# Patient Record
Sex: MALE | Race: WHITE | NOT HISPANIC OR LATINO | Employment: OTHER | URBAN - METROPOLITAN AREA
[De-identification: names, ages, dates, MRNs, and addresses within clinical notes are randomized per-mention and may not be internally consistent; named-entity substitution may affect disease eponyms.]

---

## 2023-06-26 ENCOUNTER — OFFICE VISIT (OUTPATIENT)
Dept: PRIMARY CARE | Facility: CLINIC | Age: 73
End: 2023-06-26
Payer: MEDICARE

## 2023-06-26 VITALS
DIASTOLIC BLOOD PRESSURE: 90 MMHG | HEIGHT: 75 IN | OXYGEN SATURATION: 94 % | BODY MASS INDEX: 39.17 KG/M2 | SYSTOLIC BLOOD PRESSURE: 177 MMHG | HEART RATE: 70 BPM | WEIGHT: 315 LBS

## 2023-06-26 DIAGNOSIS — N18.31 CHRONIC KIDNEY DISEASE, STAGE 3A (MULTI): ICD-10-CM

## 2023-06-26 DIAGNOSIS — Z00.00 ROUTINE GENERAL MEDICAL EXAMINATION AT HEALTH CARE FACILITY: Primary | ICD-10-CM

## 2023-06-26 DIAGNOSIS — I48.91 ATRIAL FIBRILLATION, UNSPECIFIED TYPE (MULTI): ICD-10-CM

## 2023-06-26 DIAGNOSIS — R35.1 NOCTURIA: ICD-10-CM

## 2023-06-26 DIAGNOSIS — I10 PRIMARY HYPERTENSION: ICD-10-CM

## 2023-06-26 DIAGNOSIS — E03.9 HYPOTHYROIDISM, UNSPECIFIED TYPE: ICD-10-CM

## 2023-06-26 DIAGNOSIS — E66.01 MORBID (SEVERE) OBESITY DUE TO EXCESS CALORIES (MULTI): ICD-10-CM

## 2023-06-26 DIAGNOSIS — E11.9 TYPE 2 DIABETES MELLITUS WITHOUT COMPLICATION, WITHOUT LONG-TERM CURRENT USE OF INSULIN (MULTI): ICD-10-CM

## 2023-06-26 PROCEDURE — 1159F MED LIST DOCD IN RCRD: CPT | Performed by: INTERNAL MEDICINE

## 2023-06-26 PROCEDURE — 4010F ACE/ARB THERAPY RXD/TAKEN: CPT | Performed by: INTERNAL MEDICINE

## 2023-06-26 PROCEDURE — 1157F ADVNC CARE PLAN IN RCRD: CPT | Performed by: INTERNAL MEDICINE

## 2023-06-26 PROCEDURE — 3080F DIAST BP >= 90 MM HG: CPT | Performed by: INTERNAL MEDICINE

## 2023-06-26 PROCEDURE — 1170F FXNL STATUS ASSESSED: CPT | Performed by: INTERNAL MEDICINE

## 2023-06-26 PROCEDURE — 1160F RVW MEDS BY RX/DR IN RCRD: CPT | Performed by: INTERNAL MEDICINE

## 2023-06-26 PROCEDURE — 1036F TOBACCO NON-USER: CPT | Performed by: INTERNAL MEDICINE

## 2023-06-26 PROCEDURE — 3077F SYST BP >= 140 MM HG: CPT | Performed by: INTERNAL MEDICINE

## 2023-06-26 PROCEDURE — G0439 PPPS, SUBSEQ VISIT: HCPCS | Performed by: INTERNAL MEDICINE

## 2023-06-26 PROCEDURE — 99214 OFFICE O/P EST MOD 30 MIN: CPT | Performed by: INTERNAL MEDICINE

## 2023-06-26 RX ORDER — AMOXICILLIN 500 MG/1
CAPSULE ORAL
COMMUNITY
Start: 2023-04-13 | End: 2023-11-30 | Stop reason: ALTCHOICE

## 2023-06-26 RX ORDER — CHOLECALCIFEROL (VITAMIN D3) 25 MCG
1000 TABLET ORAL
COMMUNITY
Start: 2015-09-14 | End: 2023-11-30 | Stop reason: ALTCHOICE

## 2023-06-26 RX ORDER — RIVAROXABAN 20 MG/1
20 TABLET, FILM COATED ORAL
COMMUNITY

## 2023-06-26 RX ORDER — HYDROCHLOROTHIAZIDE 12.5 MG/1
12.5 TABLET ORAL DAILY
Qty: 30 TABLET | Refills: 5 | Status: SHIPPED | OUTPATIENT
Start: 2023-06-26 | End: 2023-07-20

## 2023-06-26 RX ORDER — PNV NO.95/FERROUS FUM/FOLIC AC 28MG-0.8MG
1000 TABLET ORAL 3 TIMES WEEKLY
COMMUNITY
End: 2023-11-30 | Stop reason: ALTCHOICE

## 2023-06-26 RX ORDER — PHENAZOPYRIDINE HYDROCHLORIDE 200 MG/1
200 TABLET, FILM COATED ORAL
COMMUNITY
Start: 2022-12-09

## 2023-06-26 RX ORDER — MULTIVIT-MIN/IRON FUM/FOLIC AC 7.5 MG-4
TABLET ORAL
COMMUNITY
End: 2023-11-30 | Stop reason: ALTCHOICE

## 2023-06-26 RX ORDER — CLONIDINE HYDROCHLORIDE 0.1 MG/1
0.1 TABLET ORAL 3 TIMES DAILY
COMMUNITY
End: 2023-06-26 | Stop reason: ALTCHOICE

## 2023-06-26 RX ORDER — LISINOPRIL 40 MG/1
40 TABLET ORAL DAILY
COMMUNITY

## 2023-06-26 RX ORDER — CARVEDILOL 25 MG/1
25 TABLET ORAL
COMMUNITY

## 2023-06-26 ASSESSMENT — PATIENT HEALTH QUESTIONNAIRE - PHQ9
1. LITTLE INTEREST OR PLEASURE IN DOING THINGS: NOT AT ALL
2. FEELING DOWN, DEPRESSED OR HOPELESS: NOT AT ALL
SUM OF ALL RESPONSES TO PHQ9 QUESTIONS 1 AND 2: 0

## 2023-06-26 ASSESSMENT — ACTIVITIES OF DAILY LIVING (ADL)
BATHING: INDEPENDENT
GROCERY_SHOPPING: NEEDS ASSISTANCE
DRESSING: INDEPENDENT
MANAGING_FINANCES: INDEPENDENT
TAKING_MEDICATION: INDEPENDENT
DOING_HOUSEWORK: INDEPENDENT

## 2023-06-26 NOTE — PATIENT INSTRUCTIONS
Start hydrochlorothiazide in the am  Do not restart clonidine   Urology at [facility] [phone] or number on this form for [place]  Get fasting labs 7 days after starting hydrochlorothiazide     Come back in 3 months

## 2023-06-26 NOTE — PROGRESS NOTES
"Subjective   Reason for Visit: Aly Mccarty is an 73 y.o. male here for a Medicare Wellness visit.          Reviewed all medications by prescribing practitioner or clinical pharmacist (such as prescriptions, OTCs, herbal therapies and supplements) and documented in the medical record.    HPI    Patient Care Team:  Luis Eduardo Kay DO as PCP - General  Luis Eduardo Kay DO as PCP - [facility] PCP       S/p TAVR 11/2022. No CP, SOB or LE edema  Reports dysuria, urinary and frequency. Has a slow urinary stream, some hesitancy. Nocturia 5-6x nightly. Follows with urology, tamsulosin was not helpful    BP has been 150s/90s    Review of Systems   All other systems reviewed and are negative.      Objective   Vitals:  /90   Pulse 70   Ht 1.905 m (6' 3\")   Wt (!) 151 kg (332 lb)   SpO2 94%   BMI 41.50 kg/m²       Physical Exam  Constitutional:       Appearance: Normal appearance.   HENT:      Head: Normocephalic and atraumatic.   Cardiovascular:      Rate and Rhythm: Normal rate and regular rhythm.      Heart sounds: Normal heart sounds. No murmur heard.     No gallop.   Pulmonary:      Effort: Pulmonary effort is normal. No respiratory distress.      Breath sounds: No wheezing or rales.   Skin:     General: Skin is warm and dry.      Findings: No rash.   Neurological:      Mental Status: He is alert and oriented to person, place, and time. Mental status is at baseline.   Psychiatric:         Mood and Affect: Mood normal.         Behavior: Behavior normal.         Assessment/Plan      #Dizziness  -improved, seen by neuro and ENT--no etiology identified      #HTN, Chronic Afib, AVS s/p TAVR  -Follows with Dr. Alcantar   -HTN uncontrolled. Start hydrochlorothiazide 12.5mg daily  -Cont hydralazine 50mg TID, carvedilol 25mg BID  and  lisinopril 40mg daily   -Cont Xarelto      #Hypothyroidism  -Check TSH today, cont levothyroxine      #AMANDA  -Reports compliance with CPAP    #Chronic dysuria, nocturia   -Follow with Dr. Ramsay, " will get second opinion from  urologist        Colorectal cancer screening: 3-4 years ago  Prostate screenin18     RTC 6 mo

## 2023-07-12 ENCOUNTER — LAB (OUTPATIENT)
Dept: LAB | Facility: LAB | Age: 73
End: 2023-07-12
Payer: MEDICARE

## 2023-07-12 ENCOUNTER — APPOINTMENT (OUTPATIENT)
Dept: LAB | Facility: LAB | Age: 73
End: 2023-07-12
Payer: MEDICARE

## 2023-07-12 DIAGNOSIS — R35.1 NOCTURIA: ICD-10-CM

## 2023-07-12 DIAGNOSIS — E03.9 HYPOTHYROIDISM, UNSPECIFIED TYPE: ICD-10-CM

## 2023-07-12 DIAGNOSIS — E66.01 MORBID (SEVERE) OBESITY DUE TO EXCESS CALORIES (MULTI): ICD-10-CM

## 2023-07-12 DIAGNOSIS — I10 PRIMARY HYPERTENSION: ICD-10-CM

## 2023-07-12 LAB
ALANINE AMINOTRANSFERASE (SGPT) (U/L) IN SER/PLAS: 11 U/L (ref 10–52)
ALBUMIN (G/DL) IN SER/PLAS: 4.1 G/DL (ref 3.4–5)
ALKALINE PHOSPHATASE (U/L) IN SER/PLAS: 49 U/L (ref 33–136)
ANION GAP IN SER/PLAS: 12 MMOL/L (ref 10–20)
ASPARTATE AMINOTRANSFERASE (SGOT) (U/L) IN SER/PLAS: 17 U/L (ref 9–39)
BILIRUBIN TOTAL (MG/DL) IN SER/PLAS: 0.9 MG/DL (ref 0–1.2)
CALCIUM (MG/DL) IN SER/PLAS: 8.9 MG/DL (ref 8.6–10.3)
CARBON DIOXIDE, TOTAL (MMOL/L) IN SER/PLAS: 27 MMOL/L (ref 21–32)
CHLORIDE (MMOL/L) IN SER/PLAS: 97 MMOL/L (ref 98–107)
CHOLESTEROL (MG/DL) IN SER/PLAS: 181 MG/DL (ref 0–199)
CHOLESTEROL IN HDL (MG/DL) IN SER/PLAS: 47.4 MG/DL
CHOLESTEROL/HDL RATIO: 3.8
CREATININE (MG/DL) IN SER/PLAS: 1.83 MG/DL (ref 0.5–1.3)
ERYTHROCYTE DISTRIBUTION WIDTH (RATIO) BY AUTOMATED COUNT: 13.1 % (ref 11.5–14.5)
ERYTHROCYTE MEAN CORPUSCULAR HEMOGLOBIN CONCENTRATION (G/DL) BY AUTOMATED: 33.2 G/DL (ref 32–36)
ERYTHROCYTE MEAN CORPUSCULAR VOLUME (FL) BY AUTOMATED COUNT: 107 FL (ref 80–100)
ERYTHROCYTES (10*6/UL) IN BLOOD BY AUTOMATED COUNT: 3.72 X10E12/L (ref 4.5–5.9)
ESTIMATED AVERAGE GLUCOSE FOR HBA1C: 91 MG/DL
GFR MALE: 39 ML/MIN/1.73M2
GLUCOSE (MG/DL) IN SER/PLAS: 92 MG/DL (ref 74–99)
HEMATOCRIT (%) IN BLOOD BY AUTOMATED COUNT: 39.8 % (ref 41–52)
HEMOGLOBIN (G/DL) IN BLOOD: 13.2 G/DL (ref 13.5–17.5)
HEMOGLOBIN A1C/HEMOGLOBIN TOTAL IN BLOOD: 4.8 %
LDL: 110 MG/DL (ref 0–99)
LEUKOCYTES (10*3/UL) IN BLOOD BY AUTOMATED COUNT: 3.9 X10E9/L (ref 4.4–11.3)
PLATELETS (10*3/UL) IN BLOOD AUTOMATED COUNT: 91 X10E9/L (ref 150–450)
POTASSIUM (MMOL/L) IN SER/PLAS: 4.2 MMOL/L (ref 3.5–5.3)
PROSTATE SPECIFIC AG (NG/ML) IN SER/PLAS: 1.24 NG/ML (ref 0–4)
PROTEIN TOTAL: 6.7 G/DL (ref 6.4–8.2)
SODIUM (MMOL/L) IN SER/PLAS: 132 MMOL/L (ref 136–145)
THYROTROPIN (MIU/L) IN SER/PLAS BY DETECTION LIMIT <= 0.05 MIU/L: 1.01 MIU/L (ref 0.44–3.98)
TRIGLYCERIDE (MG/DL) IN SER/PLAS: 119 MG/DL (ref 0–149)
UREA NITROGEN (MG/DL) IN SER/PLAS: 28 MG/DL (ref 6–23)
VLDL: 24 MG/DL (ref 0–40)

## 2023-07-12 PROCEDURE — 83036 HEMOGLOBIN GLYCOSYLATED A1C: CPT

## 2023-07-12 PROCEDURE — 84443 ASSAY THYROID STIM HORMONE: CPT

## 2023-07-12 PROCEDURE — 36415 COLL VENOUS BLD VENIPUNCTURE: CPT

## 2023-07-12 PROCEDURE — 80053 COMPREHEN METABOLIC PANEL: CPT

## 2023-07-12 PROCEDURE — 80061 LIPID PANEL: CPT

## 2023-07-12 PROCEDURE — 85027 COMPLETE CBC AUTOMATED: CPT

## 2023-07-12 PROCEDURE — 84153 ASSAY OF PSA TOTAL: CPT

## 2023-07-17 ENCOUNTER — TELEPHONE (OUTPATIENT)
Dept: PRIMARY CARE | Facility: CLINIC | Age: 73
End: 2023-07-17
Payer: MEDICARE

## 2023-07-17 DIAGNOSIS — E87.1 HYPONATREMIA: Primary | ICD-10-CM

## 2023-07-17 DIAGNOSIS — D61.818 PANCYTOPENIA (MULTI): ICD-10-CM

## 2023-07-19 DIAGNOSIS — I10 PRIMARY HYPERTENSION: ICD-10-CM

## 2023-07-20 RX ORDER — HYDROCHLOROTHIAZIDE 12.5 MG/1
TABLET ORAL
Qty: 30 TABLET | Refills: 5 | Status: SHIPPED | OUTPATIENT
Start: 2023-07-20 | End: 2024-01-11

## 2023-09-27 PROBLEM — G47.36 SLEEP RELATED HYPOVENTILATION IN CONDITIONS CLASSIFIED ELSEWHERE: Status: ACTIVE | Noted: 2022-06-13

## 2023-09-27 PROBLEM — E53.8 VITAMIN B12 DEFICIENCY: Status: ACTIVE | Noted: 2023-09-27

## 2023-09-27 PROBLEM — H90.3 BILATERAL SENSORINEURAL HEARING LOSS: Status: ACTIVE | Noted: 2022-06-13

## 2023-09-27 PROBLEM — I85.00 ESOPHAGEAL VARICES (MULTI): Status: ACTIVE | Noted: 2017-07-03

## 2023-09-27 PROBLEM — E03.8 ADULT ONSET HYPOTHYROIDISM: Status: ACTIVE | Noted: 2023-09-27

## 2023-09-27 PROBLEM — R73.01 IMPAIRED FASTING GLUCOSE: Status: ACTIVE | Noted: 2023-09-27

## 2023-09-27 PROBLEM — E44.0 MALNUTRITION OF MODERATE DEGREE (MULTI): Status: ACTIVE | Noted: 2017-07-05

## 2023-09-27 PROBLEM — Z95.2 S/P TAVR (TRANSCATHETER AORTIC VALVE REPLACEMENT): Status: ACTIVE | Noted: 2023-09-27

## 2023-09-27 PROBLEM — L03.90 CELLULITIS: Status: ACTIVE | Noted: 2017-07-03

## 2023-09-27 PROBLEM — D61.818 PANCYTOPENIA (MULTI): Status: ACTIVE | Noted: 2023-09-27

## 2023-09-27 PROBLEM — G60.9 IDIOPATHIC PERIPHERAL NEUROPATHY: Status: ACTIVE | Noted: 2023-09-27

## 2023-09-27 PROBLEM — J45.901 ASTHMA WITH COPD WITH EXACERBATION (MULTI): Status: ACTIVE | Noted: 2017-07-03

## 2023-09-27 PROBLEM — N28.9 KIDNEY DISEASE: Status: ACTIVE | Noted: 2023-09-27

## 2023-09-27 PROBLEM — K76.89 HEPATIC CYST: Status: ACTIVE | Noted: 2023-09-27

## 2023-09-27 PROBLEM — I35.0 AORTIC VALVE STENOSIS: Status: ACTIVE | Noted: 2023-09-27

## 2023-09-27 PROBLEM — I51.3 ATRIAL THROMBUS: Status: ACTIVE | Noted: 2023-09-27

## 2023-09-27 PROBLEM — I48.20 ATRIAL FIBRILLATION, CHRONIC (MULTI): Status: ACTIVE | Noted: 2017-07-03

## 2023-09-27 PROBLEM — Z87.891 PERSONAL HISTORY OF NICOTINE DEPENDENCE: Status: ACTIVE | Noted: 2022-06-13

## 2023-09-27 PROBLEM — Z99.89 BILEVEL POSITIVE AIRWAY PRESSURE (BPAP) DEPENDENCE: Status: ACTIVE | Noted: 2023-09-27

## 2023-09-27 PROBLEM — M48.00 SPINAL STENOSIS: Status: ACTIVE | Noted: 2017-07-03

## 2023-09-27 PROBLEM — K63.8219 SMALL INTESTINAL BACTERIAL OVERGROWTH: Status: ACTIVE | Noted: 2023-09-27

## 2023-09-27 PROBLEM — E55.9 VITAMIN D DEFICIENCY: Status: ACTIVE | Noted: 2023-09-27

## 2023-09-27 PROBLEM — N52.9 ED (ERECTILE DYSFUNCTION): Status: ACTIVE | Noted: 2023-09-27

## 2023-09-27 PROBLEM — M54.50 LOW BACK PAIN: Status: ACTIVE | Noted: 2023-09-27

## 2023-09-27 PROBLEM — R53.1 WEAKNESS: Status: ACTIVE | Noted: 2017-07-03

## 2023-09-27 PROBLEM — G60.9 HEREDITARY AND IDIOPATHIC NEUROPATHY: Status: ACTIVE | Noted: 2022-06-13

## 2023-09-27 PROBLEM — I10 BENIGN ESSENTIAL HYPERTENSION: Status: ACTIVE | Noted: 2023-09-27

## 2023-09-27 PROBLEM — G47.33 OBSTRUCTIVE SLEEP APNEA: Status: ACTIVE | Noted: 2017-07-03

## 2023-09-27 PROBLEM — R26.89 DECREASED MOBILITY: Status: ACTIVE | Noted: 2023-09-27

## 2023-09-27 PROBLEM — K27.4 PEPTIC ULCER HEMORRHAGE: Status: ACTIVE | Noted: 2023-09-27

## 2023-09-27 PROBLEM — J44.1 ASTHMA WITH COPD WITH EXACERBATION (MULTI): Status: ACTIVE | Noted: 2017-07-03

## 2023-09-27 PROBLEM — R26.9 ABNORMAL GAIT: Status: ACTIVE | Noted: 2023-09-27

## 2023-09-27 PROBLEM — T16.9XXA FOREIGN BODY IN EAR: Status: ACTIVE | Noted: 2023-09-27

## 2023-09-27 PROBLEM — I35.0 NONRHEUMATIC AORTIC (VALVE) STENOSIS: Status: ACTIVE | Noted: 2022-06-13

## 2023-09-27 PROBLEM — R10.9 ABDOMINAL PAIN: Status: ACTIVE | Noted: 2017-08-28

## 2023-09-27 PROBLEM — N28.9 RENAL DYSFUNCTION: Status: ACTIVE | Noted: 2023-09-27

## 2023-09-27 PROBLEM — Z91.199 NONCOMPLIANCE WITH CPAP TREATMENT: Status: ACTIVE | Noted: 2023-09-27

## 2023-09-27 PROBLEM — H93.13 TINNITUS OF BOTH EARS: Status: ACTIVE | Noted: 2023-09-27

## 2023-09-27 PROBLEM — R42 DIZZINESS: Status: ACTIVE | Noted: 2023-09-27

## 2023-09-27 PROBLEM — M48.061 SPINAL STENOSIS, LUMBAR REGION WITHOUT NEUROGENIC CLAUDICATION: Status: ACTIVE | Noted: 2022-06-13

## 2023-09-27 PROBLEM — N17.9 ACUTE KIDNEY FAILURE (CMS-HCC): Status: ACTIVE | Noted: 2017-07-03

## 2023-09-27 PROBLEM — K21.00 GASTROESOPHAGEAL REFLUX DISEASE WITH ESOPHAGITIS: Status: ACTIVE | Noted: 2023-09-27

## 2023-09-27 PROBLEM — D64.9 ANEMIA: Status: ACTIVE | Noted: 2017-07-03

## 2023-09-27 PROBLEM — E78.2 COMBINED HYPERLIPIDEMIA: Status: ACTIVE | Noted: 2023-09-27

## 2023-09-27 RX ORDER — LEVOTHYROXINE SODIUM 75 UG/1
TABLET ORAL
COMMUNITY
End: 2023-11-30 | Stop reason: ALTCHOICE

## 2023-09-27 RX ORDER — FUROSEMIDE 40 MG/1
TABLET ORAL
COMMUNITY
End: 2023-11-30 | Stop reason: ALTCHOICE

## 2023-09-27 RX ORDER — NYSTATIN 100000 [USP'U]/G
POWDER TOPICAL
COMMUNITY
End: 2023-11-30 | Stop reason: ALTCHOICE

## 2023-09-27 RX ORDER — ASPIRIN 81 MG/1
1 TABLET ORAL DAILY
COMMUNITY
End: 2023-11-30 | Stop reason: ALTCHOICE

## 2023-09-27 RX ORDER — FERROUS SULFATE, DRIED 160(50) MG
TABLET, EXTENDED RELEASE ORAL
COMMUNITY
End: 2023-11-30 | Stop reason: ALTCHOICE

## 2023-09-27 RX ORDER — METOPROLOL SUCCINATE 25 MG/1
25 TABLET, EXTENDED RELEASE ORAL 2 TIMES DAILY
COMMUNITY
End: 2023-11-30 | Stop reason: ALTCHOICE

## 2023-09-27 RX ORDER — CLONIDINE HYDROCHLORIDE 0.1 MG/1
0.1 TABLET ORAL 2 TIMES DAILY
COMMUNITY
Start: 2022-06-23 | End: 2023-11-30 | Stop reason: ALTCHOICE

## 2023-09-27 RX ORDER — CHOLECALCIFEROL (VITAMIN D3) 125 MCG
CAPSULE ORAL
COMMUNITY
End: 2023-11-30 | Stop reason: ALTCHOICE

## 2023-09-27 RX ORDER — LIDOCAINE 560 MG/1
PATCH PERCUTANEOUS; TOPICAL; TRANSDERMAL
COMMUNITY
End: 2023-11-30 | Stop reason: ALTCHOICE

## 2023-09-27 RX ORDER — AMIODARONE HYDROCHLORIDE 200 MG/1
TABLET ORAL
COMMUNITY
End: 2023-11-30 | Stop reason: ALTCHOICE

## 2023-09-27 RX ORDER — GABAPENTIN 300 MG/1
300 CAPSULE ORAL EVERY 8 HOURS
COMMUNITY
Start: 2017-07-14 | End: 2023-11-30 | Stop reason: ALTCHOICE

## 2023-09-27 RX ORDER — LANOLIN ALCOHOL/MO/W.PET/CERES
2 CREAM (GRAM) TOPICAL DAILY
COMMUNITY
End: 2023-12-03 | Stop reason: SDUPTHER

## 2023-09-27 RX ORDER — ACYCLOVIR 800 MG/1
TABLET ORAL
COMMUNITY
End: 2023-11-30 | Stop reason: ALTCHOICE

## 2023-09-27 RX ORDER — LANOLIN ALCOHOL/MO/W.PET/CERES
1 CREAM (GRAM) TOPICAL DAILY
COMMUNITY
End: 2023-11-30 | Stop reason: ALTCHOICE

## 2023-09-27 RX ORDER — FUROSEMIDE 20 MG/1
20 TABLET ORAL DAILY
COMMUNITY
End: 2023-11-30 | Stop reason: ALTCHOICE

## 2023-09-27 RX ORDER — DABIGATRAN ETEXILATE 150 MG/1
CAPSULE ORAL
COMMUNITY
End: 2023-11-30 | Stop reason: ALTCHOICE

## 2023-09-27 RX ORDER — POTASSIUM CHLORIDE 20 MEQ/1
TABLET, EXTENDED RELEASE ORAL
COMMUNITY
Start: 2017-07-14 | End: 2023-11-30 | Stop reason: ALTCHOICE

## 2023-09-27 RX ORDER — ASPIRIN 325 MG
TABLET ORAL
COMMUNITY
End: 2023-11-30 | Stop reason: ALTCHOICE

## 2023-09-27 RX ORDER — LISINOPRIL 20 MG/1
TABLET ORAL
COMMUNITY
End: 2023-11-30 | Stop reason: ALTCHOICE

## 2023-09-27 RX ORDER — CALCIUM CARBONATE 500(1250)
1 TABLET ORAL 2 TIMES DAILY
COMMUNITY
End: 2023-11-30 | Stop reason: ALTCHOICE

## 2023-09-27 RX ORDER — DOCUSATE SODIUM 100 MG/1
CAPSULE, LIQUID FILLED ORAL
COMMUNITY
End: 2023-11-30 | Stop reason: ALTCHOICE

## 2023-09-27 RX ORDER — POLYVINYL ALCOHOL 14 MG/ML
1 SOLUTION/ DROPS OPHTHALMIC 3 TIMES DAILY
COMMUNITY
Start: 2017-07-14 | End: 2023-11-30 | Stop reason: ALTCHOICE

## 2023-09-27 RX ORDER — HYDRALAZINE HYDROCHLORIDE 25 MG/1
TABLET, FILM COATED ORAL
COMMUNITY
End: 2023-11-30 | Stop reason: ALTCHOICE

## 2023-09-27 RX ORDER — COLLAGENASE SANTYL 250 [ARB'U]/G
OINTMENT TOPICAL
COMMUNITY
End: 2023-11-30 | Stop reason: ALTCHOICE

## 2023-09-27 RX ORDER — OXYCODONE AND ACETAMINOPHEN 5; 325 MG/1; MG/1
TABLET ORAL
COMMUNITY
End: 2023-11-30 | Stop reason: ALTCHOICE

## 2023-09-27 RX ORDER — LEVOTHYROXINE SODIUM 100 UG/1
TABLET ORAL
COMMUNITY
End: 2023-11-30 | Stop reason: ALTCHOICE

## 2023-09-27 RX ORDER — NAPROXEN SODIUM 220 MG/1
TABLET, FILM COATED ORAL
COMMUNITY
End: 2023-11-30 | Stop reason: ALTCHOICE

## 2023-09-27 RX ORDER — SUCRALFATE 1 G/10ML
SUSPENSION ORAL
COMMUNITY
End: 2023-11-30 | Stop reason: ALTCHOICE

## 2023-09-27 RX ORDER — ALBUTEROL SULFATE 0.83 MG/ML
2.5 SOLUTION RESPIRATORY (INHALATION) EVERY 2 HOUR PRN
COMMUNITY
End: 2024-02-29

## 2023-09-27 RX ORDER — TIZANIDINE 2 MG/1
TABLET ORAL 3 TIMES DAILY
COMMUNITY
Start: 2017-07-14 | End: 2023-11-30 | Stop reason: ALTCHOICE

## 2023-09-27 RX ORDER — FENTANYL 25 UG/1
1 PATCH TRANSDERMAL
COMMUNITY
End: 2023-11-30 | Stop reason: ALTCHOICE

## 2023-09-27 RX ORDER — ATORVASTATIN CALCIUM 20 MG/1
TABLET, FILM COATED ORAL
COMMUNITY
End: 2023-11-30 | Stop reason: ALTCHOICE

## 2023-09-27 RX ORDER — TRAMADOL HYDROCHLORIDE 50 MG/1
TABLET ORAL
COMMUNITY
End: 2023-11-30 | Stop reason: ALTCHOICE

## 2023-09-27 RX ORDER — BUTYROSPERMUM PARKII(SHEA BUTTER), SIMMONDSIA CHINENSIS (JOJOBA) SEED OIL, ALOE BARBADENSIS LEAF EXTRACT .01; 1; 3.5 G/100G; G/100G; G/100G
LIQUID TOPICAL
COMMUNITY
End: 2023-11-30 | Stop reason: ALTCHOICE

## 2023-09-27 RX ORDER — DOXYCYCLINE HYCLATE 100 MG
TABLET ORAL
COMMUNITY
End: 2023-11-30 | Stop reason: ALTCHOICE

## 2023-11-09 ENCOUNTER — APPOINTMENT (OUTPATIENT)
Dept: HEMATOLOGY/ONCOLOGY | Facility: CLINIC | Age: 73
End: 2023-11-09
Payer: MEDICARE

## 2023-11-24 ENCOUNTER — TELEMEDICINE (OUTPATIENT)
Dept: CARDIOLOGY | Facility: HOSPITAL | Age: 73
End: 2023-11-24
Payer: MEDICARE

## 2023-11-24 DIAGNOSIS — Z95.2 S/P TAVR (TRANSCATHETER AORTIC VALVE REPLACEMENT): Primary | ICD-10-CM

## 2023-11-24 PROCEDURE — 99212 OFFICE O/P EST SF 10 MIN: CPT

## 2023-11-24 ASSESSMENT — ENCOUNTER SYMPTOMS: BACK PAIN: 1

## 2023-11-24 NOTE — PROGRESS NOTES
Subjective   Aly Mccarty is a 73 y.o. male.    HPI  One year s/p TAVR with [facility].    Review of Systems   Musculoskeletal:  Positive for back pain.   All other systems reviewed and are negative.         KCCQ Questionnaire      1  Heart failure affects different people in different ways. Some feel shortness of breath while others feel fatigue. Please indicate how much you are limited by heart failure (shortness of breath or fatigue) in your ability to do the following activities over the past 2 weeks. 1 month [facility] NP follow-up     A.) Showering/bathing  5. Not at All  B.) Walking 1 block on level ground 6. Limited for other reastons  C.) Hurrying or Jogging   6. Limited for other reastons    2.  Over the past 2 weeks, how many times did you have swelling in your feet, ankles or legs when you woke up in the morning? 5. Never    3.  Over the past 2 weeks, on average, how many times has fatigue limited your ability to do what you wanted? 7. Never    4.  Over the past 2 weeks, on average, how many times has shortness of breath limited your ability to do what you wanted? 7. Never    5.  Over the past 2 weeks, on average, how many times have you been forced to sleep sitting up in a chair or with at least 3 pillows to prop you up because of shortness of breath? Never    6. Over the past 2 weeks, how much has your heart failure limited your enjoyment of life? It has not limited my enjoyment of life    7. If you had to spend the rest of your life with your heart failure the way it is right now, how would you feel about this? 5. Completely satisfied    8. How much does your heart failure affect your lifestyle? Please indicate how your heart failure may have limited yourparticipation in the following activities over the past 2 weeks    A.)  Hobbies, recreational activities  6. Does not apply for other reasons    B.) Working or doing household chores  6. Does not apply for other reasons    C.)  Visiting family or friends out of your home  6. Does not apply for other reasons           Assessment/Plan     NYHA class I    Assessment/Impression: patient doing well clinically and denies HF signs and symptoms. VS: reported stable.     Plan:  - Cont current medication regimen  - Cont to increase activity as tolerated  - no further follow-ups with [facility], cont to follow with [facility] Dr. Alcantar  - echo done in August, will obtain from Dr. Alcantar's office  - Life-long Dental SBE prophylaxis for life  - KCCQ complete     Telehealth visit via [employer].      Lety Owens, APRN-CNP, CCRN  [facility] Team  Office Phone: [phone]  Fax: [phone]

## 2023-11-30 ENCOUNTER — OFFICE VISIT (OUTPATIENT)
Dept: HEMATOLOGY/ONCOLOGY | Facility: CLINIC | Age: 73
End: 2023-11-30
Payer: MEDICARE

## 2023-11-30 ENCOUNTER — OFFICE VISIT (OUTPATIENT)
Dept: GASTROENTEROLOGY | Facility: CLINIC | Age: 73
End: 2023-11-30
Payer: MEDICARE

## 2023-11-30 VITALS
HEART RATE: 52 BPM | RESPIRATION RATE: 18 BRPM | BODY MASS INDEX: 42.84 KG/M2 | WEIGHT: 315 LBS | SYSTOLIC BLOOD PRESSURE: 167 MMHG | TEMPERATURE: 97.7 F | OXYGEN SATURATION: 94 % | DIASTOLIC BLOOD PRESSURE: 77 MMHG

## 2023-11-30 VITALS
DIASTOLIC BLOOD PRESSURE: 82 MMHG | WEIGHT: 315 LBS | HEIGHT: 75 IN | HEART RATE: 52 BPM | SYSTOLIC BLOOD PRESSURE: 138 MMHG | OXYGEN SATURATION: 95 % | BODY MASS INDEX: 39.17 KG/M2

## 2023-11-30 DIAGNOSIS — N28.9 KIDNEY DISEASE: ICD-10-CM

## 2023-11-30 DIAGNOSIS — D61.818 PANCYTOPENIA (MULTI): Primary | ICD-10-CM

## 2023-11-30 DIAGNOSIS — K76.89 HEPATIC CYST: Primary | ICD-10-CM

## 2023-11-30 DIAGNOSIS — D61.818 PANCYTOPENIA (MULTI): ICD-10-CM

## 2023-11-30 LAB
ALBUMIN SERPL BCP-MCNC: 4 G/DL (ref 3.4–5)
ALP SERPL-CCNC: 63 U/L (ref 33–136)
ALT SERPL W P-5'-P-CCNC: 9 U/L (ref 10–52)
ANION GAP SERPL CALC-SCNC: 16 MMOL/L (ref 10–20)
AST SERPL W P-5'-P-CCNC: 16 U/L (ref 9–39)
B2 MICROGLOB SERPL-MCNC: 5.3 MG/L (ref 0.7–2.2)
BASOPHILS # BLD AUTO: 0.03 X10*3/UL (ref 0–0.1)
BASOPHILS NFR BLD AUTO: 0.6 %
BILIRUB SERPL-MCNC: 0.6 MG/DL (ref 0–1.2)
BUN SERPL-MCNC: 36 MG/DL (ref 6–23)
CALCIUM SERPL-MCNC: 8.9 MG/DL (ref 8.6–10.3)
CHLORIDE SERPL-SCNC: 101 MMOL/L (ref 98–107)
CO2 SERPL-SCNC: 22 MMOL/L (ref 21–32)
CREAT SERPL-MCNC: 2.24 MG/DL (ref 0.5–1.3)
EOSINOPHIL # BLD AUTO: 0.23 X10*3/UL (ref 0–0.4)
EOSINOPHIL NFR BLD AUTO: 4.8 %
ERYTHROCYTE [DISTWIDTH] IN BLOOD BY AUTOMATED COUNT: 12.8 % (ref 11.5–14.5)
FERRITIN SERPL-MCNC: 104 NG/ML (ref 20–300)
FOLATE SERPL-MCNC: >24 NG/ML
GFR SERPL CREATININE-BSD FRML MDRD: 30 ML/MIN/1.73M*2
GLUCOSE SERPL-MCNC: 108 MG/DL (ref 74–99)
HCT VFR BLD AUTO: 38.9 % (ref 41–52)
HGB BLD-MCNC: 11.9 G/DL (ref 13.5–17.5)
HGB RETIC QN: 38 PG (ref 28–38)
IMM GRANULOCYTES # BLD AUTO: 0.01 X10*3/UL (ref 0–0.5)
IMM GRANULOCYTES NFR BLD AUTO: 0.2 % (ref 0–0.9)
IMMATURE RETIC FRACTION: 20.9 %
IRON SATN MFR SERPL: 39 % (ref 25–45)
IRON SERPL-MCNC: 137 UG/DL (ref 35–150)
LDH SERPL L TO P-CCNC: 214 U/L (ref 84–246)
LYMPHOCYTES # BLD AUTO: 0.86 X10*3/UL (ref 0.8–3)
LYMPHOCYTES NFR BLD AUTO: 18 %
MCH RBC QN AUTO: 36.4 PG (ref 26–34)
MCHC RBC AUTO-ENTMCNC: 30.6 G/DL (ref 32–36)
MCV RBC AUTO: 119 FL (ref 80–100)
MONOCYTES # BLD AUTO: 0.32 X10*3/UL (ref 0.05–0.8)
MONOCYTES NFR BLD AUTO: 6.7 %
NEUTROPHILS # BLD AUTO: 3.32 X10*3/UL (ref 1.6–5.5)
NEUTROPHILS NFR BLD AUTO: 69.7 %
PATH REVIEW-CBC DIFFERENTIAL: NORMAL
PLATELET # BLD AUTO: 79 X10*3/UL (ref 150–450)
POTASSIUM SERPL-SCNC: 5.1 MMOL/L (ref 3.5–5.3)
PROT SERPL-MCNC: 6.9 G/DL (ref 6.4–8.2)
PROT SERPL-MCNC: 7.2 G/DL (ref 6.4–8.2)
RBC # BLD AUTO: 3.27 X10*6/UL (ref 4.5–5.9)
RETICS #: 0.08 X10*6/UL (ref 0.02–0.11)
RETICS/RBC NFR AUTO: 2.4 % (ref 0.5–2)
SODIUM SERPL-SCNC: 134 MMOL/L (ref 136–145)
TIBC SERPL-MCNC: 347 UG/DL (ref 240–445)
UIBC SERPL-MCNC: 210 UG/DL (ref 110–370)
VIT B12 SERPL-MCNC: 1671 PG/ML (ref 211–911)
WBC # BLD AUTO: 4.8 X10*3/UL (ref 4.4–11.3)

## 2023-11-30 PROCEDURE — 1159F MED LIST DOCD IN RCRD: CPT | Performed by: INTERNAL MEDICINE

## 2023-11-30 PROCEDURE — 99204 OFFICE O/P NEW MOD 45 MIN: CPT | Performed by: INTERNAL MEDICINE

## 2023-11-30 PROCEDURE — 1036F TOBACCO NON-USER: CPT

## 2023-11-30 PROCEDURE — 3075F SYST BP GE 130 - 139MM HG: CPT | Performed by: INTERNAL MEDICINE

## 2023-11-30 PROCEDURE — 83020 HEMOGLOBIN ELECTROPHORESIS: CPT | Performed by: PATHOLOGY

## 2023-11-30 PROCEDURE — 1159F MED LIST DOCD IN RCRD: CPT

## 2023-11-30 PROCEDURE — 99215 OFFICE O/P EST HI 40 MIN: CPT | Mod: PO,25

## 2023-11-30 PROCEDURE — 1160F RVW MEDS BY RX/DR IN RCRD: CPT

## 2023-11-30 PROCEDURE — 4010F ACE/ARB THERAPY RXD/TAKEN: CPT

## 2023-11-30 PROCEDURE — 4010F ACE/ARB THERAPY RXD/TAKEN: CPT | Performed by: INTERNAL MEDICINE

## 2023-11-30 PROCEDURE — 36415 COLL VENOUS BLD VENIPUNCTURE: CPT

## 2023-11-30 PROCEDURE — 83021 HEMOGLOBIN CHROMOTOGRAPHY: CPT

## 2023-11-30 PROCEDURE — 99215 OFFICE O/P EST HI 40 MIN: CPT

## 2023-11-30 PROCEDURE — 1126F AMNT PAIN NOTED NONE PRSNT: CPT | Performed by: INTERNAL MEDICINE

## 2023-11-30 PROCEDURE — 1126F AMNT PAIN NOTED NONE PRSNT: CPT

## 2023-11-30 PROCEDURE — 3078F DIAST BP <80 MM HG: CPT

## 2023-11-30 PROCEDURE — 1160F RVW MEDS BY RX/DR IN RCRD: CPT | Performed by: INTERNAL MEDICINE

## 2023-11-30 PROCEDURE — 3077F SYST BP >= 140 MM HG: CPT

## 2023-11-30 PROCEDURE — 3044F HG A1C LEVEL LT 7.0%: CPT | Performed by: INTERNAL MEDICINE

## 2023-11-30 PROCEDURE — 3044F HG A1C LEVEL LT 7.0%: CPT

## 2023-11-30 PROCEDURE — 3079F DIAST BP 80-89 MM HG: CPT | Performed by: INTERNAL MEDICINE

## 2023-11-30 PROCEDURE — 1036F TOBACCO NON-USER: CPT | Performed by: INTERNAL MEDICINE

## 2023-11-30 ASSESSMENT — PAIN SCALES - GENERAL
PAINLEVEL: 0-NO PAIN
PAINLEVEL: 0-NO PAIN

## 2023-11-30 NOTE — PROGRESS NOTES
"Patient Visit Information:   Visit Type: Follow Up Visit      History of Present Illness:      ID Statement:    PAMELA CANCINO is a 73 year old Male        Chief Complaint: Follow up for pancytopenia   Interval History:    Patient is a 73-year-old male that presents with his wife, Sole, for follow up of pancytopenia.  Patient presents in a wheelchair.  Outside lab records indicate pancytopenia ongoing since 2014.  Wife states he has been hospitalized in 2017 for over 5 months for osteomyelitis and septicemia.  He has chronic urinary tract infections. Last imaging CT chest abdomen and pelvis in 2022 reveal CAD, atherosclerosis,  calcified aortic valve, pulmonary hypertension, dilated pulmonary artery and hernia.  Head CT in 2021 revealed maxillary sinus thickening but no acute issues.  Vitals today are within normal range, systolic reading 166, previously elevated at prior visit.  Patient states he has had ongoing issues of brain fog for many years.  It was also noted in 2017 that he had internal bleeding and a colonoscopy and EGD was performed in which the patient states has resolved. Platelet and blood transfusions administered.  He is taking Pyridium daily for 3 months for urinary discomfort, he states PCP approval.   He denies any headache, lymphadenopathy, fever, night sweats, shortness of breath, chest pain or palpitations, abdominal pain, constipation, diarrhea, nausea, vomiting. Urinary issues are chronic and include dysuria, frequency and urgency.  Nocturia  6 times a night. \"I am use to it.\" He has sleep apnea and is on a CPAP.  Patient denies any issues with appetite, or weight loss.  Continues daily alcohol intake of 4 double bourbon. He sees a new urologist in October 2023 Dr. Carmichael.  10/02/2023, he left the office before being seen due to long office wait time.    Imaging Studies:  US abdomen on 08/31/2023 shows calculi vs sludge in gallbladder, hepatomegaly with similar appearance of hepatic " cyst in left lobe of liver. Previous CT abdomen 2014 shows fatty liver, cyst in left lobe, portal HTN, likely cirrhosis of the liver. Borderline splenomegaly. Repeat CT abdomen 2018 shows hepatic cysts in left lobe, suggest benign etiology. Renal US 2017 and 2020, both unremarkable. On 2019 Ct Pelvis shows umbilical hernia and no prostate abscess.     Medical history:  -Hypertension  -Aortic valve stenosis  -Chronic UTI  -A-fib on Xarelto  -Hearing loss sensorineural  -Dizziness and vertigo  -Erectile dysfunction  -GERD  -Idiopathic neuropathy  -Back pain  -Obesity  -Peptic ulcer hemorrhage  -Vitamin D3 def  -Vitamin B12  -Tinnitus      Social History:  - lives with wife Sole and two children (boy and girl)   -retired  -Daily alcohol intake   -Denies smoking or suing tobacco   -Occasional Marijuana use  -No illicit drug uses      Family History:  -mom  52 years breast cancer  -dad  84 years vascular disease   -Niece leukemia  as a teenager  -Sister with hypertension  -Daughter immune disease  -Brother myocardial infarction      Review of Systems:   System Review-All other systems including Neurologic, Cardiac, Gastrointestinal, Endocrine, Dermatologic, ENT, Respiratory, Infectious, Urologic, Musculoskeletal  have been reviewed and are negative for complaint outside of events noted below and within the assessment.            Allergies and Intolerances:       Allergies   Allergen Reactions    Levofloxacin Angioedema and Hives      Outpatient Medication Profile:  Current Outpatient Medications   Medication Instructions    albuterol 2.5 mg, nebulization, Every 2 hour PRN    B-12 Plus 5,000-100 mcg tablet, sublingual TAKE 1 TABLET SUBLINGUALLY EVERY DAY    carvedilol (COREG) 25 mg, oral, 2 times daily with meals, Take with food.    cyanocobalamin (Vitamin B-12) 1,000 mcg tablet 2 tablets, oral, Daily    folic acid (FOLVITE) 1 mg, oral, Daily    hydrALAZINE  (Apresoline) 50 mg tablet TAKE 1 TABLET BY MOUTH THREE TIMES A DAY    hydroCHLOROthiazide (HYDRODiuril) 12.5 mg tablet TAKE 1 TABLET BY MOUTH ONCE DAILY.    levothyroxine (Synthroid, Levoxyl) 88 mcg tablet TAKE 1 TABLET BY MOUTH EVERY DAY ON EMPTY STOMACH    lisinopril 40 mg, oral, Daily    phenazopyridine (PYRIDIUM) 200 mg, oral, 3 times daily with meals    thiamine (VITAMIN B-1) 100 mg, oral, Daily    Xarelto 20 mg, oral, Daily with evening meal      Past Medical History:   Diagnosis Date    Personal history of diseases of the blood and blood-forming organs and certain disorders involving the immune mechanism     History of hemorrhagic diathesis    Personal history of other diseases of the circulatory system     History of hypertension    Personal history of other diseases of the circulatory system 12/22/2022    History of aortic valve stenosis    Personal history of other diseases of the circulatory system 10/10/2021    History of chronic atrial fibrillation    Personal history of other diseases of the musculoskeletal system and connective tissue     History of spinal stenosis    Personal history of other diseases of the musculoskeletal system and connective tissue     History of arthritis    Personal history of other diseases of the nervous system and sense organs     History of sleep apnea      Performance:   ECOG Performance Status: 2- Ambulatory 50% of waking  hours          Vitals and Measurements:   Visit Vitals  /77 (BP Location: Right arm, Patient Position: Sitting, BP Cuff Size: Large adult long)   Pulse 52   Temp 36.5 °C (97.7 °F) (Temporal)   Resp 18      Vitals:    11/30/23 1034   Weight: (!) 155 kg (342 lb 11.3 oz)   Previous vitals and weight  Vitals: Temp: 36.2  HR: 49  RR: 18  BP: 155/78  SPO2%:   94   Measurements: HT(cm): 190.5  WT(kg): 149.4  BSA:  2.81  BMI:  41.1   Last 3 Weights & Heights: Date:                           Weight/Scale Type:                    Height:   24-Aug-2023 13:07                 149.4  kg                     190.5  cm         Physical Exam:      Constitutional: Well developed, awake/alert/oriented  x3, no distress, alert and cooperative   Eyes: PERRL, EOMI, clear sclera   ENMT: mucous membranes moist, no apparent injury,  no lesions seen   Head/Neck: Neck supple, no apparent injury, thyroid  without mass or tenderness, No JVD, trachea midline, no bruits   Respiratory/Thorax: Patent airways, CTAB, diminished bases bilateral, normal  breath sounds with good chest expansion, thorax symmetric   Cardiovascular: arrhythmia, no murmurs, swelling in bilateral legs plus one   Gastrointestinal: Obese, soft, non-tender, no rebound tenderness or guarding, unable to palpate liver or spleen   Musculoskeletal: ROM intact, no joint swelling, normal  strength   Extremities: Swelling right ankle, brace, no cyanosis, no contusions or wounds, no clubbing, mild swelling ankles.   Neurological: alert and oriented x3, intact senses,  motor, response and reflexes, normal strength for baseline   Lymphatic: No significant cervical or subclavicular  lymphadenopathy   Psychological: Appropriate mood and behavior. Assessment of alcohol cessation, patient does not want to quit drinking at this time   Skin: Warm and dry, pink cheeks, no lesions, no rashes         Lab Results:  Labs: Review by me  Lab Results   Component Value Date    WBC 4.8 11/30/2023    NEUTROABS 3.32 11/30/2023    IGABSOL 0.01 11/30/2023    LYMPHSABS 0.86 11/30/2023    MONOSABS 0.32 11/30/2023    EOSABS 0.23 11/30/2023    BASOSABS 0.03 11/30/2023    RBC 3.27 (L) 11/30/2023     (H) 11/30/2023    MCHC 30.6 (L) 11/30/2023    HGB 11.9 (L) 11/30/2023    HCT 38.9 (L) 11/30/2023    PLT 79 (L) 11/30/2023     Lab Results   Component Value Date    RETICCTPCT 2.4 (H) 11/30/2023      Lab Results   Component Value Date    CREATININE 2.24 (H) 11/30/2023    BUN 36 (H) 11/30/2023    EGFR 30 (L) 11/30/2023     (L) 11/30/2023    K 5.1  11/30/2023     11/30/2023    CO2 22 11/30/2023      Lab Results   Component Value Date    ALT 9 (L) 11/30/2023    AST 16 11/30/2023    ALKPHOS 63 11/30/2023    BILITOT 0.6 11/30/2023     Lab Results   Component Value Date    IRON 137 11/30/2023    TIBC 347 11/30/2023    FERRITIN 104 11/30/2023      Lab Results   Component Value Date    SNMUJWKA59 1,671 (H) 11/30/2023      Lab Results   Component Value Date    FOLATE >24.0 11/30/2023     Lab Results   Component Value Date    SEDRATE 6 08/24/2023      Lab Results   Component Value Date    CRP CANCELED 08/24/2023      Lab Results   Component Value Date     11/30/2023     Lab Results   Component Value Date    HAPTOGLOBIN 124 11/30/2023     Previous Labs 08/2023:  CBC date/time       WBC     HGB     HCT     PLT     Neut      24-Aug-2023 13:15   3.9(L)  12.3(L) 37.7(L) 90(L)   2.64     BMP date/time       NA              K               CL              CO2             BUN             CREAT             24-Aug-2023 13:15   128(L)          4.2             96(L)           N/A             36(H)            1.89(H)     Hepatic date/time   T Pro   T Bili  AST     ALT     ALKP    ALB       06-Jun-2017 13:50   5.8(L)  0.4     123(H)   29      154(H)  2.0(L)     LDH date/time       LDH     24-Aug-2023 13:15   N/A    Assessment and Plan:   Pleasant 73-year-old male presents for pancytopenia follow-up.  Discussed likely contributors below.  Also discussed daily alcohol use and chronic urinary tract infections as playing a role in his pancytopenia.  Pathologist review of CBCd reveal macrocytic anemia. Supplements of vital vitamin nutrients advised and prescribed. Will assess copper if leukopenia persists.  Discussed the nutritional deficiency, liver disease and alcohol intake as likely causation. His MCV is 119, more deficient from previous value of 107 over the last year. WBC 4.8, Hgb 11.9. RBC 3.27, platelets are 79 and previously 90. His daily alcohol consumption is of  concern for being the main cause of thrombocytopenia and additional pancytopenia. He states understanding of this assessment. We discussed alcohol cessation and he is not interested in stopping his drinking at this time. His wife inquired about how to approach cessation. I did advise not to cold turkey alcohol for risk of seizures and other withdrawal symptoms including nausea, vomiting, chills, headache, tremors, anxiety, hallucinations, sweating, racing heart, and altered mental. Suggestion [facility] or [facility], counseling services, for cessation guidance. Advised to also follow with PCP for additional assistance. Mr. Mccarty wife is also likely suffering from his alcohol consumption habits.  She endorses concern if he were to stop cold turkey as well, as she identifies a likely addiction.     Resources:  [facility] Hotline: 1-736.862.7283   Alcohol Anonymous [facility] www.[facility].org or 1-459.154.9011     Additional Observations, history, and referrals:  He is wheel chair bound other than pivot and transfer independently. His physical activity is limited by physical pain back and joints. Obesity noted. He has followed inpatient cardiology 09/2021 and 11/2022 for severe aortic stenosis and a-flutter. Follow cardiology yearly for ECHO and assessment. He is on Xarelto and ASA. Followed GI 08/31/2017 for colonoscopy for LLQ pain. Redundant colon noted. No specimens taken. Repeat five years recommended.   Referral to hepatology for evaluation of liver disease following US and previous CT results.   I have reviewed the patient's MHR of all notes, labs, imaging, procedures to assist in evaluation of health. Some of his health records were obtained outside resources [facility].    Follow-up:  RTC:  -- 3 months with labs (02/2024)    Medications:  -Continue supplements as prescribed-new B12, thiamine, folic acid sent to pharmacy    Imaging:  -no imaging ordered today     Referral/Consult:  -11/30/2023 Hepatology   -01/02/2024  urology  -01/11/2024 nephrology   -Cardiology as scheduled   -GI as scheduled  -PCP as scheduled/needed    Discussed the results of laboratory work-up in detail.  Patient states understanding and agreeable to plan of care.  Patient will call with any questions or concerns.          Patient Instructions:      Instructions Type: Healthy lifestyle and nutrition and activity, alcohol cessation.     Thank you allowing me to care for you today.    Sincerely,  Vickie Srivastava, APRN-CNP

## 2023-11-30 NOTE — PATIENT INSTRUCTIONS
Call us in a few months to discuss the plans from the hematology team and whether they feel that a liver biopsy is essential.    Try to avoid absolutely all alcohol use.    Work on diet, weight loss and exercise.

## 2023-11-30 NOTE — PROGRESS NOTES
HEPATOLOGY NEW PATIENT VISIT    November 30, 2023    Dr. Luis Eduardo Kay       Patient Name:   ALY CANCINO  Medical Record Number: [id number]  YOB: 1950    Dear Dr. Kay,    I had the pleasure of seeing Aly Cancino (along with his wife) for consultation in the [facility] ([facility] office). History and physical examination was performed. Pertinent available laboratory, imaging, pathology results were reviewed.     History of Present Illness:   The patient is a 73 year old white male who is referred for evaluation of a stable liver cyst and apparently a concern for advanced liver disease from the hematology team.    The patient has been undergoing extensive work-up for pancytopenia and recurrent infections.  As part of that, he had imaging studies that showed a liver cyst and he was referred to me for further evaluation.    In fact, he has been noted to have the cyst since at least 2014 and there has been no change in the cyst size or configuration.  However, he was referred to me for further evaluation.    It sounds as if he was referred to me because the hematology APN who has been following him for macrocytosis and pancytopenia was very concerned that he could have cirrhosis.  In fact, he drinks 3-4 alcoholic beverages per day and has for many years.  There have been times where he was too sick and was in the nursing home or rehab.  During those times, his platelet counts actually improved.  He has received blood transfusions and platelet transfusions in the past.    Other than the liver cyst, they have never been told of any liver disease in the past.    The patient denied any past history of acute hepatitis or jaundice.      He has never had any manifestations of liver disease including no jaundice, ascites, hepatic encephalopathy, or variceal bleeding. He denied ever having a liver biopsy.    He presented today for evaluation.  He denied any specific  liver-related complaints.  He does have issues with pain and difficulty ambulating because of a nonfused right foot.  He is in a wheelchair.    Past Medical/Surgical History:   Atrial fibrillation, unspecified type (CMS/HCC)  Morbid (severe) obesity due to excess calories (CMS/HCC)  Type 2 diabetes mellitus without complication, without long-term current use of insulin (CMS/HCC)  Chronic kidney disease, stage 3a (CMS/HCC)  Abdominal pain  Abnormal gait  Acute kidney failure (CMS/HCC)  Adult onset hypothyroidism  Anemia  Aortic valve stenosis  Asthma with COPD with exacerbation (CMS/HCC)  Benign essential hypertension  Bilateral sensorineural hearing loss  Bilevel positive airway pressure (BPAP) dependence  Cellulitis  Combined hyperlipidemia  Decreased mobility  Diabetes mellitus (CMS/HCC)  Dizziness  ED (erectile dysfunction)  Esophageal varices (CMS/HCC)  Foreign body in ear  Gastroesophageal reflux disease with esophagitis  Hepatic cyst  Idiopathic peripheral neuropathy  Impaired fasting glucose  Kidney disease  Low back pain  Malnutrition of moderate degree (CMS/HCC)  Obstructive sleep apnea  Open wound of knee, leg (except thigh), and ankle, complicated  Peptic ulcer hemorrhage  Renal dysfunction  Small intestinal bacterial overgrowth  Spinal stenosis  Tinnitus of both ears  Type III open fracture of ankle  Vitamin B12 deficiency  Vitamin D deficiency  Weakness  Atrial fibrillation, chronic (CMS/HCC)  Hereditary and idiopathic neuropathy  Nonrheumatic aortic (valve) stenosis  Pancytopenia (CMS/HCC)  Personal history of nicotine dependence  Sleep related hypoventilation in conditions classified elsewhere  Spinal stenosis, lumbar region without neurogenic claudication  Atrial thrombus  Noncompliance with CPAP treatment  S/P TAVR (transcatheter aortic valve replacement)    Family History:   There is no known family history of liver disease.  There is no known family history of colon cancer.  His niece had  leukemia.    Social History:   He denied tobacco use.  He drinks about 3-4 hard drinks daily.  He has had blood transfusions.  He denied tattoos.  He denied intravenous drug use.    Review of systems: As noted above.  Does have muscle pain and joint stiffness.  He does report some mental fogginess that only improves with alcohol use.  He does have ED.  He does have nocturia.  No fever, chills, weight loss, visual changes, auditory changes, shortness of breath, chest pain, abdominal pain, GI bleeding, diarrhea, constipation, depression, dysuria, hematuria, or rash.       Medical, Surgical, Family, and Social Histories  Past Medical History:   Diagnosis Date    Personal history of diseases of the blood and blood-forming organs and certain disorders involving the immune mechanism     History of hemorrhagic diathesis    Personal history of other diseases of the circulatory system     History of hypertension    Personal history of other diseases of the circulatory system 12/22/2022    History of aortic valve stenosis    Personal history of other diseases of the circulatory system 10/10/2021    History of chronic atrial fibrillation    Personal history of other diseases of the musculoskeletal system and connective tissue     History of spinal stenosis    Personal history of other diseases of the musculoskeletal system and connective tissue     History of arthritis    Personal history of other diseases of the nervous system and sense organs     History of sleep apnea       Past Surgical History:   Procedure Laterality Date    OTHER SURGICAL HISTORY  08/08/2019    PICC line insertion    OTHER SURGICAL HISTORY  08/08/2019    Leg surgery    OTHER SURGICAL HISTORY  07/07/2021    Tonsillectomy with adenoidectomy       Family History   Problem Relation Name Age of Onset    Hypertension Mother      Breast cancer Mother      Kidney disease Father          CKD    Peripheral vascular disease Father      Cancer Sibling         Social  History     Socioeconomic History    Marital status:      Spouse name: Not on file    Number of children: Not on file    Years of education: Not on file    Highest education level: Not on file   Occupational History    Not on file   Tobacco Use    Smoking status: Never    Smokeless tobacco: Never   Vaping Use    Vaping Use: Never used   Substance and Sexual Activity    Alcohol use: Yes     Alcohol/week: 21.0 standard drinks of alcohol     Types: 21 Standard drinks or equivalent per week    Drug use: Yes     Types: Marijuana     Comment: gummies    Sexual activity: Not on file   Other Topics Concern    Not on file   Social History Narrative    Not on file     Social Determinants of Health     Financial Resource Strain: Not on file   Food Insecurity: Not on file   Transportation Needs: Not on file   Physical Activity: Not on file   Stress: Not on file   Social Connections: Not on file   Intimate Partner Violence: Not on file   Housing Stability: Not on file       Allergies and Current Medications  Allergies   Allergen Reactions    Levofloxacin Angioedema and Hives     Current Outpatient Medications   Medication Sig Dispense Refill    B-12 Plus 5,000-100 mcg tablet, sublingual TAKE 1 TABLET SUBLINGUALLY EVERY DAY      carvedilol (Coreg) 25 mg tablet Take 1 tablet (25 mg) by mouth 2 times a day with meals. Take with food.      cyanocobalamin (Vitamin B-12) 1,000 mcg tablet Take 2 tablets (2,000 mcg) by mouth once daily.      folic acid (Folvite) 1 mg tablet Take 1 tablet (1 mg) by mouth once daily.      hydrALAZINE (Apresoline) 50 mg tablet TAKE 1 TABLET BY MOUTH THREE TIMES A  tablet 1    hydroCHLOROthiazide (HYDRODiuril) 12.5 mg tablet TAKE 1 TABLET BY MOUTH ONCE DAILY. 30 tablet 5    levothyroxine (Synthroid, Levoxyl) 88 mcg tablet TAKE 1 TABLET BY MOUTH EVERY DAY ON EMPTY STOMACH 90 tablet 1    lisinopril 40 mg tablet Take 1 tablet (40 mg) by mouth once daily.      phenazopyridine (Pyridium) 200 mg  "tablet Take 1 tablet (200 mg) by mouth 3 times a day with meals.      thiamine 100 mg tablet Take 1 tablet (100 mg) by mouth once daily.      Xarelto 20 mg tablet Take 1 tablet (20 mg) by mouth once daily in the evening. Take with meals.      albuterol 2.5 mg /3 mL (0.083 %) nebulizer solution Take 3 mL (2.5 mg) by nebulization every 2 hours if needed for wheezing or shortness of breath.       No current facility-administered medications for this visit.        Physical Exam  /82   Pulse 52   Ht 1.905 m (6' 3\")   Wt (!) 155 kg (341 lb 11.4 oz)   SpO2 95%   BMI 42.71 kg/m²       Physical Examination:   General Appearance: alert and in no acute distress.  He is in a wheelchair today.  HEENT: oropharynx without lesions. Anicteric sclerae.   Neck supple, nontender, without adenopathy, thyromegaly, or JVD.   Lungs clear to auscultation and percussion.   Heart bradycardic but regular without murmurs, rubs, or gallops.   Abdomen: Soft, nontender, bowel sounds positive, without obvious ascites. Liver and spleen not palpable.  He does have central obesity.  Extremities full ROM, no atrophy, normal strength.  2+ edema in the right lower extremity.  Skin no specific lesions.   Neurological exam nonfocal, alert and oriented.  No asterixis.  No spider angiomata, or palmar erythema.     Labs 8/24/2023 hepatitis C RNA undetectable, hepatitis B surface antibody positive, hepatitis C antibody negative, hepatitis B surface antigen negative, HIV negative, glucose 105, sodium 128, creatinine 1.89, protein 6.8, albumin 4, alk phos 51, bilirubin 0.6, AST 18, ALT 12, INR 1.6, WBC 3.9, hemoglobin 12.3, , platelets 90, ferritin 137, iron saturation 40%.    Labs 7/12/2023 hemoglobin A1c 4.8%, cholesterol 181, , triglycerides 119.    Ultrasound 8/31/2023:  IMPRESSION:  Small amount of nonshadowing calculi versus sludge within the  dependent aspect of the gallbladder. No additional sonographic  evidence of acute " cholecystitis.     Hepatomegaly with similar appearance of large hepatic cyst of 5.4 cm within the left lobe of the liver.      CAT scan with contrast 10/10/2022:  IMPRESSION:  1. Moderate to severe atherosclerotic changes involving the  thoracoabdominal aorta and its branches. The access vessels are  patent.  2. Severe calcifications of the aortic valve correlating with history  of aortic stenosis.  3. Moderate coronary artery calcifications are seen. Please note,the  study is not optimized for evaluation of coronary arteries and  correlate with patient's symptoms and cardiovascular risk factors.  4.  Mildly dilated main pulmonary artery and correlate with concern  for pulmonary hypertension.  5. Enlarged left atrium with a small filling defect within anterior  aspect of left atrial appendage, which may be related to circulatory  stasis versus a thrombus. Correlate with echocardiogram  6. Cholelithiasis with pericholecystic fat stranding. Correlate with  patient's symptoms and concern for acute cholecystitis. Right upper  quadrant ultrasound can be obtained if clinically warranted.  7. Suspected concentric wall thickening of urinary bladder and  correlate with concern for cystitis.  8. Interval slight increase in size of fat containing umbilical  hernia with now small amount of dependent fluid within hernia sac  also identified. Correlate with patient's symptoms.  9. Additional chronic findings as described above.    HEPATOBILIARY SYSTEM:  The liver is normal in size.  There is a well-circumscribed oval fluid attenuation lesion with  internal thin calcific septa noted within left hepatic lobe measuring  5.8 cm x 4.1 cm, which has decreased in size as compared to prior  scan from 2018 when it measured approximately 7.4 cm x 5.5 cm. The  findings favor underlying benign etiology.      CAT scan with contrast 7/11/2018 revealed a benign 6.6 x 5.3 cm left hepatic cyst.    Ultrasound 4/8/2014 revealed a 5.5 cm left  hepatic lobe cyst with internal septation.        Assessment/Plan:     1. Liver cyst  2. Concern for cirrhosis    The patient is referred to me for evaluation of a liver cyst.    As noted above, the patient has had this stable liver cyst in the left lobe of the liver since at least 2014.  There has been no progression of this since 2014.  For that reason, this is obviously a benign lesion but does not need to be followed any further.    There is no reason to assume that the cyst has anything to do with his pancytopenia.    He did have hepatitis C testing which was negative.    I get the sense that he was sent to me to determine if he has cirrhosis to explain his thrombocytopenia and macrocytosis.  In all honesty, I would be much more suspicious for alcohol induced thrombocytopenia and macrocytosis.  Obviously, he could have some type of a primary bone marrow issue.  Hematology has been following him and asked him to see me to determine if he has cirrhosis.  It is theoretically possible that he has alcohol induced liver injury or fatty liver disease or both.  However, of note, his spleen size has never been increased which would strongly argue against splenic sequestration or hypersplenism as the cause of his thrombocytopenia.    I did recommend that he try to avoid absolutely all alcohol use.  Ideally, he could get a platelet count checked by hematology a month after being sober from alcohol to see if his platelet count improves.  Of note, his platelet counts were much better in 2017 and 2018 when he likely was in a skilled rehab facility and was not drinking.    Obviously, hematology can determine whether a bone marrow biopsy is warranted.    For now, I do not see any reason to do a liver biopsy in the absence of any obvious evidence of advanced liver disease.  I will avoid doing a FibroScan because I think that it will be falsely positive given his alcohol use.  I did tell them to call us in a few months after  following up more with hematology.  If hematology is absolutely convinced he has cirrhosis and wants a liver biopsy, we would consider that at that time.    Thank you for allowing me to participate in the care of this patient. Please feel free to contact me with any questions regarding their care.     Sincerely,     Alcides Ohara MD, FAASLD, FACG.   Medical Director, Hepatology  Senior Attending Physician  [facility]  [facility]  Professor of Medicine  Division of Gastroenterology and Liver Disease  [employer]  [address] [place], [place] [address]  Phone: [phone]  Fax: [phone].    Cc: LA NENA Baum        This document was generated with a computerized dictation system.  Because of this, there could be errors in grammar and/or content.

## 2023-12-01 LAB
HAPTOGLOB SERPL-MCNC: 124 MG/DL (ref 37–246)
KAPPA LC SERPL-MCNC: 7.1 MG/DL (ref 0.33–1.94)
KAPPA LC/LAMBDA SER: 2.2 {RATIO} (ref 0.26–1.65)
LAMBDA LC SERPL-MCNC: 3.22 MG/DL (ref 0.57–2.63)

## 2023-12-03 RX ORDER — LANOLIN ALCOHOL/MO/W.PET/CERES
2000 CREAM (GRAM) TOPICAL 3 TIMES WEEKLY
Qty: 24 TABLET | Refills: 11 | Status: SHIPPED | OUTPATIENT
Start: 2023-12-04 | End: 2024-06-09

## 2023-12-04 ENCOUNTER — TELEPHONE (OUTPATIENT)
Dept: HEMATOLOGY/ONCOLOGY | Facility: HOSPITAL | Age: 73
End: 2023-12-04
Payer: MEDICARE

## 2023-12-04 LAB — VIT B1 PYROPHOSHATE BLD-SCNC: 123 NMOL/L (ref 70–180)

## 2023-12-04 NOTE — TELEPHONE ENCOUNTER
Lab called and the erythropoietin test was canceled due to not enough specimen. Test will be re-ordered at a later date if needed per CASSIE Srivastava CNP.

## 2023-12-05 LAB
ALBUMIN: 4.2 G/DL (ref 3.4–5)
ALPHA 1 GLOBULIN: 0.3 G/DL (ref 0.2–0.6)
ALPHA 2 GLOBULIN: 0.7 G/DL (ref 0.4–1.1)
BETA GLOBULIN: 0.7 G/DL (ref 0.5–1.2)
GAMMA GLOBULIN: 1.3 G/DL (ref 0.5–1.4)
HEMOGLOBIN A2: 2.3 % (ref 2–3.5)
HEMOGLOBIN A: 97.3 % (ref 95.8–98)
HEMOGLOBIN F: 0.4 % (ref 0–2)
HEMOGLOBIN IDENTIFICATION INTERPRETATION: NORMAL
IMMUNOFIXATION COMMENT: NORMAL
PATH REVIEW - SERUM IMMUNOFIXATION: NORMAL
PATH REVIEW-HGB IDENTIFICATION: NORMAL
PATH REVIEW-SERUM PROTEIN ELECTROPHORESIS: NORMAL
PROTEIN ELECTROPHORESIS COMMENT: NORMAL

## 2023-12-28 DIAGNOSIS — E03.8 OTHER SPECIFIED HYPOTHYROIDISM: ICD-10-CM

## 2023-12-28 RX ORDER — LEVOTHYROXINE SODIUM 88 UG/1
TABLET ORAL
Qty: 90 TABLET | Refills: 1 | Status: SHIPPED | OUTPATIENT
Start: 2023-12-28

## 2023-12-31 DIAGNOSIS — I10 ESSENTIAL (PRIMARY) HYPERTENSION: ICD-10-CM

## 2023-12-31 DIAGNOSIS — D61.818 PANCYTOPENIA (MULTI): Primary | ICD-10-CM

## 2024-01-02 ENCOUNTER — OFFICE VISIT (OUTPATIENT)
Dept: UROLOGY | Facility: HOSPITAL | Age: 74
End: 2024-01-02
Payer: MEDICARE

## 2024-01-02 DIAGNOSIS — N13.8 BPH WITH OBSTRUCTION/LOWER URINARY TRACT SYMPTOMS: Primary | ICD-10-CM

## 2024-01-02 DIAGNOSIS — N40.1 BPH WITH OBSTRUCTION/LOWER URINARY TRACT SYMPTOMS: Primary | ICD-10-CM

## 2024-01-02 PROCEDURE — 99204 OFFICE O/P NEW MOD 45 MIN: CPT | Performed by: STUDENT IN AN ORGANIZED HEALTH CARE EDUCATION/TRAINING PROGRAM

## 2024-01-02 PROCEDURE — 1126F AMNT PAIN NOTED NONE PRSNT: CPT | Performed by: STUDENT IN AN ORGANIZED HEALTH CARE EDUCATION/TRAINING PROGRAM

## 2024-01-02 PROCEDURE — 4010F ACE/ARB THERAPY RXD/TAKEN: CPT | Performed by: STUDENT IN AN ORGANIZED HEALTH CARE EDUCATION/TRAINING PROGRAM

## 2024-01-02 PROCEDURE — 1159F MED LIST DOCD IN RCRD: CPT | Performed by: STUDENT IN AN ORGANIZED HEALTH CARE EDUCATION/TRAINING PROGRAM

## 2024-01-02 PROCEDURE — 1036F TOBACCO NON-USER: CPT | Performed by: STUDENT IN AN ORGANIZED HEALTH CARE EDUCATION/TRAINING PROGRAM

## 2024-01-02 PROCEDURE — 99214 OFFICE O/P EST MOD 30 MIN: CPT | Performed by: STUDENT IN AN ORGANIZED HEALTH CARE EDUCATION/TRAINING PROGRAM

## 2024-01-02 RX ORDER — HYDRALAZINE HYDROCHLORIDE 50 MG/1
TABLET, FILM COATED ORAL
Qty: 270 TABLET | Refills: 0 | Status: SHIPPED | OUTPATIENT
Start: 2024-01-02 | End: 2024-04-01

## 2024-01-02 RX ORDER — TAMSULOSIN HYDROCHLORIDE 0.4 MG/1
0.4 CAPSULE ORAL DAILY
Qty: 30 CAPSULE | Refills: 11 | Status: SHIPPED | OUTPATIENT
Start: 2024-01-02 | End: 2025-01-01

## 2024-01-03 RX ORDER — FOLIC ACID 1 MG/1
1 TABLET ORAL DAILY
Qty: 90 TABLET | Refills: 1 | Status: SHIPPED | OUTPATIENT
Start: 2024-01-03

## 2024-01-03 NOTE — PROGRESS NOTES
Subjective   Aly Mccarty is a 73 y.o. who presents today for evaluation of lower urinary tract symptoms. The patient reports intermittency, straining, and urgency. He denies  hematuria . The patient states symptoms are of moderate severity. Onset of symptoms was several months ago and was gradual in onset. His AUA Symptom Score is 17/35 manifested as irritative symptoms including urgency. He has no personal history of prostate cancer. He reports a history of no complicating symptoms. He denies no complicating symptoms.    Objective   There were no vitals taken for this visit.  Physical Exam  Constitutional:       Appearance: Normal appearance.   HENT:      Head: Normocephalic and atraumatic.      Nose: Nose normal.   Pulmonary:      Effort: No respiratory distress.   Abdominal:      General: There is no distension.   Neurological:      Mental Status: He is alert.       Review of Systems   All other systems reviewed and are negative.     Lab Review    Lab Results   Component Value Date    PSA 1.24 07/12/2023       Assessment/Plan     73 year-old very pleasant gentleman presenting with the above condition. We had a very long and extensive discussion with the patient regarding the pathophysiology, differential diagnosis, risk factor, management, natural history, incidence and diagnostic work-up of the condition.     Today, we had a very long and extensive discussion with the patient regarding the pathophysiology, differential diagnosis, risk factor, associated condition, diagnostic work-up and management of BPH and lower urinary tract symptoms and acute urinary retention. I discussed with the patient the need to check his PSA to assess his prostate cancer risk. We discussed at length the mechanism of action, risk, benefit, adverse events and side effect of alpha-blocker in the form of tamsulosin 0.4 mg p.o nightly. We discussed in particular the risk of hypotension, lightheadedness, dizziness, and the risk of  fall and bone fracture. Also discussed retrograde ejaculation of the side effects of the medication. We had another discussion with the patient regarding lifestyle modifications including low fluid intake after 5 PM, timed voiding every 2 hours, and decrease caffeine intake. I discussed with the patient that in case he had an elevated PSA, we will proceed do an MRI of the prostate.    Plan  Follow up in 1 months  Lifestyle changes   Cysto  Renal US.  Flomax 0.4mg PO at bedtime, stop in case of side effects             Diagnoses and all orders for this visit:  BPH with obstruction/lower urinary tract symptoms  -     US renal complete; Future  -     Cystoscopy; Future  -     tamsulosin (Flomax) 0.4 mg 24 hr capsule; Take 1 capsule (0.4 mg) by mouth once daily.

## 2024-01-11 ENCOUNTER — APPOINTMENT (OUTPATIENT)
Dept: NEPHROLOGY | Facility: CLINIC | Age: 74
End: 2024-01-11
Payer: MEDICARE

## 2024-01-11 DIAGNOSIS — I10 PRIMARY HYPERTENSION: ICD-10-CM

## 2024-01-11 RX ORDER — HYDROCHLOROTHIAZIDE 12.5 MG/1
12.5 TABLET ORAL DAILY
Qty: 30 TABLET | Refills: 0 | Status: SHIPPED | OUTPATIENT
Start: 2024-01-11 | End: 2024-02-07

## 2024-01-15 ENCOUNTER — APPOINTMENT (OUTPATIENT)
Dept: RADIOLOGY | Facility: HOSPITAL | Age: 74
End: 2024-01-15
Payer: MEDICARE

## 2024-01-17 ENCOUNTER — ANCILLARY PROCEDURE (OUTPATIENT)
Dept: RADIOLOGY | Facility: HOSPITAL | Age: 74
End: 2024-01-17
Payer: MEDICARE

## 2024-01-17 DIAGNOSIS — N13.8 BPH WITH OBSTRUCTION/LOWER URINARY TRACT SYMPTOMS: ICD-10-CM

## 2024-01-17 DIAGNOSIS — N40.1 BPH WITH OBSTRUCTION/LOWER URINARY TRACT SYMPTOMS: ICD-10-CM

## 2024-01-17 PROCEDURE — 76770 US EXAM ABDO BACK WALL COMP: CPT | Performed by: RADIOLOGY

## 2024-01-17 PROCEDURE — 76770 US EXAM ABDO BACK WALL COMP: CPT

## 2024-01-24 ENCOUNTER — APPOINTMENT (OUTPATIENT)
Dept: UROLOGY | Facility: HOSPITAL | Age: 74
End: 2024-01-24
Payer: MEDICARE

## 2024-02-03 DIAGNOSIS — I10 PRIMARY HYPERTENSION: ICD-10-CM

## 2024-02-07 ENCOUNTER — PROCEDURE VISIT (OUTPATIENT)
Dept: UROLOGY | Facility: HOSPITAL | Age: 74
End: 2024-02-07
Payer: MEDICARE

## 2024-02-07 DIAGNOSIS — N40.1 BPH WITH OBSTRUCTION/LOWER URINARY TRACT SYMPTOMS: Primary | ICD-10-CM

## 2024-02-07 DIAGNOSIS — N13.8 BPH WITH OBSTRUCTION/LOWER URINARY TRACT SYMPTOMS: Primary | ICD-10-CM

## 2024-02-07 LAB
POC APPEARANCE, URINE: CLEAR
POC BILIRUBIN, URINE: NEGATIVE
POC BLOOD, URINE: ABNORMAL
POC COLOR, URINE: ABNORMAL
POC GLUCOSE, URINE: ABNORMAL MG/DL
POC KETONES, URINE: NEGATIVE MG/DL
POC LEUKOCYTES, URINE: ABNORMAL
POC NITRITE,URINE: POSITIVE
POC PH, URINE: 5.5 PH
POC PROTEIN, URINE: ABNORMAL MG/DL
POC SPECIFIC GRAVITY, URINE: 1.02
POC UROBILINOGEN, URINE: 1 EU/DL

## 2024-02-07 PROCEDURE — 99214 OFFICE O/P EST MOD 30 MIN: CPT | Performed by: STUDENT IN AN ORGANIZED HEALTH CARE EDUCATION/TRAINING PROGRAM

## 2024-02-07 PROCEDURE — 87086 URINE CULTURE/COLONY COUNT: CPT | Performed by: STUDENT IN AN ORGANIZED HEALTH CARE EDUCATION/TRAINING PROGRAM

## 2024-02-07 PROCEDURE — 81003 URINALYSIS AUTO W/O SCOPE: CPT | Performed by: STUDENT IN AN ORGANIZED HEALTH CARE EDUCATION/TRAINING PROGRAM

## 2024-02-07 PROCEDURE — 52000 CYSTOURETHROSCOPY: CPT | Performed by: STUDENT IN AN ORGANIZED HEALTH CARE EDUCATION/TRAINING PROGRAM

## 2024-02-07 RX ORDER — HYDROCHLOROTHIAZIDE 12.5 MG/1
12.5 TABLET ORAL DAILY
Qty: 90 TABLET | Refills: 0 | Status: SHIPPED | OUTPATIENT
Start: 2024-02-07 | End: 2024-05-03

## 2024-02-07 RX ORDER — CEFTRIAXONE 1 G/1
1 INJECTION, POWDER, FOR SOLUTION INTRAMUSCULAR; INTRAVENOUS ONCE
Status: SHIPPED | OUTPATIENT
Start: 2024-02-07

## 2024-02-07 RX ORDER — SULFAMETHOXAZOLE AND TRIMETHOPRIM 800; 160 MG/1; MG/1
1 TABLET ORAL 2 TIMES DAILY
Qty: 10 TABLET | Refills: 0 | Status: SHIPPED | OUTPATIENT
Start: 2024-02-07 | End: 2024-02-12

## 2024-02-07 NOTE — PROGRESS NOTES
Patient ID: Aly Mccarty is a 73 y.o. male.    Cystoscopy    Date/Time: 2/7/2024 3:19 PM    Performed by: Alfredito Lozada MD MPH  Authorized by: Alfredito Lozada MD MPH    Procedure - Bladder Cystoscopy:     Procedure details: cystoscopy    PROCEDURE NOTE:    PREOPERATIVE DIAGNOSIS:  Retention urine     POSTOPERATIVE DIAGNOSIS:  Same    OPERATION:  Flexible Cystourethroscopy      SURGEON:  Alfredito Lozada MD MPH    ANESTHESIA:  2%  lidocaine jelly    COMPLICATIONS:  None    EBL: Minimal        DISPOSITION:  The patient was discharged home after the procedure, per routine.    INDICATIONS: :  Mr. Mccarty is a 73 y.o. patient with a history of LUTs and retention of urine who presents today for Cystoscopy.     The indications, risks and benefits of this procedure were discussed with the patient, consent was obtained prior to the procedure, and to the best of my judgement the patient seemed to understand and agree to the procedure.    PROCEDURE:  The patient  was brought into the procedure suite and informed consent was reviewed and confirmed. Vital signs were obtained prior to the procedure: There were no vitals taken for this visit..  The patient was escorted onto the stretcher, placed supine, prepped with betadine and draped in the usual standard surgical fashion.  Intraurethral 2% viscous lidocaine jelly was used for local analgesia.  A 16 Somali flexible cystourethroscope was inserted into the urethra.   The penile urethra was normal.  The prostate urethra was enlarged.  Severe urinary retention PVR 700cc  Upon entering the bladder the entire bladder was surveyed in a 360 degree fashion.  The left and right ureteral orifices were in normal orthotopic position effluxing clear yellow urine, bilaterally.   There was no evidence of any bladder lesions, foreign objects, stones or evidence of any mucosal changes. The cystoscope was then retroflexed.  The bladder neck was then further examined without any  evidence of lesions. The scope was then removed and in an antegrade fashion, the urethra and bladder were again resurveyed with no evidence of additional lesions.  The cystoscope was then fully removed.   The patient tolerated the procedure well.  Vitals were stable after the procedure.  The patient was able to void and was discharged home.  Verbal and written Post procedure instructions were reviewed with the patient.    IMPRESSION:  Severe urinary retention PVR 700cc    PLAN:  Keep Mendoza for 10 days

## 2024-02-07 NOTE — TELEPHONE ENCOUNTER
Requested Prescriptions     Pending Prescriptions Disp Refills    hydroCHLOROthiazide (HYDRODiuril) 12.5 mg tablet [Pharmacy Med Name: HYDROCHLOROTHIAZIDE 12.5 MG TB] 90 tablet 0     Sig: TAKE 1 TABLET BY MOUTH EVERY DAY

## 2024-02-07 NOTE — PROGRESS NOTES
Subjective   Aly Mccarty is a 73 y.o. who presents today for evaluation of lower urinary tract symptoms. The patient reports intermittency, straining, and urgency. He denies  hematuria . The patient states symptoms are of moderate severity. Onset of symptoms was several months ago and was gradual in onset. His AUA Symptom Score is 17/35 manifested as irritative symptoms including urgency. He has no personal history of prostate cancer. He reports a history of no complicating symptoms. He denies no complicating symptoms.    Objective   There were no vitals taken for this visit.  Physical Exam  Constitutional:       Appearance: Normal appearance.   HENT:      Head: Normocephalic and atraumatic.      Nose: Nose normal.   Pulmonary:      Effort: No respiratory distress.   Abdominal:      General: There is no distension.   Neurological:      Mental Status: He is alert.       Review of Systems   All other systems reviewed and are negative.     Lab Review    Lab Results   Component Value Date    PSA 1.24 07/12/2023     Procedure:  Procedure:    The patient was prepped using a Betadine solution. Lidocaine jelly was instilled into the urethra. The flexible cystoscope was sterilely inserted into the urethra and formal cystoscopy performed in a systematic fashion. For detailed findings of the procedure, please see Dr. Lozada’s remarks below  Scope A used, Cipro 250mg p.o. BID times 3 days given      Assessment/Plan   Cystoscopy for LUTs, hx of bacteriemia, septicemia and urosepsis sp prolonged ICU stay, urinary retention today with 700cc PVR    73 year-old very pleasant gentleman presenting with the above condition. We had a very long and extensive discussion with the patient regarding the pathophysiology, differential diagnosis, risk factor, management, natural history, incidence and diagnostic work-up of the condition.     Today, we had a very long and extensive discussion with the patient regarding the pathophysiology,  differential diagnosis, risk factor, associated condition, diagnostic work-up and management of BPH and lower urinary tract symptoms and acute urinary retention. I discussed with the patient the need to check his PSA to assess his prostate cancer risk. We discussed at length the mechanism of action, risk, benefit, adverse events and side effect of alpha-blocker in the form of tamsulosin 0.4 mg p.o nightly. We discussed in particular the risk of hypotension, lightheadedness, dizziness, and the risk of fall and bone fracture. Also discussed retrograde ejaculation of the side effects of the medication. We had another discussion with the patient regarding lifestyle modifications including low fluid intake after 5 PM, timed voiding every 2 hours, and decrease caffeine intake. I discussed with the patient that in case he had an elevated PSA, we will proceed do an MRI of the prostate.    Severe urinary retention PVR 700cc  Plan  - Follow up in 10 days  - Pt will need CIC teaching after voiding trial  - Keep Flomax

## 2024-02-08 LAB — BACTERIA UR CULT: ABNORMAL

## 2024-02-20 ENCOUNTER — OFFICE VISIT (OUTPATIENT)
Dept: UROLOGY | Facility: HOSPITAL | Age: 74
End: 2024-02-20
Payer: MEDICARE

## 2024-02-20 DIAGNOSIS — N10 ACUTE PYELONEPHRITIS: Primary | ICD-10-CM

## 2024-02-20 PROCEDURE — 1036F TOBACCO NON-USER: CPT | Performed by: STUDENT IN AN ORGANIZED HEALTH CARE EDUCATION/TRAINING PROGRAM

## 2024-02-20 PROCEDURE — 1157F ADVNC CARE PLAN IN RCRD: CPT | Performed by: STUDENT IN AN ORGANIZED HEALTH CARE EDUCATION/TRAINING PROGRAM

## 2024-02-20 PROCEDURE — 1126F AMNT PAIN NOTED NONE PRSNT: CPT | Performed by: STUDENT IN AN ORGANIZED HEALTH CARE EDUCATION/TRAINING PROGRAM

## 2024-02-20 PROCEDURE — 4010F ACE/ARB THERAPY RXD/TAKEN: CPT | Performed by: STUDENT IN AN ORGANIZED HEALTH CARE EDUCATION/TRAINING PROGRAM

## 2024-02-20 PROCEDURE — 99214 OFFICE O/P EST MOD 30 MIN: CPT | Performed by: STUDENT IN AN ORGANIZED HEALTH CARE EDUCATION/TRAINING PROGRAM

## 2024-02-20 PROCEDURE — 1159F MED LIST DOCD IN RCRD: CPT | Performed by: STUDENT IN AN ORGANIZED HEALTH CARE EDUCATION/TRAINING PROGRAM

## 2024-02-20 NOTE — PROGRESS NOTES
Subjective   Aly Mccarty is a 73 y.o. who presents today for evaluation of lower urinary tract symptoms. The patient reports intermittency, straining, and urgency. He denies  hematuria . The patient states symptoms are of moderate severity. Onset of symptoms was several months ago and was gradual in onset. His AUA Symptom Score is 17/35 manifested as irritative symptoms including urgency. He has no personal history of prostate cancer. He reports a history of no complicating symptoms. He denies no complicating symptoms.    Lab Review    Lab Results   Component Value Date    PSA 1.24 07/12/2023     Objective   There were no vitals taken for this visit.  Physical Exam  Constitutional:       Appearance: Normal appearance.   HENT:      Head: Normocephalic and atraumatic.      Nose: Nose normal.   Pulmonary:      Effort: No respiratory distress.   Abdominal:      General: There is no distension.   Neurological:      Mental Status: He is alert.       Review of Systems   All other systems reviewed and are negative.         Assessment/Plan    hx of bacteriemia, septicemia and urosepsis sp prolonged ICU stay, urinary retention    Mendoza catheter was in place and removed successfully    73 year-old very pleasant gentleman presenting with the above condition. We had a very long and extensive discussion with the patient regarding the pathophysiology, differential diagnosis, risk factor, management, natural history, incidence and diagnostic work-up of the condition.     Today, we had a very long and extensive discussion with the patient regarding the pathophysiology, differential diagnosis, risk factor, associated condition, diagnostic work-up and management of BPH and lower urinary tract symptoms and acute urinary retention. I discussed with the patient the need to check his PSA to assess his prostate cancer risk. We discussed at length the mechanism of action, risk, benefit, adverse events and side effect of alpha-blocker  in the form of tamsulosin 0.4 mg p.o nightly. We discussed in particular the risk of hypotension, lightheadedness, dizziness, and the risk of fall and bone fracture. Also discussed retrograde ejaculation of the side effects of the medication. We had another discussion with the patient regarding lifestyle modifications including low fluid intake after 5 PM, timed voiding every 2 hours, and decrease caffeine intake. I discussed with the patient that in case he had an elevated PSA, we will proceed do an MRI of the prostate.      Plan  -  CIS 4-5 times daily 16 Fr regular   - Follow up in 6 weeks

## 2024-02-22 ENCOUNTER — APPOINTMENT (OUTPATIENT)
Dept: UROLOGY | Facility: HOSPITAL | Age: 74
End: 2024-02-22
Payer: MEDICARE

## 2024-02-28 NOTE — PROGRESS NOTES
Patient Visit Information:   Visit Type: Follow Up Visit      History of Present Illness:   Hematology History:  Patient is a 73-year-old male that presents with his wife, Sole, for follow up of pancytopenia. Patient presents in a wheelchair.  Outside lab records indicate pancytopenia ongoing since 2014.  Wife states he has been hospitalized in 2017 for over 5 months for osteomyelitis and septicemia.  He has chronic urinary tract infections. Last imaging CT chest abdomen and pelvis in 2022 reveal CAD, atherosclerosis, calcified aortic valve, pulmonary hypertension, dilated pulmonary artery and hernia.    Head CT in 2021 revealed maxillary sinus thickening but no acute issues.   It was also noted in 2017 that he had internal bleeding and a colonoscopy and EGD was performed in which the patient states has resolved. Platelet and blood transfusions administered.    ID Statement:    PAMELA CANCINO is a 73 year old Male        Chief Complaint: Follow up for pancytopenia   Interval History:       He presents today in  with wife, Sole. Reports feels better.   He denies any headache, lymphadenopathy, fever, night sweats, shortness of breath, chest pain or palpitations, abdominal pain, constipation, diarrhea, nausea, vomiting. Urinary issues are chronic and include dysuria, frequency and urgency.  Nocturia and now straight cathing while following urology. He has sleep apnea and is on a CPAP.  Patient denies any issues with appetite, or weight loss. Continues daily alcohol intake of 4 double bourbon. He has appointment with Nephrology coming up.    Imaging Studies:  US abdomen on 08/31/2023 shows calculi vs sludge in gallbladder, hepatomegaly with similar appearance of hepatic cyst in left lobe of liver. Previous CT abdomen 04/11/2014 shows fatty liver, cyst in left lobe, portal HTN, likely cirrhosis of the liver. Borderline splenomegaly. Repeat CT abdomen 07/22/2018 shows hepatic cysts in left lobe, suggest benign  etiology. Renal US 2017 and 2020, both unremarkable. On 2019 Ct Pelvis shows umbilical hernia and no prostate abscess. US renal complete 2024 Essentially negative renal ultrasound.        Medical history:  -Hypertension  -Aortic valve stenosis  -Chronic UTI  -A-fib on Xarelto  -Hearing loss sensorineural  -Dizziness and vertigo  -Erectile dysfunction  -GERD  -Idiopathic neuropathy  -Back pain  -Obesity  -Peptic ulcer hemorrhage  -Vitamin D3 def  -Vitamin B12  -Tinnitus      Social History:  - lives with wife Sole and two children (boy and girl)   -retired  -Daily alcohol intake   -Denies smoking or suing tobacco   -Occasional Marijuana use  -No illicit drug uses      Family History:  -mom  52 years breast cancer  -dad  84 years vascular disease   -Niece leukemia  as a teenager  -Sister with hypertension  -Daughter immune disease  -Brother myocardial infarction      Review of Systems:   System Review-All other systems including Neurologic, Cardiac, Gastrointestinal, Endocrine, Dermatologic, ENT, Respiratory, Infectious, Urologic, Musculoskeletal  have been reviewed and are negative for complaint outside of events noted below and within the assessment.         Allergies and Intolerances:       Allergies   Allergen Reactions    Levofloxacin Angioedema and Hives      Outpatient Medication Profile:  Current Outpatient Medications   Medication Instructions    albuterol 2.5 mg, nebulization, Every 2 hour PRN    B complex-vitamin C-folic acid (Nephro-Chi Rx) 1- mg-mg-mcg tablet 1 tablet, oral, Daily with breakfast    B-12 Plus 5,000-100 mcg tablet, sublingual TAKE 1 TABLET SUBLINGUALLY EVERY DAY    carvedilol (COREG) 25 mg, oral, 2 times daily with meals, Take with food.    cyanocobalamin (VITAMIN B-12) 2,000 mcg, oral, 3 times weekly    folic acid (FOLVITE) 1 mg, oral, Daily    hydrALAZINE (Apresoline) 50 mg tablet TAKE 1 TABLET BY MOUTH THREE TIMES A DAY     "hydroCHLOROthiazide (MICROZIDE) 12.5 mg, oral, Daily    levothyroxine (Synthroid, Levoxyl) 88 mcg tablet TAKE 1 TABLET BY MOUTH EVERY DAY ON EMPTY STOMACH    lisinopril 40 mg, oral, Daily    phenazopyridine (PYRIDIUM) 200 mg, oral, 3 times daily with meals    tamsulosin (FLOMAX) 0.4 mg, oral, Daily    thiamine (VITAMIN B-1) 100 mg, oral, Daily    Xarelto 20 mg, oral, Daily with evening meal      Past Medical History:   Diagnosis Date    Personal history of diseases of the blood and blood-forming organs and certain disorders involving the immune mechanism     History of hemorrhagic diathesis    Personal history of other diseases of the circulatory system     History of hypertension    Personal history of other diseases of the circulatory system 12/22/2022    History of aortic valve stenosis    Personal history of other diseases of the circulatory system 10/10/2021    History of chronic atrial fibrillation    Personal history of other diseases of the musculoskeletal system and connective tissue     History of spinal stenosis    Personal history of other diseases of the musculoskeletal system and connective tissue     History of arthritis    Personal history of other diseases of the nervous system and sense organs     History of sleep apnea      Performance:   ECOG Performance Status: 2- Ambulatory 50% of waking  hours          Vitals and Measurements:       11/4/2022    11:13 AM 11/11/2022    10:11 AM 6/26/2023    10:42 AM 10/2/2023    11:41 AM 11/30/2023    10:34 AM 11/30/2023     1:30 PM 2/29/2024    10:14 AM   Vitals   Systolic 122 168 177 153 167 138 138   Diastolic 76 94 90 68 77 82 81   Heart Rate 58 46 70  52 52 70   Temp  36.3 °C (97.3 °F)  36.2 °C (97.1 °F) 36.5 °C (97.7 °F)  36.2 °C (97.2 °F)   Resp     18  18   Height (in)  1.905 m (6' 3\") 1.905 m (6' 3\")   1.905 m (6' 3\") 1.84 m (6' 0.44\")    Weight (lb) 328 328 332  342.71 341.71 341.82   BMI 41 kg/m2 41 kg/m2 41.5 kg/m2  42.84 kg/m2 42.71 kg/m2 45.8 " kg/m2   BSA (m2) 2.81 m2 2.81 m2 2.83 m2  2.86 m2 2.86 m2 2.81 m2   Visit Report   Report  Report    Report Report    Report Report       Significant value    Multiple values from one day are sorted in reverse-chronological order      Physical Exam:      Constitutional: Well developed, awake/alert/oriented  x3, no distress, alert and cooperative   Eyes: PERRL, EOMI, clear sclera   ENMT: mucous membranes moist, no apparent injury,  no lesions seen   Head/Neck: Neck supple, no apparent injury, thyroid  without mass or tenderness, No JVD, trachea midline, no bruits   Respiratory/Thorax: Patent airways, CTAB, diminished bases bilateral, normal  breath sounds with good chest expansion, thorax symmetric   Cardiovascular: arrhythmia, no murmurs, swelling in bilateral legs plus one   Gastrointestinal: Obese, soft, non-tender, no rebound tenderness or guarding, unable to palpate liver or spleen   Musculoskeletal: ROM intact, no joint swelling, normal  strength   Extremities: Swelling right ankle, brace, no cyanosis, no contusions or wounds, no clubbing, mild swelling ankles.   Neurological: alert and oriented x3, intact senses,  motor, response and reflexes, normal strength for baseline   Lymphatic: No significant cervical or subclavicular  lymphadenopathy   Psychological: Appropriate mood and behavior. Assessment of alcohol cessation, patient does not want to quit drinking at this time   Skin: Warm and dry, pink cheeks, no lesions, no rashes      Lab Results:  Labs:  Lab Results   Component Value Date    WBC 4.0 (L) 02/29/2024    NEUTROABS 2.69 02/29/2024    IGABSOL 0.01 02/29/2024    LYMPHSABS 0.73 (L) 02/29/2024    MONOSABS 0.30 02/29/2024    EOSABS 0.24 02/29/2024    BASOSABS 0.02 02/29/2024    RBC 3.06 (L) 02/29/2024     (H) 02/29/2024    MCHC 31.4 (L) 02/29/2024    HGB 10.8 (L) 02/29/2024    HCT 34.4 (L) 02/29/2024    PLT 87 (L) 02/29/2024     Lab Results   Component Value Date    RETICCTPCT 3.3 (H) 02/29/2024       Lab Results   Component Value Date    CREATININE 1.83 (H) 02/29/2024    BUN 42 (H) 02/29/2024    EGFR 39 (L) 02/29/2024     (L) 02/29/2024    K 4.7 02/29/2024     02/29/2024    CO2 21 02/29/2024      Lab Results   Component Value Date    ALT 12 02/29/2024    AST 15 02/29/2024    ALKPHOS 48 02/29/2024    BILITOT 0.7 02/29/2024      Lab Results   Component Value Date    TSH CANCELED 08/24/2023     Lab Results   Component Value Date    TSH CANCELED 08/24/2023     Lab Results   Component Value Date    IRON 144 02/29/2024    TIBC 332 02/29/2024    FERRITIN 185 02/29/2024      Lab Results   Component Value Date    XKQLYFSF21 1,671 (H) 11/30/2023      Lab Results   Component Value Date    FOLATE >24.0 11/30/2023     Lab Results   Component Value Date    SEDRATE 6 08/24/2023      Lab Results   Component Value Date    CRP CANCELED 08/24/2023     Lab Results   Component Value Date     02/29/2024     Lab Results   Component Value Date    HAPTOGLOBIN 124 11/30/2023     Lab Results   Component Value Date    SPEP Normal. 11/30/2023        Assessment and Plan:   Pleasant 73-year-old male presents for pancytopenia follow-up.  Discussed likely contributors below along with not being able to R/O MDS. Also discussed daily alcohol use and chronic urinary tract infections as playing a role in his pancytopenia.  Pathologist review of CBCd reveal macrocytic anemia. Supplements of vital vitamin nutrients advised and prescribed. Will assess copper if leukopenia persists.  Discussed the nutritional deficiency, liver disease and alcohol intake as likely causation. His MCV is 112, more deficient from previous value of 107-119 over the last year. WBC 4.8, Hgb 10.8 and dropping while taking supplements. RBC 3.07 and WBC 4.0, platelets are 87 and previously 79. His daily alcohol consumption is of concern for being the main cause of thrombocytopenia and additional pancytopenia. He states understanding of this assessment.      We discussed alcohol cessation and he is not interested in stopping his drinking at this time. Advised to also follow with PCP for additional assistance. Advised safe cessation and to reach out to a reputable resource.     Resources for ETOH:  [facility] Hotline: 1-174.296.4342   Alcohol Anonymous [facility] www.aacle.org or 1-823.932.8774     Additional Observations, history, and referrals:  He is wheel chair bound other than pivot and transfer independently. His physical activity is limited by physical pain back and joints. Obesity noted. He has followed inpatient cardiology 09/2021 and 11/2022 for severe aortic stenosis and a-flutter. Follow cardiology yearly for ECHO and assessment. He is on Xarelto and ASA. Followed GI 08/31/2017 for colonoscopy for LLQ pain. Redundant colon noted. No specimens taken. Repeat five years recommended.   Please we reviewed bone marrow biopsy as suggested due to pancytopenia persistent despite interventions nutritional supplements.  Patient states he will think about this and let me know.  Not agree to a bone marrow biopsy today.  Patient could have IBS secondary to alcohol abuse.  Cannot rule out MDS.  Referral to hepatology for evaluation of liver disease following US and previous CT results.   I have reviewed the patient's MHR of all notes, labs, imaging, procedures to assist in evaluation of health. Some of his health records were obtained outside resources [facility].    Follow-up:  RTC:  -- 2 months with labs week before 5/2/2024    Medications:  -Continue supplements as prescribed-new B12, thiamine, folic acid     Imaging:  -no imaging ordered today     Referral/Consult:  -4/15/2024 nephrology   -PCP as scheduled/needed    Discussed the results of laboratory work-up in detail.  Patient states understanding and agreeable to plan of care.  Patient will call with any questions or concerns.          Patient Instructions:      Instructions Type: Healthy lifestyle and nutrition and activity,  alcohol cessation.     Thank you allowing me to care for you today.    Sincerely,  RICARDA Baum-CNP

## 2024-02-29 ENCOUNTER — OFFICE VISIT (OUTPATIENT)
Dept: HEMATOLOGY/ONCOLOGY | Facility: CLINIC | Age: 74
End: 2024-02-29
Payer: MEDICARE

## 2024-02-29 VITALS
SYSTOLIC BLOOD PRESSURE: 138 MMHG | WEIGHT: 315 LBS | OXYGEN SATURATION: 94 % | DIASTOLIC BLOOD PRESSURE: 81 MMHG | RESPIRATION RATE: 18 BRPM | TEMPERATURE: 97.2 F | HEART RATE: 70 BPM | HEIGHT: 72 IN | BODY MASS INDEX: 42.66 KG/M2

## 2024-02-29 DIAGNOSIS — D61.818 PANCYTOPENIA (MULTI): Primary | ICD-10-CM

## 2024-02-29 DIAGNOSIS — D50.9 IRON DEFICIENCY ANEMIA, UNSPECIFIED IRON DEFICIENCY ANEMIA TYPE: ICD-10-CM

## 2024-02-29 DIAGNOSIS — N28.9 KIDNEY DISEASE: ICD-10-CM

## 2024-02-29 LAB
ALBUMIN SERPL BCP-MCNC: 4 G/DL (ref 3.4–5)
ALP SERPL-CCNC: 48 U/L (ref 33–136)
ALT SERPL W P-5'-P-CCNC: 12 U/L (ref 10–52)
ANION GAP SERPL CALC-SCNC: 14 MMOL/L (ref 10–20)
APTT PPP: 38 SECONDS (ref 27–38)
AST SERPL W P-5'-P-CCNC: 15 U/L (ref 9–39)
B2 MICROGLOB SERPL-MCNC: 5.4 MG/L (ref 0.7–2.2)
BASOPHILS # BLD AUTO: 0.02 X10*3/UL (ref 0–0.1)
BASOPHILS NFR BLD AUTO: 0.5 %
BILIRUB DIRECT SERPL-MCNC: 0.2 MG/DL (ref 0–0.3)
BILIRUB SERPL-MCNC: 0.7 MG/DL (ref 0–1.2)
BUN SERPL-MCNC: 42 MG/DL (ref 6–23)
CALCIUM SERPL-MCNC: 9 MG/DL (ref 8.6–10.3)
CHLORIDE SERPL-SCNC: 104 MMOL/L (ref 98–107)
CO2 SERPL-SCNC: 21 MMOL/L (ref 21–32)
CREAT SERPL-MCNC: 1.83 MG/DL (ref 0.5–1.3)
DAT-POLYSPECIFIC: NORMAL
EGFRCR SERPLBLD CKD-EPI 2021: 39 ML/MIN/1.73M*2
EOSINOPHIL # BLD AUTO: 0.24 X10*3/UL (ref 0–0.4)
EOSINOPHIL NFR BLD AUTO: 6 %
ERYTHROCYTE [DISTWIDTH] IN BLOOD BY AUTOMATED COUNT: 13.7 % (ref 11.5–14.5)
FERRITIN SERPL-MCNC: 185 NG/ML (ref 20–300)
GLUCOSE SERPL-MCNC: 119 MG/DL (ref 74–99)
HCT VFR BLD AUTO: 34.4 % (ref 41–52)
HCYS SERPL-SCNC: 15.4 UMOL/L (ref 5–13.9)
HGB BLD-MCNC: 10.8 G/DL (ref 13.5–17.5)
HGB RETIC QN: 38 PG (ref 28–38)
IGA SERPL-MCNC: 323 MG/DL (ref 70–400)
IGG SERPL-MCNC: 1500 MG/DL (ref 700–1600)
IGM SERPL-MCNC: 126 MG/DL (ref 40–230)
IMM GRANULOCYTES # BLD AUTO: 0.01 X10*3/UL (ref 0–0.5)
IMM GRANULOCYTES NFR BLD AUTO: 0.3 % (ref 0–0.9)
IMMATURE RETIC FRACTION: 25.1 %
INR PPP: 1.6 (ref 0.9–1.1)
IRON SATN MFR SERPL: 43 % (ref 25–45)
IRON SERPL-MCNC: 144 UG/DL (ref 35–150)
LDH SERPL L TO P-CCNC: 146 U/L (ref 84–246)
LYMPHOCYTES # BLD AUTO: 0.73 X10*3/UL (ref 0.8–3)
LYMPHOCYTES NFR BLD AUTO: 18.3 %
MCH RBC QN AUTO: 35.3 PG (ref 26–34)
MCHC RBC AUTO-ENTMCNC: 31.4 G/DL (ref 32–36)
MCV RBC AUTO: 112 FL (ref 80–100)
MONOCYTES # BLD AUTO: 0.3 X10*3/UL (ref 0.05–0.8)
MONOCYTES NFR BLD AUTO: 7.5 %
NEUTROPHILS # BLD AUTO: 2.69 X10*3/UL (ref 1.6–5.5)
NEUTROPHILS NFR BLD AUTO: 67.4 %
PLATELET # BLD AUTO: 87 X10*3/UL (ref 150–450)
POTASSIUM SERPL-SCNC: 4.7 MMOL/L (ref 3.5–5.3)
PROT SERPL-MCNC: 7.2 G/DL (ref 6.4–8.2)
PROT SERPL-MCNC: 7.3 G/DL (ref 6.4–8.2)
PROTHROMBIN TIME: 18.5 SECONDS (ref 9.8–12.8)
RBC # BLD AUTO: 3.06 X10*6/UL (ref 4.5–5.9)
RETICS #: 0.1 X10*6/UL (ref 0.02–0.11)
RETICS/RBC NFR AUTO: 3.3 % (ref 0.5–2)
SODIUM SERPL-SCNC: 134 MMOL/L (ref 136–145)
TIBC SERPL-MCNC: 332 UG/DL (ref 240–445)
UIBC SERPL-MCNC: 188 UG/DL (ref 110–370)
WBC # BLD AUTO: 4 X10*3/UL (ref 4.4–11.3)

## 2024-02-29 PROCEDURE — 99214 OFFICE O/P EST MOD 30 MIN: CPT

## 2024-02-29 PROCEDURE — 3079F DIAST BP 80-89 MM HG: CPT

## 2024-02-29 PROCEDURE — 4010F ACE/ARB THERAPY RXD/TAKEN: CPT

## 2024-02-29 PROCEDURE — 1036F TOBACCO NON-USER: CPT

## 2024-02-29 PROCEDURE — 36415 COLL VENOUS BLD VENIPUNCTURE: CPT

## 2024-02-29 PROCEDURE — 1126F AMNT PAIN NOTED NONE PRSNT: CPT

## 2024-02-29 PROCEDURE — 1157F ADVNC CARE PLAN IN RCRD: CPT

## 2024-02-29 PROCEDURE — 86880 COOMBS TEST DIRECT: CPT

## 2024-02-29 PROCEDURE — 1160F RVW MEDS BY RX/DR IN RCRD: CPT

## 2024-02-29 PROCEDURE — 3075F SYST BP GE 130 - 139MM HG: CPT

## 2024-02-29 PROCEDURE — 1159F MED LIST DOCD IN RCRD: CPT

## 2024-02-29 ASSESSMENT — PAIN SCALES - GENERAL: PAINLEVEL: 0-NO PAIN

## 2024-02-29 ASSESSMENT — COLUMBIA-SUICIDE SEVERITY RATING SCALE - C-SSRS
6. HAVE YOU EVER DONE ANYTHING, STARTED TO DO ANYTHING, OR PREPARED TO DO ANYTHING TO END YOUR LIFE?: NO
1. IN THE PAST MONTH, HAVE YOU WISHED YOU WERE DEAD OR WISHED YOU COULD GO TO SLEEP AND NOT WAKE UP?: NO
2. HAVE YOU ACTUALLY HAD ANY THOUGHTS OF KILLING YOURSELF?: NO

## 2024-02-29 ASSESSMENT — PATIENT HEALTH QUESTIONNAIRE - PHQ9
2. FEELING DOWN, DEPRESSED OR HOPELESS: NOT AT ALL
SUM OF ALL RESPONSES TO PHQ9 QUESTIONS 1 AND 2: 0
1. LITTLE INTEREST OR PLEASURE IN DOING THINGS: NOT AT ALL

## 2024-03-01 LAB
G6PD RBC QL: NORMAL
KAPPA LC SERPL-MCNC: 7.69 MG/DL (ref 0.33–1.94)
KAPPA LC/LAMBDA SER: 2.15 {RATIO} (ref 0.26–1.65)
LAMBDA LC SERPL-MCNC: 3.58 MG/DL (ref 0.57–2.63)

## 2024-03-02 LAB
EPO SERPL-ACNC: 54 MU/ML (ref 4–27)
PCA IGG SER-ACNC: 8.1 UNITS (ref 0–24.9)

## 2024-03-03 LAB
IF BLOCK AB SER QL RIA: NEGATIVE
PYRIDOXAL PHOS SERPL-SCNC: 171.8 NMOL/L (ref 20–125)

## 2024-03-06 ENCOUNTER — TELEPHONE (OUTPATIENT)
Dept: HEMATOLOGY/ONCOLOGY | Facility: CLINIC | Age: 74
End: 2024-03-06
Payer: MEDICARE

## 2024-03-06 ENCOUNTER — TELEPHONE (OUTPATIENT)
Dept: HEMATOLOGY/ONCOLOGY | Facility: HOSPITAL | Age: 74
End: 2024-03-06
Payer: MEDICARE

## 2024-03-06 LAB
ALBUMIN: 4.1 G/DL (ref 3.4–5)
ALPHA 1 GLOBULIN: 0.3 G/DL (ref 0.2–0.6)
ALPHA 2 GLOBULIN: 0.6 G/DL (ref 0.4–1.1)
BETA GLOBULIN: 0.8 G/DL (ref 0.5–1.2)
GAMMA GLOBULIN: 1.5 G/DL (ref 0.5–1.4)
IMMUNOFIXATION COMMENT: ABNORMAL
PATH REVIEW - SERUM IMMUNOFIXATION: ABNORMAL
PATH REVIEW-SERUM PROTEIN ELECTROPHORESIS: ABNORMAL
PROTEIN ELECTROPHORESIS COMMENT: ABNORMAL

## 2024-03-06 NOTE — TELEPHONE ENCOUNTER
Patient needs to come in for a redraw of his copper and zinc.        Wife, Sole, returned the call. Notified Aly will need to go to the closest  lab and have his copper and zinc redrawn. Verbalized understanding.

## 2024-03-08 LAB
CHROM ANALY OVERALL INTERP-IMP: NORMAL
COLD AGGLUTININ TITER: NORMAL
ELECTRONICALLY COSIGNED BY CYTOGENETICS: NORMAL
ELECTRONICALLY SIGNED BY CYTOGENETICS: NORMAL
STRUCT VAR ISCN NAME: NORMAL

## 2024-03-30 DIAGNOSIS — I10 ESSENTIAL (PRIMARY) HYPERTENSION: ICD-10-CM

## 2024-04-01 RX ORDER — HYDRALAZINE HYDROCHLORIDE 50 MG/1
TABLET, FILM COATED ORAL
Qty: 270 TABLET | Refills: 0 | Status: SHIPPED | OUTPATIENT
Start: 2024-04-01

## 2024-04-15 ENCOUNTER — OFFICE VISIT (OUTPATIENT)
Dept: NEPHROLOGY | Facility: CLINIC | Age: 74
End: 2024-04-15
Payer: MEDICARE

## 2024-04-15 VITALS
TEMPERATURE: 98 F | HEART RATE: 53 BPM | DIASTOLIC BLOOD PRESSURE: 82 MMHG | HEIGHT: 74 IN | SYSTOLIC BLOOD PRESSURE: 144 MMHG | WEIGHT: 315 LBS | RESPIRATION RATE: 16 BRPM | BODY MASS INDEX: 40.43 KG/M2 | OXYGEN SATURATION: 92 %

## 2024-04-15 DIAGNOSIS — N18.32 CHRONIC KIDNEY DISEASE, STAGE 3B (MULTI): Primary | ICD-10-CM

## 2024-04-15 DIAGNOSIS — I12.9 HYPERTENSIVE CHRONIC KIDNEY DISEASE WITH STAGE 1 THROUGH STAGE 4 CHRONIC KIDNEY DISEASE, OR UNSPECIFIED CHRONIC KIDNEY DISEASE: ICD-10-CM

## 2024-04-15 DIAGNOSIS — M10.00 IDIOPATHIC GOUT, UNSPECIFIED CHRONICITY, UNSPECIFIED SITE: ICD-10-CM

## 2024-04-15 DIAGNOSIS — N28.9 KIDNEY DISEASE: ICD-10-CM

## 2024-04-15 PROCEDURE — 1159F MED LIST DOCD IN RCRD: CPT | Performed by: INTERNAL MEDICINE

## 2024-04-15 PROCEDURE — 1160F RVW MEDS BY RX/DR IN RCRD: CPT | Performed by: INTERNAL MEDICINE

## 2024-04-15 PROCEDURE — 99205 OFFICE O/P NEW HI 60 MIN: CPT | Performed by: INTERNAL MEDICINE

## 2024-04-15 PROCEDURE — 3079F DIAST BP 80-89 MM HG: CPT | Performed by: INTERNAL MEDICINE

## 2024-04-15 PROCEDURE — 1157F ADVNC CARE PLAN IN RCRD: CPT | Performed by: INTERNAL MEDICINE

## 2024-04-15 PROCEDURE — 3077F SYST BP >= 140 MM HG: CPT | Performed by: INTERNAL MEDICINE

## 2024-04-15 PROCEDURE — 4010F ACE/ARB THERAPY RXD/TAKEN: CPT | Performed by: INTERNAL MEDICINE

## 2024-04-15 RX ORDER — LORATADINE 10 MG/1
10 TABLET ORAL DAILY
COMMUNITY

## 2024-04-15 NOTE — PROGRESS NOTES
"Subjective       Aly Mccarty is a 74 y.o. male who has past medical history of pancytopenia, chronic UTI, chronic urine retention status post self-catheterization, aortic valve stenosis status post TAVR, hypertension, coronary artery disease, chronic kidney disease was coming to see me for initial consultation for CKD management per PCP    Patient came today with his wife \"Sole \".  No continued complaints or concerns.  He follows with urology for chronic urinary retention.  He self catheterize himself 1-twice daily.  He checks blood pressure at home and average reading 120//80.  Blood pressure is in target today.  He has chronic kidney disease for many years.  He had kidney failure 6-7 years ago that he was on dialysis while inpatient.  He tries to follow low-salt diet.  No significant NSAID use at home.  He reports possible gout flares.  No nephrolithiasis    Social history: , remote history of smoking, occasional marijuana  Surgical history: TAVR  Family history: Irrelevant      Objective   /82 (BP Location: Left arm, Patient Position: Sitting, BP Cuff Size: Large adult)   Pulse 53   Temp 36.7 °C (98 °F)   Resp 16   Ht 1.88 m (6' 2\")   Wt (!) 152 kg (336 lb)   SpO2 92%   BMI 43.14 kg/m²   Wt Readings from Last 3 Encounters:   04/15/24 (!) 152 kg (336 lb)   02/29/24 (!) 155 kg (341 lb 13.2 oz)   11/30/23 (!) 155 kg (341 lb 11.4 oz)       Physical Exam    General appearance: no distress awake and alert on room air, obese, no significant hypervolemia exam  Eyes: non-icteric  HEENT: atrumatic head, PEERLA, moist mucosa  Skin: no apparent rash  Heart: NSR, S1, S2 normal, no murmur or gallop  Lungs: Symmetrical expansion,CTA bilat no wheezing/crackles  Abdomen: soft, nt/nd, obese  Extremities: no edema bilat  Neuro: No FND,asterixis, no focal deficits noticed        Review of Systems     Constitutional: no fever, no chills, no recent weight gain and no recent weight loss.   Eyes: no " "blurred vision and no diplopia.   ENT: no hearing loss, no earache, no sore throat, no swollen glands in the neck and no nasal discharge.   Cardiovascular: no chest pain, no palpitations and no lower extremity edema.   Respiratory: no shortness of breath, no chronic cough and no shortness of breath during exertion.   Gastrointestinal: no abdominal pain, no constipation, no heartburn, no vomiting, no bloody stools and no change in bowel movements.   Genitourinary: no dysuria and no hematuria.   Musculoskeletal: no arthralgias and no myalgias.   Skin: no rashes and no skin lesions.   Neurological: no headaches and no dizziness.   Psychiatric: no confusion, no depression and no anxiety.   Endocrine: no heat intolerance, no cold intolerance, appetite not increased, no thyroid disorder, no increased urinary frequency and no dry skin.   Hematologic/Lymphatic: does not bleed easily and does not bruise easily.   All other systems have been reviewed and are negative for complaint.         Data Review                   Lab Results   Component Value Date    URICACID CANCELED 08/24/2023           Lab Results   Component Value Date    HGBA1C 4.8 07/12/2023                 No lab exists for component: \"CR\", \"PHOSPHORUS\"        No results found for: \"MICROALBCREA\"         RFP  Recent Labs     02/29/24  1014 11/30/23  1017 08/24/23  1315 07/12/23  0936 12/09/22  1044 11/29/22  0947 11/23/22  0519   * 134* 128* 132* 134* 135* 140   K 4.7 5.1 4.2 4.2 4.2 4.4 4.0    101 96* 97* 102 102 107   CO2 21 22 22 27 25 26 25   BUN 42* 36* 36* 28* 20 22 17   CREATININE 1.83* 2.24* 1.89* 1.83* 1.36* 1.76* 1.40*   GLUCOSE 119* 108* 105* 92 100* 90 96   CALCIUM 9.0 8.9 9.0 8.9 8.9 9.3 8.7   PHOS  --   --  2.7  --   --   --  2.9   EGFR 39* 30*  --   --   --   --   --    ANIONGAP 14 16 14 12 11 11 12        Urineanalysis  Recent Labs     02/07/24  1523 12/09/22  1157 11/04/22  1115 10/28/22  1100 05/04/20  1246 05/15/19  1000 " 04/01/19  1100 02/12/19  1032 01/09/19  0942 12/20/18  1715 07/11/18  1050   COLORU Orange* YELLOW YELLOW KIM YELLOW KIM YELLOW RED YELLOW YELLOW RED   APPEARANCEU Clear CLEAR HAZY HAZY HAZY HAZY HAZY CLOUDY HAZY HAZY TURBID   SPECGRAVU 1.020 1.010 1.004* 1.008 1.006 1.009 1.009 1.010 1.005 1.008 1.005   HUGH 5.5 6.5 7.0 6.0 7.0 6.0 6.0 6.0 6.0 5.0 7.0   PROTUR 30 (1+) NEGATIVE NEGATIVE 30(1+)* NEGATIVE NEGATIVE NEGATIVE 100 (2+)* NEGATIVE NEGATIVE >=500(3+)*   GLUCOSEU 100 (1+)* NEGATIVE NEGATIVE NEGATIVE NEGATIVE NEGATIVE NEGATIVE NEGATIVE NEGATIVE NEGATIVE NEGATIVE   BLOODU LARGE (3+)* TRACE* MODERATE (2+)* MODERATE(2+)* SMALL(1+)* LARGE(3+)* SMALL(1+)* LARGE (3+)* SMALL(1+)* MODERATE(2+)* LARGE(3+)*   KETONESU NEGATIVE NEGATIVE NEGATIVE NEGATIVE NEGATIVE NEGATIVE NEGATIVE NEGATIVE NEGATIVE NEGATIVE NEGATIVE   BILIRUBINU NEGATIVE NEGATIVE NEGATIVE NEGATIVE NEGATIVE NEGATIVE NEGATIVE NEGATIVE NEGATIVE NEGATIVE NEGATIVE   NITRITEU POSITIVE* NEGATIVE NEGATIVE NEGATIVE POSITIVE* POSITIVE* NEGATIVE NEGATIVE NEGATIVE NEGATIVE NEGATIVE   LEUKOCYTESU  --  LARGE (3+)* LARGE (3+)* LARGE(3+)* LARGE(3+)* LARGE(3+)* LARGE(3+)* LARGE (3+)* LARGE(3+)* LARGE(3+)* LARGE(3+)*       Urine Electrolytes  Recent Labs     02/07/24  1523 12/09/22  1157 11/04/22  1115 10/28/22  1100 05/04/20  1246 05/15/19  1000 04/01/19  1100 02/12/19  1032 01/09/19  0942 12/20/18  1715 07/11/18  1050   PROTUR 30 (1+) NEGATIVE NEGATIVE 30(1+)* NEGATIVE NEGATIVE NEGATIVE 100 (2+)* NEGATIVE NEGATIVE >=500(3+)*        Urine Micro  Recent Labs     12/09/22  1157 11/12/22  0800 11/04/22  1115 10/28/22  1100 05/04/20  1246 05/15/19  1000 04/01/19  1100 02/12/19  1032 01/09/19  0942 12/20/18  1715 07/11/18  1050 11/30/17  1400 10/06/17  0400   WBCU 55* 115* 35* >182* >182* >182* >182* 93* >182* >182* * >182* >100*   RBCU <1 3 5 25* 12* 111* 18* >182* 14* 7* >100* 2 0-5   SQUAMEPIU <1  --   --  3 <1 4  --  6 <1  --   --  <1  --    BACTERIAU 1+* 3+* 4+*  2+* 3+* 4+* 1+*  --  1+* 1+*  --   --  3+*   MUCUSU FEW 2+ 4+ 1+ 2+ FEW  --   --   --   --   --   --   --         Iron  Recent Labs     02/29/24  1014 11/30/23  1017 08/24/23  1328 08/24/23  1315   IRON 144 137 CANCELED 119   TIBC 332 347 CANCELED 300   IRONSAT 43 39 CANCELED 40   FERRITIN 185 104 CANCELED 137          Current Outpatient Medications on File Prior to Visit   Medication Sig Dispense Refill    B complex-vitamin C-folic acid (Nephro-Chi Rx) 1- mg-mg-mcg tablet Take 1 tablet by mouth once daily with a meal. 30 tablet 11    carvedilol (Coreg) 25 mg tablet Take 1 tablet (25 mg) by mouth 2 times a day with meals. Take with food.      cyanocobalamin (Vitamin B-12) 1,000 mcg tablet Take 2 tablets (2,000 mcg) by mouth 3 times a week. 24 tablet 11    folic acid (Folvite) 1 mg tablet TAKE 1 TABLET BY MOUTH EVERY DAY 90 tablet 1    hydrALAZINE (Apresoline) 50 mg tablet TAKE 1 TABLET BY MOUTH THREE TIMES A DAY (Patient taking differently: 2 times a day.) 270 tablet 0    hydroCHLOROthiazide (HYDRODiuril) 12.5 mg tablet TAKE 1 TABLET BY MOUTH EVERY DAY 90 tablet 0    levothyroxine (Synthroid, Levoxyl) 88 mcg tablet TAKE 1 TABLET BY MOUTH EVERY DAY ON EMPTY STOMACH 90 tablet 1    lisinopril 40 mg tablet Take 1 tablet (40 mg) by mouth once daily.      phenazopyridine (Pyridium) 200 mg tablet Take 1 tablet (200 mg) by mouth 3 times a day with meals.      tamsulosin (Flomax) 0.4 mg 24 hr capsule Take 1 capsule (0.4 mg) by mouth once daily. 30 capsule 11    thiamine 100 mg tablet Take 1 tablet (100 mg) by mouth once daily.      Xarelto 20 mg tablet Take 1 tablet (20 mg) by mouth once daily in the evening. Take with meals.      loratadine (Claritin) 10 mg tablet Take 1 tablet (10 mg) by mouth once daily.       Current Facility-Administered Medications on File Prior to Visit   Medication Dose Route Frequency Provider Last Rate Last Admin    cefTRIAXone (Rocephin) vial 1 g  1 g intramuscular Once Alfredito Lozada MD  [facility]               Assessment and Plan       Aly Mccarty  is a 74 y.o. male who has past medical history of  pancytopenia, chronic UTI, chronic urine retention status post self-catheterization, aortic valve stenosis status post TAVR, hypertension, coronary artery disease, chronic kidney disease was coming to see me for initial consultation for CKD management per PCP    Impression    # Chronic kidney disease -G3 B/A2-possible atherosclerotic cardiovascular disease/prior acute kidney injury  -Patient was on dialysis many years ago as an inpatient and the setting of sepsis  -Baseline serum creatinine 1.8-2.2, GFR 30-40 mill per minute per 1.73 m²-currently stable  -Will follow conservative measures.  Continue RAAS inhibitor/lisinopril 40 mg  -Kidney ultrasound done January 2024 was reviewed and showed B kidneys bilateral with no masses or hydronephrosis      # Chronic mild hyponatremia sodium 134-we will monitor      # BP - today at office is in target with negative orthostatic hypotension  -Current medication carvedilol 25 mg twice daily, hydrochlorothiazide 12.5 mg, lisinopril 40 mg, hydralazine 50 mg twice daily  -No changes      #Anemia/pancytopenia-most likely CKD related.  Most likely bone marrow related.  Continue to follow with hematology.  Will consider MEENA as needed-no immediate indication    #(CKD)-MBD  -Within normal calcium, phosphorus, albumin.  Recheck PTH and vitamin D    # CVS  -Aortic valve stenosis status post TAVR  -Continue RAAS inhibitors.  Currently is not on statins or SGL 2 inhibitors    # No history of diabetes      # Chronic urine retention-he follows with urology.  He self catheterize himself 1-2 times daily.  No evidence of urinary retention or hydronephrosis per most recent kidney ultrasound done in January 2024    # Possible history of recurrent gout-will monitor uric acid.  Currently is not on uric acid lowering therapy    #Others  - No NSAIDs, no contrast as possible. If to be  done- we recommend holding ACEi/ARBS/diuretics 24 hrs prior to contrast exposure and ensure appropriate hydration   - Ensure well hydration  - Limit salt in diet  - No smoking    Patient received CKD education and counselling      Follow-up in 6 months with renal order contrasted prior to next visit    Milli Mott MD, MS, ANNIKA CHOI   Clinical  - [employer]   Nephrologist - [facility]

## 2024-04-15 NOTE — PATIENT INSTRUCTIONS
Dear Aly it was nice meeting you nephrology clinic today-discussed the following    # Chronic kidney disease stage III baseline kidney function 30-40%.  Currently stable    # Hypertension-in good control.  Continue carvedilol 25 mg twice daily, hydrochlorothiazide 12.5 mg, lisinopril 40 mg and hydralazine 50 mg twice daily      # Anemia/pancytopenia-continue to follow the blood doctor.  Less likely kidney related    # Possible gout-recurrent-will monitor uric acid    # Chronic urine retention/chronic UTI-continue to follow with urology    Follow-up in 6 months with repeat blood work and urinalysis prior to next visit    Milli Mott MD, MS, ANNIKA CHOI   Clinical  - [facility] [facility] [employer] [facility] [employer]   Nephrologist - [facility]

## 2024-05-02 ENCOUNTER — OFFICE VISIT (OUTPATIENT)
Dept: HEMATOLOGY/ONCOLOGY | Facility: CLINIC | Age: 74
End: 2024-05-02
Payer: MEDICARE

## 2024-05-02 VITALS
SYSTOLIC BLOOD PRESSURE: 149 MMHG | HEART RATE: 55 BPM | RESPIRATION RATE: 18 BRPM | DIASTOLIC BLOOD PRESSURE: 72 MMHG | BODY MASS INDEX: 43.14 KG/M2 | TEMPERATURE: 97.2 F | OXYGEN SATURATION: 94 % | WEIGHT: 315 LBS

## 2024-05-02 DIAGNOSIS — N28.9 KIDNEY DISEASE: ICD-10-CM

## 2024-05-02 DIAGNOSIS — D61.818 PANCYTOPENIA (MULTI): Primary | ICD-10-CM

## 2024-05-02 DIAGNOSIS — I12.9 HYPERTENSIVE CHRONIC KIDNEY DISEASE WITH STAGE 1 THROUGH STAGE 4 CHRONIC KIDNEY DISEASE, OR UNSPECIFIED CHRONIC KIDNEY DISEASE: ICD-10-CM

## 2024-05-02 DIAGNOSIS — N18.32 CHRONIC KIDNEY DISEASE, STAGE 3B (MULTI): ICD-10-CM

## 2024-05-02 DIAGNOSIS — M10.00 IDIOPATHIC GOUT, UNSPECIFIED CHRONICITY, UNSPECIFIED SITE: ICD-10-CM

## 2024-05-02 DIAGNOSIS — D50.9 IRON DEFICIENCY ANEMIA, UNSPECIFIED IRON DEFICIENCY ANEMIA TYPE: ICD-10-CM

## 2024-05-02 LAB
25(OH)D3 SERPL-MCNC: 35 NG/ML (ref 30–100)
ALBUMIN SERPL BCP-MCNC: 3.9 G/DL (ref 3.4–5)
ALP SERPL-CCNC: 55 U/L (ref 33–136)
ALT SERPL W P-5'-P-CCNC: 14 U/L (ref 10–52)
ANION GAP SERPL CALC-SCNC: 17 MMOL/L (ref 10–20)
AST SERPL W P-5'-P-CCNC: 22 U/L (ref 9–39)
BASOPHILS # BLD AUTO: 0.03 X10*3/UL (ref 0–0.1)
BASOPHILS NFR BLD AUTO: 0.7 %
BILIRUB SERPL-MCNC: 0.5 MG/DL (ref 0–1.2)
BUN SERPL-MCNC: 36 MG/DL (ref 6–23)
CALCIUM SERPL-MCNC: 9 MG/DL (ref 8.6–10.3)
CHLORIDE SERPL-SCNC: 103 MMOL/L (ref 98–107)
CO2 SERPL-SCNC: 18 MMOL/L (ref 21–32)
CREAT SERPL-MCNC: 1.87 MG/DL (ref 0.5–1.3)
EGFRCR SERPLBLD CKD-EPI 2021: 37 ML/MIN/1.73M*2
EOSINOPHIL # BLD AUTO: 0.16 X10*3/UL (ref 0–0.4)
EOSINOPHIL NFR BLD AUTO: 4 %
ERYTHROCYTE [DISTWIDTH] IN BLOOD BY AUTOMATED COUNT: 13 % (ref 11.5–14.5)
FERRITIN SERPL-MCNC: 248 NG/ML (ref 20–300)
GLUCOSE SERPL-MCNC: 119 MG/DL (ref 74–99)
HCT VFR BLD AUTO: 29.7 % (ref 41–52)
HGB BLD-MCNC: 9 G/DL (ref 13.5–17.5)
IMM GRANULOCYTES # BLD AUTO: 0.04 X10*3/UL (ref 0–0.5)
IMM GRANULOCYTES NFR BLD AUTO: 1 % (ref 0–0.9)
IRON SATN MFR SERPL: 31 % (ref 25–45)
IRON SERPL-MCNC: 88 UG/DL (ref 35–150)
LYMPHOCYTES # BLD AUTO: 0.8 X10*3/UL (ref 0.8–3)
LYMPHOCYTES NFR BLD AUTO: 19.8 %
MCH RBC QN AUTO: 36.1 PG (ref 26–34)
MCHC RBC AUTO-ENTMCNC: 30.3 G/DL (ref 32–36)
MCV RBC AUTO: 119 FL (ref 80–100)
MONOCYTES # BLD AUTO: 0.29 X10*3/UL (ref 0.05–0.8)
MONOCYTES NFR BLD AUTO: 7.2 %
NEUTROPHILS # BLD AUTO: 2.72 X10*3/UL (ref 1.6–5.5)
NEUTROPHILS NFR BLD AUTO: 67.3 %
NRBC BLD-RTO: ABNORMAL /100{WBCS}
OVALOCYTES BLD QL SMEAR: NORMAL
PHOSPHATE SERPL-MCNC: 3.7 MG/DL (ref 2.5–4.9)
PLATELET # BLD AUTO: 85 X10*3/UL (ref 150–450)
POTASSIUM SERPL-SCNC: 5 MMOL/L (ref 3.5–5.3)
PROT SERPL-MCNC: 7.2 G/DL (ref 6.4–8.2)
PTH-INTACT SERPL-MCNC: 18.5 PG/ML (ref 18.5–88)
RBC # BLD AUTO: 2.49 X10*6/UL (ref 4.5–5.9)
RBC MORPH BLD: NORMAL
SODIUM SERPL-SCNC: 133 MMOL/L (ref 136–145)
TIBC SERPL-MCNC: 288 UG/DL (ref 240–445)
UIBC SERPL-MCNC: 200 UG/DL (ref 110–370)
URATE SERPL-MCNC: 8 MG/DL (ref 4–7.5)
WBC # BLD AUTO: 4 X10*3/UL (ref 4.4–11.3)

## 2024-05-02 PROCEDURE — 1160F RVW MEDS BY RX/DR IN RCRD: CPT

## 2024-05-02 PROCEDURE — 99214 OFFICE O/P EST MOD 30 MIN: CPT

## 2024-05-02 PROCEDURE — 1159F MED LIST DOCD IN RCRD: CPT

## 2024-05-02 PROCEDURE — 3078F DIAST BP <80 MM HG: CPT

## 2024-05-02 PROCEDURE — 1125F AMNT PAIN NOTED PAIN PRSNT: CPT

## 2024-05-02 PROCEDURE — 36415 COLL VENOUS BLD VENIPUNCTURE: CPT

## 2024-05-02 PROCEDURE — 3077F SYST BP >= 140 MM HG: CPT

## 2024-05-02 PROCEDURE — 1157F ADVNC CARE PLAN IN RCRD: CPT

## 2024-05-02 PROCEDURE — 4010F ACE/ARB THERAPY RXD/TAKEN: CPT

## 2024-05-02 ASSESSMENT — COLUMBIA-SUICIDE SEVERITY RATING SCALE - C-SSRS
6. HAVE YOU EVER DONE ANYTHING, STARTED TO DO ANYTHING, OR PREPARED TO DO ANYTHING TO END YOUR LIFE?: NO
2. HAVE YOU ACTUALLY HAD ANY THOUGHTS OF KILLING YOURSELF?: NO
1. IN THE PAST MONTH, HAVE YOU WISHED YOU WERE DEAD OR WISHED YOU COULD GO TO SLEEP AND NOT WAKE UP?: NO

## 2024-05-02 ASSESSMENT — PAIN SCALES - GENERAL: PAINLEVEL: 4

## 2024-05-02 NOTE — PROGRESS NOTES
Patient Visit Information:   Visit Type: Follow Up Visit      History of Present Illness:   Hematology History:  Patient is a 74-year-old male that presents with his wife, Sole, for follow up of pancytopenia. Patient presents in a wheelchair.  Outside lab records indicate pancytopenia ongoing since 2014.  Wife states he has been hospitalized in 2017 for over 5 months for osteomyelitis and septicemia.  He has chronic urinary tract infections. Last imaging CT chest abdomen and pelvis in 2022 reveal CAD, atherosclerosis, calcified aortic valve, pulmonary hypertension, dilated pulmonary artery and hernia.    Head CT in 2021 revealed maxillary sinus thickening but no acute issues.   It was also noted in 2017 that he had internal bleeding and a colonoscopy and EGD was performed in which the patient states has resolved. Platelet and blood transfusions administered.    ID Statement:    PAMELA CANCINO is a 74 year old Male        Chief Complaint: Follow up for pancytopenia   Interval History:      He presents today in  with wife, Sole.     Decreased appetite for a few days, no weight loss. No bleeding or bruising from any location. Sinuses closed up a week ago. On Claritin now and congestion better.  Hematuria in urine though is straight cathing, unsure of cause.   Weakness and lethargic over 3 weeks.     He denies any headache, lymphadenopathy, fever, night sweats, shortness of breath, chest pain or palpitations, abdominal pain, constipation, diarrhea, nausea, vomiting, bloody stool or dark stool. Urinary issues are chronic and include dysuria, frequency and urgency.  Recently saw Nephrology who reports kidney function stable CKD. Nocturia continues. He has sleep apnea and is on a CPAP.  Patient denies any issues with appetite, or weight loss. Continues daily alcohol intake of 4 double bourbon despite advise for cessation.     Imaging Studies:  US abdomen on 08/31/2023 shows calculi vs sludge in gallbladder,  hepatomegaly with similar appearance of hepatic cyst in left lobe of liver. Previous CT abdomen 2014 shows fatty liver, cyst in left lobe, portal HTN, likely cirrhosis of the liver. Borderline splenomegaly. Repeat CT abdomen 2018 shows hepatic cysts in left lobe, suggest benign etiology. Renal US 2017 and 2020, both unremarkable. On 2019 Ct Pelvis shows umbilical hernia and no prostate abscess. US renal complete 2024 Essentially negative renal ultrasound.        Medical history:  -Hypertension  -Aortic valve stenosis  -Chronic UTI  -A-fib on Xarelto  -Hearing loss sensorineural  -Dizziness and vertigo  -Erectile dysfunction  -GERD  -Idiopathic neuropathy  -Back pain  -Obesity  -Peptic ulcer hemorrhage  -Vitamin D3 def  -Vitamin B12  -Tinnitus   -Anemia as above  -Pancytopenia as above     Social History:  - lives with wife Sole and two children (boy and girl)   -retired  -Daily alcohol intake   -Denies smoking or suing tobacco   -Occasional Marijuana use  -No illicit drug uses      Family History:  -mom  52 years breast cancer  -dad  84 years vascular disease   -Niece leukemia  as a teenager  -Sister with hypertension  -Daughter immune disease  -Brother myocardial infarction      Review of Systems:   System Review-All other systems including Neurologic, Cardiac, Gastrointestinal, Endocrine, Dermatologic, ENT, Respiratory, Infectious, Urologic, Musculoskeletal  have been reviewed and are negative for complaint outside of events noted below and within the assessment.         Allergies and Intolerances:       Allergies   Allergen Reactions    Levofloxacin Angioedema and Hives      Outpatient Medication Profile:  Current Outpatient Medications   Medication Instructions    B complex-vitamin C-folic acid (Nephro-Chi Rx) 1- mg-mg-mcg tablet 1 tablet, oral, Daily with breakfast    carvedilol (COREG) 25 mg, oral, 2 times daily with meals, Take with  food.    cyanocobalamin (VITAMIN B-12) 2,000 mcg, oral, 3 times weekly    folic acid (FOLVITE) 1 mg, oral, Daily    hydrALAZINE (Apresoline) 50 mg tablet TAKE 1 TABLET BY MOUTH THREE TIMES A DAY    hydroCHLOROthiazide (MICROZIDE) 12.5 mg, oral, Daily    levothyroxine (Synthroid, Levoxyl) 88 mcg tablet TAKE 1 TABLET BY MOUTH EVERY DAY ON EMPTY STOMACH    lisinopril 40 mg, oral, Daily    loratadine (CLARITIN) 10 mg, oral, Daily    phenazopyridine (PYRIDIUM) 200 mg, oral, 3 times daily with meals    tamsulosin (FLOMAX) 0.4 mg, oral, Daily    thiamine (VITAMIN B-1) 100 mg, oral, Daily    Xarelto 20 mg, oral, Daily with evening meal      Past Medical History:   Diagnosis Date    Personal history of diseases of the blood and blood-forming organs and certain disorders involving the immune mechanism     History of hemorrhagic diathesis    Personal history of other diseases of the circulatory system     History of hypertension    Personal history of other diseases of the circulatory system 12/22/2022    History of aortic valve stenosis    Personal history of other diseases of the circulatory system 10/10/2021    History of chronic atrial fibrillation    Personal history of other diseases of the musculoskeletal system and connective tissue     History of spinal stenosis    Personal history of other diseases of the musculoskeletal system and connective tissue     History of arthritis    Personal history of other diseases of the nervous system and sense organs     History of sleep apnea      Performance:   ECOG Performance Status: 2- Ambulatory 50% of waking  hours          Vitals and Measurements:       6/26/2023    10:42 AM 10/2/2023    11:41 AM 11/30/2023    10:34 AM 11/30/2023     1:30 PM 2/29/2024    10:14 AM 4/15/2024     2:12 PM 5/2/2024    10:40 AM   Vitals   Systolic 177 153 167 138 138 144 149   Diastolic 90 68 77 82 81 82 72   Heart Rate 70  52 52 70 53 55   Temp  36.2 °C (97.1 °F) 36.5 °C (97.7 °F)  36.2 °C (97.2  "°F) 36.7 °C (98 °F) 36.2 °C (97.2 °F)   Resp   18  18 16 18   Height (in) 1.905 m (6' 3\")   1.905 m (6' 3\") 1.84 m (6' 0.44\")  1.88 m (6' 2\")    Weight (lb) 332  342.71 341.71 341.82 336 336   BMI 41.5 kg/m2  42.84 kg/m2 42.71 kg/m2 45.8 kg/m2 43.14 kg/m2 43.14 kg/m2   BSA (m2) 2.83 m2  2.86 m2 2.86 m2 2.81 m2 2.82 m2 2.82 m2   Visit Report Report  Report    Report Report    Report Report Report Report       Significant value    Multiple values from one day are sorted in reverse-chronological order      Physical Exam:      Constitutional: pallor noted, awake/alert/oriented  x3, no distress, alert and cooperative   Eyes: PERRL, EOMI, clear scleras   ENMT: mucous membranes moist, no apparent injury, no lesions seen   Head/Neck: Neck supple, no apparent injury, thyroid  without mass or tenderness, No JVD, trachea midline, no bruits   Respiratory/Thorax: Patent airways, CTAB, diminished bases bilateral, normal  breath sounds with good chest expansion, thorax symmetric   Cardiovascular: arrhythmia at baseline, no murmurs, swelling in bilateral legs plus one   Gastrointestinal: Obese, soft, non-tender, no rebound tenderness or guarding, unable to palpate liver or spleen   Musculoskeletal: ROM intact, no joint swelling, normal  strength   Extremities: Swelling right ankle, brace, no cyanosis, no contusions or wounds, no clubbing, mild swelling ankles and discoloration, venous insufficiency bilateral lower legs.    Neurological: alert and oriented x3, intact senses,  motor, response and reflexes, normal strength for baseline   Lymphatic: No significant cervical or subclavicular  lymphadenopathy   Psychological: Appropriate mood and behavior. Assessment of alcohol cessation, patient does not want to quit drinking at this time   Skin: Warm and dry, pink cheeks, no lesions, no rashes      Lab Results:    Lab Results   Component Value Date    WBC 4.0 (L) 05/02/2024    NEUTROABS 2.72 05/02/2024    IGABSOL 0.04 05/02/2024    " LYMPHSABS 0.80 05/02/2024    MONOSABS 0.29 05/02/2024    EOSABS 0.16 05/02/2024    BASOSABS 0.03 05/02/2024    RBC 2.49 (L) 05/02/2024     (H) 05/02/2024    MCHC 30.3 (L) 05/02/2024    HGB 9.0 (L) 05/02/2024    HCT 29.7 (L) 05/02/2024    PLT 85 (L) 05/02/2024     Lab Results   Component Value Date    RETICCTPCT 3.3 (H) 02/29/2024      Lab Results   Component Value Date    CREATININE 1.87 (H) 05/02/2024    BUN 36 (H) 05/02/2024    EGFR 37 (L) 05/02/2024     (L) 05/02/2024    K 5.0 05/02/2024     05/02/2024    CO2 18 (L) 05/02/2024      Lab Results   Component Value Date    ALT 14 05/02/2024    AST 22 05/02/2024    ALKPHOS 55 05/02/2024    BILITOT 0.5 05/02/2024      Lab Results   Component Value Date    TSH CANCELED 08/24/2023     Lab Results   Component Value Date    TSH CANCELED 08/24/2023     Lab Results   Component Value Date    IRON 88 05/02/2024    TIBC 288 05/02/2024    FERRITIN 248 05/02/2024      Lab Results   Component Value Date    FRRRJAUW37 1,671 (H) 11/30/2023      Lab Results   Component Value Date    FOLATE >24.0 11/30/2023     Lab Results   Component Value Date    SEDRATE 6 08/24/2023      Lab Results   Component Value Date    CRP CANCELED 08/24/2023        Lab Results   Component Value Date     02/29/2024     Lab Results   Component Value Date    HAPTOGLOBIN 124 11/30/2023     Lab Results   Component Value Date    SPEP Increase in polyclonal gamma globulins.   02/29/2024     Lab Results   Component Value Date    IGG 1,500 02/29/2024     02/29/2024     02/29/2024     Lab Results   Component Value Date    URICACID 8.0 (H) 05/02/2024         Assessment and Plan:   Pleasant 74-year-old male presents for pancytopenia follow-up.  Discussed likely contributors below along with not being able to R/O MDS. Also discussed daily alcohol use and chronic urinary tract infections as playing a role in his pancytopenia.  Pathologist review of CBCd reveal macrocytic anemia.  Supplements of vital vitamin nutrients advised and prescribed.  Discussed the nutritional deficiency, liver disease and alcohol intake as likely causation. His MCV is 119, more deficient from previous value of 107-119 over the last year. WBC 4.8, Hgb 9 and dropping while taking supplements. RBC 2.49 and WBC 4.0, platelets are 85. His daily alcohol consumption is of concern for pancytopenia. He states understanding.  Concern for bleeding, he denies any signs of bleeding.  He is in agreement for bone marrow biopsy.  Iron panel stable.  Ferritin normal range.  Likely anemia of chronic disease versus bleeding, as additional differential.  Following bone marrow biopsy, we may be able to initiate Procrit injections.    Hematuria please follow Urology or PCP.     We discussed alcohol cessation and he is not interested in stopping his drinking at this time. Advised to also follow with PCP for additional assistance. Advised safe cessation and to reach out to a reputable resource.     Resources for ETOH:  [employer] Hotline: 1-929.858.4218   Alcohol Anonymous [facility] www.[email or web address] or 1-966.925.8266     Additional Observations, history, and referrals:  He is wheel chair bound other than pivot and transfer independently, increased weakness and unable to get a weight today. His physical activity is limited by physical pain back and joints. Obesity noted. He has followed inpatient cardiology 09/2021 and 11/2022 for severe aortic stenosis and a-flutter. Follow cardiology yearly for ECHO and assessment. He is on Xarelto and ASA. Followed GI 08/31/2017 for colonoscopy for LLQ pain. Redundant colon noted. No specimens taken. Repeat five years recommended.   Patient could have IBS secondary to alcohol abuse.  Cannot rule out MDS.  Referral to hepatology for evaluation of liver disease following US and previous CT results.       I have reviewed the patient's MHR of all notes, labs, imaging, procedures to assist in evaluation of health.  Some of his health records were obtained outside resources [facility].    Follow-up:  RTC:  -- 5 weeks to review BmBx    Medications:  -Continue supplements as prescribed-new B12, thiamine, folic acid     Imaging:  -BmBx     Referral/Consult:  -IR consult  -PCP as scheduled/needed if persistent illness persists, address uric acid levels, and Hematuria     Discussed the results of laboratory work-up in detail.  Patient states understanding and agreeable to plan of care.  Patient will call with any questions or concerns.          Patient Instructions:      Instructions Type: Healthy lifestyle and nutrition and activity, alcohol cessation.     Thank you allowing me to care for you today.    Sincerely,  Vickie Srivastava, APRN-CNP     This note has been written with voice recognition software if there is need for clarity please reach out.

## 2024-05-02 NOTE — LETTER
May 2, 2024     Luis Eduardo Kay DO  [id number] [address]  [facility] [address]    Patient: Pamela Cancino   YOB: 1950   Date of Visit: 5/2/2024       Dear Dr. Luis Eduardo Kay DO:    Below are my notes for this visit.  Anemia and overall pancytopenia increasing.  Patient finally agreeable for bone marrow biopsy.  Patient does note some hematuria, though unsure if it is from straight cath.  Hemoglobin decreasing, concern for bleeding versus chronic disease versus ETOH versus bone marrow dysplasia. Uric acid levels elevated.   If you have questions, please do not hesitate to call me. I look forward to following your patient along with you.       Sincerely,     Vickie Srivastava, APRN-CNP      CC: MD Howard Muse MD  ______________________________________________________________________________________    Patient Visit Information:   Visit Type: Follow Up Visit      History of Present Illness:   Hematology History:  Patient is a 74-year-old male that presents with his wife, Sole, for follow up of pancytopenia. Patient presents in a wheelchair.  Outside lab records indicate pancytopenia ongoing since 2014.  Wife states he has been hospitalized in 2017 for over 5 months for osteomyelitis and septicemia.  He has chronic urinary tract infections. Last imaging CT chest abdomen and pelvis in 2022 reveal CAD, atherosclerosis, calcified aortic valve, pulmonary hypertension, dilated pulmonary artery and hernia.    Head CT in 2021 revealed maxillary sinus thickening but no acute issues.   It was also noted in 2017 that he had internal bleeding and a colonoscopy and EGD was performed in which the patient states has resolved. Platelet and blood transfusions administered.    ID Statement:    PAMELA CANCINO is a 74 year old Male        Chief Complaint: Follow up for pancytopenia   Interval History:      He presents today in  with wife, Sole.     Decreased appetite for a few days, no weight  loss. No bleeding or bruising from any location. Sinuses closed up a week ago. On Claritin now and congestion better.  Hematuria in urine though is straight cathing, unsure of cause.   Weakness and lethargic over 3 weeks.     He denies any headache, lymphadenopathy, fever, night sweats, shortness of breath, chest pain or palpitations, abdominal pain, constipation, diarrhea, nausea, vomiting, bloody stool or dark stool. Urinary issues are chronic and include dysuria, frequency and urgency.  Recently saw Nephrology who reports kidney function stable CKD. Nocturia continues. He has sleep apnea and is on a CPAP.  Patient denies any issues with appetite, or weight loss. Continues daily alcohol intake of 4 double bourbon despite advise for cessation.     Imaging Studies:  US abdomen on 2023 shows calculi vs sludge in gallbladder, hepatomegaly with similar appearance of hepatic cyst in left lobe of liver. Previous CT abdomen 2014 shows fatty liver, cyst in left lobe, portal HTN, likely cirrhosis of the liver. Borderline splenomegaly. Repeat CT abdomen 2018 shows hepatic cysts in left lobe, suggest benign etiology. Renal US 2017 and 2020, both unremarkable. On 2019 Ct Pelvis shows umbilical hernia and no prostate abscess. US renal complete 2024 Essentially negative renal ultrasound.        Medical history:  -Hypertension  -Aortic valve stenosis  -Chronic UTI  -A-fib on Xarelto  -Hearing loss sensorineural  -Dizziness and vertigo  -Erectile dysfunction  -GERD  -Idiopathic neuropathy  -Back pain  -Obesity  -Peptic ulcer hemorrhage  -Vitamin D3 def  -Vitamin B12  -Tinnitus   -Anemia as above  -Pancytopenia as above     Social History:  - lives with wife Sole and two children (boy and girl)   -retired  -Daily alcohol intake   -Denies smoking or suing tobacco   -Occasional Marijuana use  -No illicit drug uses      Family History:  -mom  52 years breast cancer  -dad   84 years vascular disease   -Niece leukemia  as a teenager  -Sister with hypertension  -Daughter immune disease  -Brother myocardial infarction      Review of Systems:   System Review-All other systems including Neurologic, Cardiac, Gastrointestinal, Endocrine, Dermatologic, ENT, Respiratory, Infectious, Urologic, Musculoskeletal  have been reviewed and are negative for complaint outside of events noted below and within the assessment.         Allergies and Intolerances:       Allergies   Allergen Reactions   • Levofloxacin Angioedema and Hives      Outpatient Medication Profile:  Current Outpatient Medications   Medication Instructions   • B complex-vitamin C-folic acid (Nephro-Chi Rx) 1- mg-mg-mcg tablet 1 tablet, oral, Daily with breakfast   • carvedilol (COREG) 25 mg, oral, 2 times daily with meals, Take with food.   • cyanocobalamin (VITAMIN B-12) 2,000 mcg, oral, 3 times weekly   • folic acid (FOLVITE) 1 mg, oral, Daily   • hydrALAZINE (Apresoline) 50 mg tablet TAKE 1 TABLET BY MOUTH THREE TIMES A DAY   • hydroCHLOROthiazide (MICROZIDE) 12.5 mg, oral, Daily   • levothyroxine (Synthroid, Levoxyl) 88 mcg tablet TAKE 1 TABLET BY MOUTH EVERY DAY ON EMPTY STOMACH   • lisinopril 40 mg, oral, Daily   • loratadine (CLARITIN) 10 mg, oral, Daily   • phenazopyridine (PYRIDIUM) 200 mg, oral, 3 times daily with meals   • tamsulosin (FLOMAX) 0.4 mg, oral, Daily   • thiamine (VITAMIN B-1) 100 mg, oral, Daily   • Xarelto 20 mg, oral, Daily with evening meal      Past Medical History:   Diagnosis Date   • Personal history of diseases of the blood and blood-forming organs and certain disorders involving the immune mechanism     History of hemorrhagic diathesis   • Personal history of other diseases of the circulatory system     History of hypertension   • Personal history of other diseases of the circulatory system 2022    History of aortic valve stenosis   • Personal history of other diseases of  "the circulatory system 10/10/2021    History of chronic atrial fibrillation   • Personal history of other diseases of the musculoskeletal system and connective tissue     History of spinal stenosis   • Personal history of other diseases of the musculoskeletal system and connective tissue     History of arthritis   • Personal history of other diseases of the nervous system and sense organs     History of sleep apnea      Performance:   ECOG Performance Status: 2- Ambulatory 50% of waking  hours          Vitals and Measurements:       6/26/2023    10:42 AM 10/2/2023    11:41 AM 11/30/2023    10:34 AM 11/30/2023     1:30 PM 2/29/2024    10:14 AM 4/15/2024     2:12 PM 5/2/2024    10:40 AM   Vitals   Systolic 177 153 167 138 138 144 149   Diastolic 90 68 77 82 81 82 72   Heart Rate 70  52 52 70 53 55   Temp  36.2 °C (97.1 °F) 36.5 °C (97.7 °F)  36.2 °C (97.2 °F) 36.7 °C (98 °F) 36.2 °C (97.2 °F)   Resp   18  18 16 18   Height (in) 1.905 m (6' 3\")   1.905 m (6' 3\") 1.84 m (6' 0.44\")  1.88 m (6' 2\")    Weight (lb) 332  342.71 341.71 341.82 336 336   BMI 41.5 kg/m2  42.84 kg/m2 42.71 kg/m2 45.8 kg/m2 43.14 kg/m2 43.14 kg/m2   BSA (m2) 2.83 m2  2.86 m2 2.86 m2 2.81 m2 2.82 m2 2.82 m2   Visit Report Report  Report    Report Report    Report Report Report Report       Significant value    Multiple values from one day are sorted in reverse-chronological order      Physical Exam:      Constitutional: pallor noted, awake/alert/oriented  x3, no distress, alert and cooperative   Eyes: PERRL, EOMI, clear scleras   ENMT: mucous membranes moist, no apparent injury, no lesions seen   Head/Neck: Neck supple, no apparent injury, thyroid  without mass or tenderness, No JVD, trachea midline, no bruits   Respiratory/Thorax: Patent airways, CTAB, diminished bases bilateral, normal  breath sounds with good chest expansion, thorax symmetric   Cardiovascular: arrhythmia at baseline, no murmurs, swelling in bilateral legs plus one "   Gastrointestinal: Obese, soft, non-tender, no rebound tenderness or guarding, unable to palpate liver or spleen   Musculoskeletal: ROM intact, no joint swelling, normal  strength   Extremities: Swelling right ankle, brace, no cyanosis, no contusions or wounds, no clubbing, mild swelling ankles and discoloration, venous insufficiency bilateral lower legs.    Neurological: alert and oriented x3, intact senses,  motor, response and reflexes, normal strength for baseline   Lymphatic: No significant cervical or subclavicular  lymphadenopathy   Psychological: Appropriate mood and behavior. Assessment of alcohol cessation, patient does not want to quit drinking at this time   Skin: Warm and dry, pink cheeks, no lesions, no rashes      Lab Results:    Lab Results   Component Value Date    WBC 4.0 (L) 05/02/2024    NEUTROABS 2.72 05/02/2024    IGABSOL 0.04 05/02/2024    LYMPHSABS 0.80 05/02/2024    MONOSABS 0.29 05/02/2024    EOSABS 0.16 05/02/2024    BASOSABS 0.03 05/02/2024    RBC 2.49 (L) 05/02/2024     (H) 05/02/2024    MCHC 30.3 (L) 05/02/2024    HGB 9.0 (L) 05/02/2024    HCT 29.7 (L) 05/02/2024    PLT 85 (L) 05/02/2024     Lab Results   Component Value Date    RETICCTPCT 3.3 (H) 02/29/2024      Lab Results   Component Value Date    CREATININE 1.87 (H) 05/02/2024    BUN 36 (H) 05/02/2024    EGFR 37 (L) 05/02/2024     (L) 05/02/2024    K 5.0 05/02/2024     05/02/2024    CO2 18 (L) 05/02/2024      Lab Results   Component Value Date    ALT 14 05/02/2024    AST 22 05/02/2024    ALKPHOS 55 05/02/2024    BILITOT 0.5 05/02/2024      Lab Results   Component Value Date    TSH CANCELED 08/24/2023     Lab Results   Component Value Date    TSH CANCELED 08/24/2023     Lab Results   Component Value Date    IRON 88 05/02/2024    TIBC 288 05/02/2024    FERRITIN 248 05/02/2024      Lab Results   Component Value Date    QHRJBRCL07 1,671 (H) 11/30/2023      Lab Results   Component Value Date    FOLATE >24.0 11/30/2023      Lab Results   Component Value Date    SEDRATE 6 08/24/2023      Lab Results   Component Value Date    CRP CANCELED 08/24/2023        Lab Results   Component Value Date     02/29/2024     Lab Results   Component Value Date    HAPTOGLOBIN 124 11/30/2023     Lab Results   Component Value Date    SPEP Increase in polyclonal gamma globulins.   02/29/2024     Lab Results   Component Value Date    IGG 1,500 02/29/2024     02/29/2024     02/29/2024     Lab Results   Component Value Date    URICACID 8.0 (H) 05/02/2024         Assessment and Plan:   Pleasant 74-year-old male presents for pancytopenia follow-up.  Discussed likely contributors below along with not being able to R/O MDS. Also discussed daily alcohol use and chronic urinary tract infections as playing a role in his pancytopenia.  Pathologist review of CBCd reveal macrocytic anemia. Supplements of vital vitamin nutrients advised and prescribed.  Discussed the nutritional deficiency, liver disease and alcohol intake as likely causation. His MCV is 119, more deficient from previous value of 107-119 over the last year. WBC 4.8, Hgb 9 and dropping while taking supplements. RBC 2.49 and WBC 4.0, platelets are 85. His daily alcohol consumption is of concern for pancytopenia. He states understanding.  Concern for bleeding, he denies any signs of bleeding.  He is in agreement for bone marrow biopsy.  Iron panel stable.  Ferritin normal range.  Likely anemia of chronic disease versus bleeding, as additional differential.  Following bone marrow biopsy, we may be able to initiate Procrit injections.    Hematuria please follow Urology or PCP.     We discussed alcohol cessation and he is not interested in stopping his drinking at this time. Advised to also follow with PCP for additional assistance. Advised safe cessation and to reach out to a reputable resource.     Resources for ETOH:  [place] Hotline: 1-258.721.4553   Al[facility]  www.aacle.org or 7-374-992-5332     Additional Observations, history, and referrals:  He is wheel chair bound other than pivot and transfer independently, increased weakness and unable to get a weight today. His physical activity is limited by physical pain back and joints. Obesity noted. He has followed inpatient cardiology 09/2021 and 11/2022 for severe aortic stenosis and a-flutter. Follow cardiology yearly for ECHO and assessment. He is on Xarelto and ASA. Followed GI 08/31/2017 for colonoscopy for LLQ pain. Redundant colon noted. No specimens taken. Repeat five years recommended.   Patient could have IBS secondary to alcohol abuse.  Cannot rule out MDS.  Referral to hepatology for evaluation of liver disease following US and previous CT results.       I have reviewed the patient's MHR of all notes, labs, imaging, procedures to assist in evaluation of health. Some of his health records were obtained outside resources [facility].    Follow-up:  RTC:  -- 5 weeks to review BmBx    Medications:  -Continue supplements as prescribed-new B12, thiamine, folic acid     Imaging:  -BmBx     Referral/Consult:  -IR consult  -PCP as scheduled/needed if persistent illness persists, address uric acid levels, and Hematuria     Discussed the results of laboratory work-up in detail.  Patient states understanding and agreeable to plan of care.  Patient will call with any questions or concerns.          Patient Instructions:      Instructions Type: Healthy lifestyle and nutrition and activity, alcohol cessation.     Thank you allowing me to care for you today.    Sincerely,  Vickie Srivastava, APRN-CNP     This note has been written with voice recognition software if there is need for clarity please reach out.

## 2024-05-03 DIAGNOSIS — I10 PRIMARY HYPERTENSION: ICD-10-CM

## 2024-05-03 LAB
KAPPA LC SERPL-MCNC: 5.28 MG/DL (ref 0.33–1.94)
KAPPA LC/LAMBDA SER: 1.47 {RATIO} (ref 0.26–1.65)
LAMBDA LC SERPL-MCNC: 3.58 MG/DL (ref 0.57–2.63)

## 2024-05-03 RX ORDER — HYDROCHLOROTHIAZIDE 12.5 MG/1
12.5 TABLET ORAL DAILY
Qty: 90 TABLET | Refills: 0 | Status: SHIPPED | OUTPATIENT
Start: 2024-05-03

## 2024-05-04 DIAGNOSIS — M10.00 IDIOPATHIC GOUT, UNSPECIFIED CHRONICITY, UNSPECIFIED SITE: Primary | ICD-10-CM

## 2024-05-04 RX ORDER — PREDNISONE 10 MG/1
TABLET ORAL 3 TIMES DAILY
Qty: 6 TABLET | Refills: 3 | Status: SHIPPED | OUTPATIENT
Start: 2024-05-04 | End: 2024-05-07

## 2024-05-04 RX ORDER — ALLOPURINOL 100 MG/1
100 TABLET ORAL DAILY
Qty: 30 TABLET | Refills: 11 | Status: SHIPPED | OUTPATIENT
Start: 2024-05-04 | End: 2025-05-04

## 2024-05-07 ENCOUNTER — TELEPHONE (OUTPATIENT)
Dept: SURGERY | Facility: CLINIC | Age: 74
End: 2024-05-07
Payer: MEDICARE

## 2024-05-07 NOTE — TELEPHONE ENCOUNTER
----- Message from Milli Mott MD sent at 5/4/2024 11:35 AM EDT -----  Please call  Kidney function is relatively stable  Uric acid significantly elevated-I will put order for uric acid lowering therapy (Uloric or allopurinol due to insurance).  Reports sent by orders for steroids in case of gout flares.  Will follow-up as planned  Dr. Tiera HAND    Spoke with pt. Spouse, Sole, relayed info. Pt. Already started prednisone to help with  issues in his foot. They expressed understanding.  MO

## 2024-05-09 NOTE — PROGRESS NOTES
Left voicemail to discuss labs.______________________________________________CASSIE Srivatsava CNP

## 2024-05-29 ENCOUNTER — HOSPITAL ENCOUNTER (OUTPATIENT)
Dept: RADIOLOGY | Facility: HOSPITAL | Age: 74
Discharge: HOME | End: 2024-05-29
Payer: MEDICARE

## 2024-05-29 VITALS
OXYGEN SATURATION: 92 % | SYSTOLIC BLOOD PRESSURE: 154 MMHG | HEART RATE: 70 BPM | RESPIRATION RATE: 18 BRPM | DIASTOLIC BLOOD PRESSURE: 79 MMHG

## 2024-05-29 DIAGNOSIS — N18.32 CHRONIC KIDNEY DISEASE, STAGE 3B (MULTI): ICD-10-CM

## 2024-05-29 DIAGNOSIS — N28.9 KIDNEY DISEASE: ICD-10-CM

## 2024-05-29 DIAGNOSIS — D61.818 PANCYTOPENIA (MULTI): ICD-10-CM

## 2024-05-29 DIAGNOSIS — M10.00 IDIOPATHIC GOUT, UNSPECIFIED CHRONICITY, UNSPECIFIED SITE: ICD-10-CM

## 2024-05-29 DIAGNOSIS — D50.9 IRON DEFICIENCY ANEMIA, UNSPECIFIED IRON DEFICIENCY ANEMIA TYPE: ICD-10-CM

## 2024-05-29 DIAGNOSIS — I12.9 HYPERTENSIVE CHRONIC KIDNEY DISEASE WITH STAGE 1 THROUGH STAGE 4 CHRONIC KIDNEY DISEASE, OR UNSPECIFIED CHRONIC KIDNEY DISEASE: ICD-10-CM

## 2024-05-29 LAB
BASOPHILS # BLD AUTO: 0.04 X10*3/UL (ref 0–0.1)
BASOPHILS NFR BLD AUTO: 0.9 %
EOSINOPHIL # BLD AUTO: 0.1 X10*3/UL (ref 0–0.4)
EOSINOPHIL NFR BLD AUTO: 2.3 %
ERYTHROCYTE [DISTWIDTH] IN BLOOD BY AUTOMATED COUNT: 14.2 % (ref 11.5–14.5)
HCT VFR BLD AUTO: 34.3 % (ref 41–52)
HGB BLD-MCNC: 10.5 G/DL (ref 13.5–17.5)
IMM GRANULOCYTES # BLD AUTO: 0.02 X10*3/UL (ref 0–0.5)
IMM GRANULOCYTES NFR BLD AUTO: 0.5 % (ref 0–0.9)
LYMPHOCYTES # BLD AUTO: 0.72 X10*3/UL (ref 0.8–3)
LYMPHOCYTES NFR BLD AUTO: 16.4 %
MCH RBC QN AUTO: 35.4 PG (ref 26–34)
MCHC RBC AUTO-ENTMCNC: 30.6 G/DL (ref 32–36)
MCV RBC AUTO: 116 FL (ref 80–100)
MONOCYTES # BLD AUTO: 0.32 X10*3/UL (ref 0.05–0.8)
MONOCYTES NFR BLD AUTO: 7.3 %
NEUTROPHILS # BLD AUTO: 3.2 X10*3/UL (ref 1.6–5.5)
NEUTROPHILS NFR BLD AUTO: 72.6 %
NRBC BLD-RTO: 0 /100 WBCS (ref 0–0)
PLATELET # BLD AUTO: 121 X10*3/UL (ref 150–450)
RBC # BLD AUTO: 2.97 X10*6/UL (ref 4.5–5.9)
WBC # BLD AUTO: 4.4 X10*3/UL (ref 4.4–11.3)

## 2024-05-29 PROCEDURE — 77012 CT SCAN FOR NEEDLE BIOPSY: CPT

## 2024-05-29 PROCEDURE — C1830 POWER BONE MARROW BX NEEDLE: HCPCS

## 2024-05-29 PROCEDURE — 2720000007 HC OR 272 NO HCPCS

## 2024-05-29 PROCEDURE — 99152 MOD SED SAME PHYS/QHP 5/>YRS: CPT | Performed by: RADIOLOGY

## 2024-05-29 PROCEDURE — 38222 DX BONE MARROW BX & ASPIR: CPT | Performed by: RADIOLOGY

## 2024-05-29 PROCEDURE — 85025 COMPLETE CBC W/AUTO DIFF WBC: CPT

## 2024-05-29 PROCEDURE — 85097 BONE MARROW INTERPRETATION: CPT | Mod: TC,GEALAB

## 2024-05-29 PROCEDURE — 2500000004 HC RX 250 GENERAL PHARMACY W/ HCPCS (ALT 636 FOR OP/ED): Performed by: RADIOLOGY

## 2024-05-29 PROCEDURE — 7100000009 HC PHASE TWO TIME - INITIAL BASE CHARGE

## 2024-05-29 PROCEDURE — 81450 HL NEO GSAP 5-50DNA/DNA&RNA: CPT

## 2024-05-29 PROCEDURE — 77012 CT SCAN FOR NEEDLE BIOPSY: CPT | Performed by: RADIOLOGY

## 2024-05-29 PROCEDURE — 7100000010 HC PHASE TWO TIME - EACH INCREMENTAL 1 MINUTE

## 2024-05-29 PROCEDURE — 99152 MOD SED SAME PHYS/QHP 5/>YRS: CPT

## 2024-05-29 RX ORDER — FENTANYL CITRATE 50 UG/ML
INJECTION, SOLUTION INTRAMUSCULAR; INTRAVENOUS
Status: DISCONTINUED
Start: 2024-05-29 | End: 2024-05-30 | Stop reason: HOSPADM

## 2024-05-29 RX ORDER — FENTANYL CITRATE 50 UG/ML
INJECTION, SOLUTION INTRAMUSCULAR; INTRAVENOUS
Status: COMPLETED | OUTPATIENT
Start: 2024-05-29 | End: 2024-05-29

## 2024-05-29 RX ORDER — MIDAZOLAM HYDROCHLORIDE 1 MG/ML
INJECTION INTRAMUSCULAR; INTRAVENOUS
Status: COMPLETED | OUTPATIENT
Start: 2024-05-29 | End: 2024-05-29

## 2024-05-29 RX ORDER — MIDAZOLAM HYDROCHLORIDE 1 MG/ML
INJECTION, SOLUTION INTRAMUSCULAR; INTRAVENOUS
Status: DISCONTINUED
Start: 2024-05-29 | End: 2024-05-30 | Stop reason: HOSPADM

## 2024-05-29 RX ADMIN — MIDAZOLAM HYDROCHLORIDE 2 MG: 1 INJECTION, SOLUTION INTRAMUSCULAR; INTRAVENOUS at 13:20

## 2024-05-29 RX ADMIN — FENTANYL CITRATE 100 MCG: 50 INJECTION, SOLUTION INTRAMUSCULAR; INTRAVENOUS at 13:19

## 2024-05-29 ASSESSMENT — PAIN SCALES - GENERAL
PAINLEVEL_OUTOF10: 0 - NO PAIN

## 2024-05-29 NOTE — DISCHARGE INSTRUCTIONS
Follow up with ordering physician  Report any signs of infection to your physician  My Shower tomorrow  No driving today

## 2024-06-02 ENCOUNTER — PATIENT MESSAGE (OUTPATIENT)
Dept: HEMATOLOGY/ONCOLOGY | Facility: CLINIC | Age: 74
End: 2024-06-02
Payer: MEDICARE

## 2024-06-02 DIAGNOSIS — N18.32 CHRONIC KIDNEY DISEASE, STAGE 3B (MULTI): ICD-10-CM

## 2024-06-02 DIAGNOSIS — D50.9 IRON DEFICIENCY ANEMIA, UNSPECIFIED IRON DEFICIENCY ANEMIA TYPE: ICD-10-CM

## 2024-06-02 DIAGNOSIS — D61.818 PANCYTOPENIA (MULTI): Primary | ICD-10-CM

## 2024-06-03 LAB
CELL COUNT (BLOOD): 22.61 X10*3/UL
CELL POPULATIONS: NORMAL
DIAGNOSIS: NORMAL
FLOW DIFFERENTIAL: NORMAL
FLOW TEST ORDERED: NORMAL
LAB TEST METHOD: NORMAL
NUMBER OF CELLS COLLECTED: NORMAL
PATH REPORT.TOTAL CANCER: NORMAL
SIGNATURE COMMENT: NORMAL
SPECIMEN VIABILITY: NORMAL

## 2024-06-03 PROCEDURE — 88189 FLOWCYTOMETRY/READ 16 & >: CPT

## 2024-06-03 PROCEDURE — 88185 FLOWCYTOMETRY/TC ADD-ON: CPT | Mod: TC,91,GEALAB

## 2024-06-05 NOTE — PROGRESS NOTES
Per CASSIE Srivastava request, called and spoke to Sole and told CASSIE Srivastava CNP will be calling tomorrow to check in after 3:30 pm.  She was happy to hear that.

## 2024-06-05 NOTE — PROGRESS NOTES
Called and spoke to Sole.  She states the pt is refusing to go to another provider at this time and wants to wait until he sees CASSIE Srivastava CNP.  She states he is doing a bit better, used some Hydrocortisone which is helpful to him.  Told if pt begins to go down, to please let us know and we can follow up from there.  Sole agreed to this plan.

## 2024-06-06 DIAGNOSIS — R39.9 URINARY SYMPTOM OR SIGN: Primary | ICD-10-CM

## 2024-06-06 LAB
ELECTRONICALLY SIGNED BY: NORMAL
MYELOID NGS RESULTS: NORMAL
PATH REPORT.COMMENTS IMP SPEC: NORMAL
PATH REPORT.FINAL DX SPEC: NORMAL
PATH REPORT.GROSS SPEC: NORMAL
PATH REPORT.MICROSCOPIC SPEC OTHER STN: NORMAL
PATH REPORT.RELEVANT HX SPEC: NORMAL
PATH REPORT.TOTAL CANCER: NORMAL

## 2024-06-06 PROCEDURE — 88305 TISSUE EXAM BY PATHOLOGIST: CPT | Mod: TC,59,GEALAB

## 2024-06-06 PROCEDURE — 88305 TISSUE EXAM BY PATHOLOGIST: CPT | Performed by: PATHOLOGY

## 2024-06-06 PROCEDURE — 88365 INSITU HYBRIDIZATION (FISH): CPT | Performed by: PATHOLOGY

## 2024-06-06 PROCEDURE — 88313 SPECIAL STAINS GROUP 2: CPT | Performed by: PATHOLOGY

## 2024-06-06 PROCEDURE — 88311 DECALCIFY TISSUE: CPT | Performed by: PATHOLOGY

## 2024-06-06 PROCEDURE — 88341 IMHCHEM/IMCYTCHM EA ADD ANTB: CPT | Performed by: PATHOLOGY

## 2024-06-06 PROCEDURE — 88342 IMHCHEM/IMCYTCHM 1ST ANTB: CPT | Performed by: PATHOLOGY

## 2024-06-07 ENCOUNTER — LAB (OUTPATIENT)
Dept: LAB | Facility: LAB | Age: 74
End: 2024-06-07
Payer: MEDICARE

## 2024-06-07 DIAGNOSIS — I12.9 HYPERTENSIVE CHRONIC KIDNEY DISEASE WITH STAGE 1 THROUGH STAGE 4 CHRONIC KIDNEY DISEASE, OR UNSPECIFIED CHRONIC KIDNEY DISEASE: ICD-10-CM

## 2024-06-07 DIAGNOSIS — R39.9 URINARY SYMPTOM OR SIGN: ICD-10-CM

## 2024-06-07 DIAGNOSIS — N28.9 KIDNEY DISEASE: ICD-10-CM

## 2024-06-07 DIAGNOSIS — M10.00 IDIOPATHIC GOUT, UNSPECIFIED CHRONICITY, UNSPECIFIED SITE: ICD-10-CM

## 2024-06-07 DIAGNOSIS — N18.32 CHRONIC KIDNEY DISEASE, STAGE 3B (MULTI): ICD-10-CM

## 2024-06-07 LAB
CREAT UR-MCNC: 38.7 MG/DL (ref 20–370)
MICROALBUMIN UR-MCNC: 57.2 MG/L
MICROALBUMIN/CREAT UR: 147.8 UG/MG CREAT

## 2024-06-07 PROCEDURE — 82570 ASSAY OF URINE CREATININE: CPT

## 2024-06-07 PROCEDURE — 87086 URINE CULTURE/COLONY COUNT: CPT

## 2024-06-07 PROCEDURE — 82043 UR ALBUMIN QUANTITATIVE: CPT

## 2024-06-09 ENCOUNTER — TELEPHONE (OUTPATIENT)
Dept: HEMATOLOGY/ONCOLOGY | Facility: HOSPITAL | Age: 74
End: 2024-06-09
Payer: MEDICARE

## 2024-06-09 DIAGNOSIS — I10 ESSENTIAL (PRIMARY) HYPERTENSION: Primary | ICD-10-CM

## 2024-06-09 LAB — BACTERIA UR CULT: ABNORMAL

## 2024-06-10 ENCOUNTER — LAB (OUTPATIENT)
Dept: LAB | Facility: HOSPITAL | Age: 74
End: 2024-06-10
Payer: MEDICARE

## 2024-06-10 ENCOUNTER — DOCUMENTATION (OUTPATIENT)
Dept: HEMATOLOGY/ONCOLOGY | Facility: CLINIC | Age: 74
End: 2024-06-10
Payer: MEDICARE

## 2024-06-10 DIAGNOSIS — N28.9 KIDNEY DISEASE: ICD-10-CM

## 2024-06-10 DIAGNOSIS — N28.9 KIDNEY DISEASE: Primary | ICD-10-CM

## 2024-06-10 DIAGNOSIS — R39.9 URINARY SYMPTOM OR SIGN: ICD-10-CM

## 2024-06-10 LAB
APPEARANCE UR: ABNORMAL
BACTERIA #/AREA URNS AUTO: ABNORMAL /HPF
BILIRUB UR STRIP.AUTO-MCNC: NEGATIVE MG/DL
COLOR UR: ABNORMAL
GLUCOSE UR STRIP.AUTO-MCNC: NORMAL MG/DL
KETONES UR STRIP.AUTO-MCNC: NEGATIVE MG/DL
LEUKOCYTE ESTERASE UR QL STRIP.AUTO: ABNORMAL
MUCOUS THREADS #/AREA URNS AUTO: ABNORMAL /LPF
NITRITE UR QL STRIP.AUTO: ABNORMAL
PH UR STRIP.AUTO: 5.5 [PH]
PROT UR STRIP.AUTO-MCNC: ABNORMAL MG/DL
RBC # UR STRIP.AUTO: ABNORMAL /UL
RBC #/AREA URNS AUTO: >20 /HPF
SP GR UR STRIP.AUTO: 1.01
TRANS CELLS #/AREA UR COMP ASSIST: ABNORMAL /HPF
UROBILINOGEN UR STRIP.AUTO-MCNC: NORMAL MG/DL
WBC #/AREA URNS AUTO: >50 /HPF
WBC CLUMPS #/AREA URNS AUTO: ABNORMAL /HPF

## 2024-06-10 PROCEDURE — 81001 URINALYSIS AUTO W/SCOPE: CPT

## 2024-06-10 PROCEDURE — 81003 URINALYSIS AUTO W/O SCOPE: CPT

## 2024-06-10 NOTE — PROGRESS NOTES
Called and spoke to pt's wife.  She states pt often comes up with mixed bradley on his urine tests.  Informed CASSIE Srivastava CNP and she would still like pt to come in for UA.  Sole called back and she will bring pt in for exam today. CASSIE Srivastava informed.

## 2024-06-11 RX ORDER — AMOXICILLIN AND CLAVULANATE POTASSIUM 500; 125 MG/1; MG/1
1 TABLET, FILM COATED ORAL 2 TIMES DAILY
Qty: 20 TABLET | Refills: 0 | Status: SHIPPED | OUTPATIENT
Start: 2024-06-11 | End: 2024-06-21

## 2024-06-13 ENCOUNTER — OFFICE VISIT (OUTPATIENT)
Dept: HEMATOLOGY/ONCOLOGY | Facility: CLINIC | Age: 74
End: 2024-06-13
Payer: MEDICARE

## 2024-06-13 VITALS
SYSTOLIC BLOOD PRESSURE: 195 MMHG | OXYGEN SATURATION: 95 % | RESPIRATION RATE: 16 BRPM | HEART RATE: 59 BPM | BODY MASS INDEX: 45.8 KG/M2 | WEIGHT: 315 LBS | DIASTOLIC BLOOD PRESSURE: 78 MMHG | TEMPERATURE: 97.9 F

## 2024-06-13 DIAGNOSIS — D63.1 ANEMIA DUE TO STAGE 4 CHRONIC KIDNEY DISEASE (MULTI): Primary | ICD-10-CM

## 2024-06-13 DIAGNOSIS — N28.9 KIDNEY DISEASE: ICD-10-CM

## 2024-06-13 DIAGNOSIS — D50.9 IRON DEFICIENCY ANEMIA, UNSPECIFIED IRON DEFICIENCY ANEMIA TYPE: ICD-10-CM

## 2024-06-13 DIAGNOSIS — D61.818 PANCYTOPENIA (MULTI): ICD-10-CM

## 2024-06-13 DIAGNOSIS — I12.9 HYPERTENSIVE CHRONIC KIDNEY DISEASE WITH STAGE 1 THROUGH STAGE 4 CHRONIC KIDNEY DISEASE, OR UNSPECIFIED CHRONIC KIDNEY DISEASE: ICD-10-CM

## 2024-06-13 DIAGNOSIS — N18.32 CHRONIC KIDNEY DISEASE, STAGE 3B (MULTI): ICD-10-CM

## 2024-06-13 DIAGNOSIS — N18.4 ANEMIA DUE TO STAGE 4 CHRONIC KIDNEY DISEASE (MULTI): Primary | ICD-10-CM

## 2024-06-13 DIAGNOSIS — M10.00 IDIOPATHIC GOUT, UNSPECIFIED CHRONICITY, UNSPECIFIED SITE: ICD-10-CM

## 2024-06-13 DIAGNOSIS — I10 ESSENTIAL (PRIMARY) HYPERTENSION: ICD-10-CM

## 2024-06-13 PROCEDURE — 4010F ACE/ARB THERAPY RXD/TAKEN: CPT

## 2024-06-13 PROCEDURE — 1160F RVW MEDS BY RX/DR IN RCRD: CPT

## 2024-06-13 PROCEDURE — 1159F MED LIST DOCD IN RCRD: CPT

## 2024-06-13 PROCEDURE — 3060F POS MICROALBUMINURIA REV: CPT

## 2024-06-13 PROCEDURE — 3077F SYST BP >= 140 MM HG: CPT

## 2024-06-13 PROCEDURE — 1157F ADVNC CARE PLAN IN RCRD: CPT

## 2024-06-13 PROCEDURE — 99214 OFFICE O/P EST MOD 30 MIN: CPT

## 2024-06-13 PROCEDURE — 1126F AMNT PAIN NOTED NONE PRSNT: CPT

## 2024-06-13 PROCEDURE — 3078F DIAST BP <80 MM HG: CPT

## 2024-06-13 RX ORDER — DIPHENHYDRAMINE HYDROCHLORIDE 50 MG/ML
50 INJECTION INTRAMUSCULAR; INTRAVENOUS AS NEEDED
OUTPATIENT
Start: 2024-06-20

## 2024-06-13 RX ORDER — HYDRALAZINE HYDROCHLORIDE 50 MG/1
50 TABLET, FILM COATED ORAL 2 TIMES DAILY
Qty: 270 TABLET | Refills: 0 | Status: SHIPPED | OUTPATIENT
Start: 2024-06-13

## 2024-06-13 RX ORDER — FAMOTIDINE 10 MG/ML
20 INJECTION INTRAVENOUS ONCE AS NEEDED
OUTPATIENT
Start: 2024-06-20

## 2024-06-13 RX ORDER — EPINEPHRINE 0.3 MG/.3ML
0.3 INJECTION SUBCUTANEOUS EVERY 5 MIN PRN
OUTPATIENT
Start: 2024-06-20

## 2024-06-13 RX ORDER — HEPARIN SODIUM,PORCINE/PF 10 UNIT/ML
50 SYRINGE (ML) INTRAVENOUS AS NEEDED
OUTPATIENT
Start: 2024-06-20

## 2024-06-13 RX ORDER — HEPARIN 100 UNIT/ML
500 SYRINGE INTRAVENOUS AS NEEDED
OUTPATIENT
Start: 2024-06-20

## 2024-06-13 RX ORDER — ALBUTEROL SULFATE 0.83 MG/ML
3 SOLUTION RESPIRATORY (INHALATION) AS NEEDED
OUTPATIENT
Start: 2024-06-20

## 2024-06-13 ASSESSMENT — PAIN SCALES - GENERAL: PAINLEVEL: 0-NO PAIN

## 2024-06-13 NOTE — PROGRESS NOTES
Patient Visit Information:   Visit Type: Follow Up Visit      History of Present Illness:   Hematology History:  Patient is a 74-year-old male that presents with his wife, Sole, for follow up of pancytopenia. Patient presents in a wheelchair.  Outside lab records indicate pancytopenia ongoing since 2014.  Wife states he has been hospitalized in 2017 for over 5 months for osteomyelitis and septicemia.  He has chronic urinary tract infections. Last imaging CT chest abdomen and pelvis in 2022 reveal CAD, atherosclerosis, calcified aortic valve, pulmonary hypertension, dilated pulmonary artery and hernia.    Head CT in 2021 revealed maxillary sinus thickening but no acute issues.   It was also noted in 2017 that he had internal bleeding and a colonoscopy and EGD was performed in which the patient states has resolved. Platelet and blood transfusions administered.    Bone Marrow Biopsy  FINAL DIAGNOSIS 6/6/2024   A-C. BONE MARROW CLOT, CORE BIOPSY, AND ASPIRATE SMEAR, RIGHT ILIAC CREST:   -- SLIGHTLY HYPERCELLULAR BONE MARROW FOR AGE (50-60%) WITH MATURING TRILINEAGE HEMATOPOIESIS.   There is no definite morphologic, immunophenotypic and molecular evidence of myeloid neoplasm in the given sample. The possible etiologies for persistent cytopenias includes but not limited to infectious, autoimmune, medication induced or toxin related causes.     ID Statement:    PAMELA CANCINO is a 74 year old Male        Chief Complaint: Follow up for pancytopenia   Interval History:      He presents today in  with wife, Sole.   He reports his quality of life is low he feels fuzzy in the head and burns when he urinates.  Previous urinary tract infection, patient on Augmentin.    Here to discuss bone marrow biopsy that shows likely toxin chronic infection related.  Hypercellular bone marrow though no dysplasia, no definite morphologic, immunophenotypic and molecular evidence of myeloid neoplasm in the given sample. The possible  etiologies for persistent cytopenias includes but not limited to infectious, autoimmune, medication induced or toxin related causes. He plans on decreasing alcohol intake slowly to eventually quit in the next few weeks.  Advised to discuss this with his primary care.  Encouraged him to quit drinking appropriately.  Additionally, recurrent urinary tract infections.  Anemia persists.  Discussed blood pressure readings patient states this is due to whitecoat syndrome and that his blood pressure is 120s/80s at home.    Decreased appetite for a few days, no weight loss. No bleeding or bruising from any location.  Hematuria in urine though is straight cathing, unsure of cause.   Weakness and lethargic over 4 weeks.     He denies any headache, lymphadenopathy, fever, night sweats, shortness of breath, chest pain or palpitations, abdominal pain, constipation, diarrhea, nausea, vomiting, bloody stool or dark stool. Urinary issues are chronic and include dysuria, frequency and urgency.  Recently saw Nephrology who reports kidney function stable CKD. Nocturia continues. He has sleep apnea and is on a CPAP.  Patient denies any issues with appetite, or weight loss. Continues daily alcohol intake of 4 double bourbon despite advise for cessation.     Imaging Studies:  US abdomen on 08/31/2023 shows calculi vs sludge in gallbladder, hepatomegaly with similar appearance of hepatic cyst in left lobe of liver. Previous CT abdomen 04/11/2014 shows fatty liver, cyst in left lobe, portal HTN, likely cirrhosis of the liver. Borderline splenomegaly. Repeat CT abdomen 07/22/2018 shows hepatic cysts in left lobe, suggest benign etiology. Renal US 05/29/2017 and 07/22/2020, both unremarkable. On 06/12/2019 Ct Pelvis shows umbilical hernia and no prostate abscess. US renal complete 1/17/2024 Essentially negative renal ultrasound.        Medical history:  -Hypertension  -Aortic valve stenosis  -Chronic UTI  -A-fib on Xarelto  -Hearing loss  sensorineural  -Dizziness and vertigo  -Erectile dysfunction  -GERD  -Idiopathic neuropathy  -Back pain  -Obesity  -Peptic ulcer hemorrhage  -Vitamin D3 def  -Vitamin B12  -Tinnitus   -Anemia as above  -Pancytopenia as above     Social History:  - lives with wife Sole and two children (boy and girl)   -retired  -Daily alcohol intake   -Denies smoking or suing tobacco   -Occasional Marijuana use  -No illicit drug uses      Family History:  -mom  52 years breast cancer  -dad  84 years vascular disease   -Niece leukemia  as a teenager  -Sister with hypertension  -Daughter immune disease  -Brother myocardial infarction      Review of Systems:   System Review-All other systems including Neurologic, Cardiac, Gastrointestinal, Endocrine, Dermatologic, ENT, Respiratory, Infectious, Urologic, Musculoskeletal  have been reviewed and are negative for complaint outside of events noted below and within the assessment.         Allergies and Intolerances:       Allergies   Allergen Reactions    Levofloxacin Angioedema and Hives      Outpatient Medication Profile:  Current Outpatient Medications   Medication Instructions    allopurinol (ZYLOPRIM) 100 mg, oral, Daily    amoxicillin-pot clavulanate (Augmentin) 500-125 mg tablet 1 tablet, oral, 2 times daily    B complex-vitamin C-folic acid (Nephro-Chi Rx) 1- mg-mg-mcg tablet 1 tablet, oral, Daily with breakfast    carvedilol (COREG) 25 mg, oral, 2 times daily (morning and late afternoon), Take with food.    folic acid (FOLVITE) 1 mg, oral, Daily    levothyroxine (Synthroid, Levoxyl) 88 mcg tablet TAKE 1 TABLET BY MOUTH EVERY DAY ON EMPTY STOMACH    lisinopril 40 mg, oral, Daily    phenazopyridine (PYRIDIUM) 200 mg, oral, 3 times daily (morning, midday, late afternoon)    tamsulosin (FLOMAX) 0.4 mg, oral, Daily    thiamine (VITAMIN B-1) 100 mg, oral, Daily    Xarelto 20 mg, oral, Daily with evening meal      Past Medical History:   Diagnosis  "Date    Arthritis     Disease of thyroid gland     History of transfusion     Hypertension     Personal history of diseases of the blood and blood-forming organs and certain disorders involving the immune mechanism     History of hemorrhagic diathesis    Personal history of other diseases of the circulatory system     History of hypertension    Personal history of other diseases of the circulatory system 12/22/2022    History of aortic valve stenosis    Personal history of other diseases of the circulatory system 10/10/2021    History of chronic atrial fibrillation    Personal history of other diseases of the musculoskeletal system and connective tissue     History of spinal stenosis    Personal history of other diseases of the musculoskeletal system and connective tissue     History of arthritis    Personal history of other diseases of the nervous system and sense organs     History of sleep apnea      Performance:   ECOG Performance Status: 2- Ambulatory 50% of waking  hours          Vitals and Measurements:       4/15/2024     2:12 PM 5/2/2024    10:40 AM 5/29/2024    12:23 PM 5/29/2024     1:23 PM 5/29/2024     1:41 PM 5/29/2024     1:53 PM 6/13/2024     2:03 PM   Vitals   Systolic 144 149 182 138 143 154 195   Diastolic 82 72 91 73 80 79 78   Heart Rate 53 55 66 66 65 70 59   Temp 36.7 °C (98 °F) 36.2 °C (97.2 °F)     36.6 °C (97.9 °F)   Resp 16 18 18 20 20 18 16   Height (in) 1.88 m (6' 2\")         Weight (lb) 336 336     356.7   BMI 43.14 kg/m2 43.14 kg/m2     45.8 kg/m2   BSA (m2) 2.82 m2 2.82 m2     2.91 m2   Visit Report Report Report     Report      Physical Exam:      Constitutional: pallor noted, awake/alert/oriented  x3, no distress, alert and cooperative   Eyes: clear scleras   ENMT: mucous membranes moist, no apparent injury, no lesions seen   Head/Neck: Neck supple, no apparent injury, thyroid  without mass or tenderness, No JVD, trachea midline, no bruits   Respiratory/Thorax: Patent airways, CTAB, " diminished bases bilateral, normal  breath sounds with good chest expansion, thorax symmetric   Cardiovascular: arrhythmia at baseline, no murmurs, swelling in bilateral legs plus one   Gastrointestinal: Obese, soft, non-tender, no rebound tenderness or guarding, unable to palpate liver or spleen   Musculoskeletal: ROM intact, no joint swelling, normal  strength   Extremities: Swelling right ankle, brace, no cyanosis, no contusions or wounds, no clubbing, mild swelling ankles and discoloration, venous insufficiency bilateral lower legs.    Neurological: alert and oriented x3, intact senses,  motor, response and reflexes, normal strength for baseline   Lymphatic: No significant cervical or subclavicular  lymphadenopathy   Psychological: Appropriate mood and behavior. Assessment of alcohol cessation, patient does not want to quit drinking at this time   Skin: Warm and dry, pink cheeks, no lesions, no rashes      Lab Results:    Lab Results   Component Value Date    WBC 4.4 05/29/2024    NEUTROABS 3.20 05/29/2024    IGABSOL 0.02 05/29/2024    LYMPHSABS 0.72 (L) 05/29/2024    MONOSABS 0.32 05/29/2024    EOSABS 0.10 05/29/2024    BASOSABS 0.04 05/29/2024    RBC 2.97 (L) 05/29/2024     (H) 05/29/2024    MCHC 30.6 (L) 05/29/2024    HGB 10.5 (L) 05/29/2024    HCT 34.3 (L) 05/29/2024     (L) 05/29/2024     Lab Results   Component Value Date    RETICCTPCT 3.3 (H) 02/29/2024      Lab Results   Component Value Date    CREATININE 1.87 (H) 05/02/2024    BUN 36 (H) 05/02/2024    EGFR 37 (L) 05/02/2024     (L) 05/02/2024    K 5.0 05/02/2024     05/02/2024    CO2 18 (L) 05/02/2024      Lab Results   Component Value Date    ALT 14 05/02/2024    AST 22 05/02/2024    ALKPHOS 55 05/02/2024    BILITOT 0.5 05/02/2024      Lab Results   Component Value Date    TSH CANCELED 08/24/2023     Lab Results   Component Value Date    TSH CANCELED 08/24/2023     Lab Results   Component Value Date    IRON 88 05/02/2024     TIBC 288 05/02/2024    FERRITIN 248 05/02/2024      Lab Results   Component Value Date    BJPQWEPV96 1,671 (H) 11/30/2023      Lab Results   Component Value Date    FOLATE >24.0 11/30/2023     Lab Results   Component Value Date    SEDRATE 6 08/24/2023      Lab Results   Component Value Date    CRP CANCELED 08/24/2023        Lab Results   Component Value Date     02/29/2024     Lab Results   Component Value Date    HAPTOGLOBIN 124 11/30/2023     Lab Results   Component Value Date    SPEP Increase in polyclonal gamma globulins.   02/29/2024     Lab Results   Component Value Date    IGG 1,500 02/29/2024     02/29/2024     02/29/2024     Lab Results   Component Value Date    URICACID 8.0 (H) 05/02/2024         Assessment and Plan:   Pleasant 74-year-old male presents for pancytopenia follow-up.  Discussed likely contributors below along with not being able to R/O MDS. Also discussed daily alcohol use and chronic urinary tract infections as playing a role in his pancytopenia.  Pathologist review of CBCd reveal macrocytic anemia. Supplements of vital vitamin nutrients advised and prescribed.  Discussed the nutritional deficiency, liver disease and alcohol intake as likely causation. His MCV is 119, more deficient from previous value of 107-119 over the last year. WBC 4.8, Hgb 9 and dropping while taking supplements. RBC 2.49 and WBC 4.0, platelets are 85. His daily alcohol consumption is of concern for pancytopenia. He states understanding.  Concern for bleeding, he denies any signs of bleeding.  He is in agreement for bone marrow biopsy.  Iron panel stable.  Ferritin normal range.  Likely anemia of chronic disease versus bleeding, as additional differential.     Bone Marrow Biopsy  FINAL DIAGNOSIS 6/6/2024   A-C. BONE MARROW CLOT, CORE BIOPSY, AND ASPIRATE SMEAR, RIGHT ILIAC CREST:   -- SLIGHTLY HYPERCELLULAR BONE MARROW FOR AGE (50-60%) WITH MATURING TRILINEAGE HEMATOPOIESIS.   There is no definite  morphologic, immunophenotypic and molecular evidence of myeloid neoplasm in the given sample. The possible etiologies for persistent cytopenias includes but not limited to infectious, autoimmune, medication induced or toxin related causes.     Patient's hemoglobin is in the 9 range we will order Procrit injections.  Patient states blood pressure is managed at home and he has whitecoat syndrome while in the office.  He is on hydralazine 50 mg twice daily.  Originally prescribed 3 times a day and was causing him hypotension.    Hematuria please follow Urology.  Please follow-up with primary care regarding cessation of alcohol plan.    Advised to also follow with PCP for additional assistance. Advised safe cessation and to reach out to a reputable resource.   Resources for ETOH:  [employer] Hotline: 1-274.179.5953   Alcohol Anonymous [facility] www.PATHEOS.org or 1-652.312.2101     Additional Observations, history, and referrals:  He is wheel chair bound other than pivot and transfer independently, increased weakness. His physical activity is limited by physical pain back and joints. Obesity noted. He has followed inpatient cardiology 09/2021 and 11/2022 for severe aortic stenosis and a-flutter. Follow cardiology yearly for ECHO and assessment. He is on Xarelto and ASA. Followed GI 08/31/2017 for colonoscopy for LLQ pain. Redundant colon noted. No specimens taken. Repeat five years recommended.   Patient could have IBS secondary to alcohol abuse.  Patient originally followed with GI/hepatology.  Who did not feel liver biopsy was necessary as obvious evidence of disease not noted.  He will avoid a FibroScan because he thinks it will have a false positive given alcohol use.  Her Dr. Alcides Ohara's note dated 11/30/2023.      I have reviewed the patient's MHR of all notes, labs, imaging, procedures to assist in evaluation of health. Some of his health records were obtained outside resources [facility].    Follow-up:  RTC:  -2  months  -Biweekly Procrit ordered at 20, 000 units with standard protocol and administration guidelines per treatment plan    Medications:  -Continue supplements as prescribed    Imaging:  -NA     Referral/Additional appointments:   -PCP as scheduled/needed if persistent illness persists, address uric acid levels, and Hematuria     Discussed the results of laboratory work-up in detail.  Patient states understanding and agreeable to plan of care.  Patient will call with any questions or concerns.        Thank you allowing me to care for you today.    Sincerely,  Vickie Srivastava, APRN-CNP     This note has been written with voice recognition software if there is need for clarity please reach out.

## 2024-06-13 NOTE — LETTER
June 13, 2024     Luis Eduardo Kay DO  [address] [address]  [facility] [address]    Patient: Aly Mccarty   YOB: 1950   Date of Visit: 6/13/2024       Dear Dr. Luis Eduardo Kay DO:    Below are my notes from this patient visit.  Bone marrow hypercellular for age however no definitive morphologic, or myeloid neoplasm noted.  Likely secondary due to toxicity versus infections.  Patient stated today that he would like to start working on reducing his alcohol intake to complete cessation in the next few weeks.  He is taking his Augmentin and he is improving slightly.  However he still is having burning with urination.   If you have questions, please do not hesitate to call me. I look forward to following your patient along with you.       Sincerely,     Vickie Srivastava, APRN-CNP      CC: No Recipients  ______________________________________________________________________________________    Patient Visit Information:   Visit Type: Follow Up Visit      History of Present Illness:   Hematology History:  Patient is a 74-year-old male that presents with his wife, Sole, for follow up of pancytopenia. Patient presents in a wheelchair.  Outside lab records indicate pancytopenia ongoing since 2014.  Wife states he has been hospitalized in 2017 for over 5 months for osteomyelitis and septicemia.  He has chronic urinary tract infections. Last imaging CT chest abdomen and pelvis in 2022 reveal CAD, atherosclerosis, calcified aortic valve, pulmonary hypertension, dilated pulmonary artery and hernia.    Head CT in 2021 revealed maxillary sinus thickening but no acute issues.   It was also noted in 2017 that he had internal bleeding and a colonoscopy and EGD was performed in which the patient states has resolved. Platelet and blood transfusions administered.    Bone Marrow Biopsy  FINAL DIAGNOSIS 6/6/2024   A-C. BONE MARROW CLOT, CORE BIOPSY, AND ASPIRATE SMEAR, RIGHT ILIAC CREST:   -- SLIGHTLY HYPERCELLULAR BONE MARROW  FOR AGE (50-60%) WITH MATURING TRILINEAGE HEMATOPOIESIS.   There is no definite morphologic, immunophenotypic and molecular evidence of myeloid neoplasm in the given sample. The possible etiologies for persistent cytopenias includes but not limited to infectious, autoimmune, medication induced or toxin related causes.     ID Statement:    PAMELA CANCINO is a 74 year old Male        Chief Complaint: Follow up for pancytopenia   Interval History:      He presents today in  with wife, Sole.   He reports his quality of life is low he feels fuzzy in the head and burns when he urinates.  Previous urinary tract infection, patient on Augmentin.    Here to discuss bone marrow biopsy that shows likely toxin chronic infection related.  Hypercellular bone marrow though no dysplasia, no definite morphologic, immunophenotypic and molecular evidence of myeloid neoplasm in the given sample. The possible etiologies for persistent cytopenias includes but not limited to infectious, autoimmune, medication induced or toxin related causes. He plans on decreasing alcohol intake slowly to eventually quit in the next few weeks.  Advised to discuss this with his primary care.  Encouraged him to quit drinking appropriately.  Additionally, recurrent urinary tract infections.  Anemia persists.  Discussed blood pressure readings patient states this is due to whitecoat syndrome and that his blood pressure is 120s/80s at home.    Decreased appetite for a few days, no weight loss. No bleeding or bruising from any location.  Hematuria in urine though is straight cathing, unsure of cause.   Weakness and lethargic over 4 weeks.     He denies any headache, lymphadenopathy, fever, night sweats, shortness of breath, chest pain or palpitations, abdominal pain, constipation, diarrhea, nausea, vomiting, bloody stool or dark stool. Urinary issues are chronic and include dysuria, frequency and urgency.  Recently saw Nephrology who reports kidney  function stable CKD. Nocturia continues. He has sleep apnea and is on a CPAP.  Patient denies any issues with appetite, or weight loss. Continues daily alcohol intake of 4 double bourbon despite advise for cessation.     Imaging Studies:  US abdomen on 2023 shows calculi vs sludge in gallbladder, hepatomegaly with similar appearance of hepatic cyst in left lobe of liver. Previous CT abdomen 2014 shows fatty liver, cyst in left lobe, portal HTN, likely cirrhosis of the liver. Borderline splenomegaly. Repeat CT abdomen 2018 shows hepatic cysts in left lobe, suggest benign etiology. Renal US 2017 and 2020, both unremarkable. On 2019 Ct Pelvis shows umbilical hernia and no prostate abscess. US renal complete 2024 Essentially negative renal ultrasound.        Medical history:  -Hypertension  -Aortic valve stenosis  -Chronic UTI  -A-fib on Xarelto  -Hearing loss sensorineural  -Dizziness and vertigo  -Erectile dysfunction  -GERD  -Idiopathic neuropathy  -Back pain  -Obesity  -Peptic ulcer hemorrhage  -Vitamin D3 def  -Vitamin B12  -Tinnitus   -Anemia as above  -Pancytopenia as above     Social History:  - lives with wife Sole and two children (boy and girl)   -retired  -Daily alcohol intake   -Denies smoking or suing tobacco   -Occasional Marijuana use  -No illicit drug uses      Family History:  -mom  52 years breast cancer  -dad  84 years vascular disease   -Niece leukemia  as a teenager  -Sister with hypertension  -Daughter immune disease  -Brother myocardial infarction      Review of Systems:   System Review-All other systems including Neurologic, Cardiac, Gastrointestinal, Endocrine, Dermatologic, ENT, Respiratory, Infectious, Urologic, Musculoskeletal  have been reviewed and are negative for complaint outside of events noted below and within the assessment.         Allergies and Intolerances:       Allergies   Allergen Reactions   •  Levofloxacin Angioedema and Hives      Outpatient Medication Profile:  Current Outpatient Medications   Medication Instructions   • allopurinol (ZYLOPRIM) 100 mg, oral, Daily   • amoxicillin-pot clavulanate (Augmentin) 500-125 mg tablet 1 tablet, oral, 2 times daily   • B complex-vitamin C-folic acid (Nephro-Chi Rx) 1- mg-mg-mcg tablet 1 tablet, oral, Daily with breakfast   • carvedilol (COREG) 25 mg, oral, 2 times daily (morning and late afternoon), Take with food.   • folic acid (FOLVITE) 1 mg, oral, Daily   • levothyroxine (Synthroid, Levoxyl) 88 mcg tablet TAKE 1 TABLET BY MOUTH EVERY DAY ON EMPTY STOMACH   • lisinopril 40 mg, oral, Daily   • phenazopyridine (PYRIDIUM) 200 mg, oral, 3 times daily (morning, midday, late afternoon)   • tamsulosin (FLOMAX) 0.4 mg, oral, Daily   • thiamine (VITAMIN B-1) 100 mg, oral, Daily   • Xarelto 20 mg, oral, Daily with evening meal      Past Medical History:   Diagnosis Date   • Arthritis    • Disease of thyroid gland    • History of transfusion    • Hypertension    • Personal history of diseases of the blood and blood-forming organs and certain disorders involving the immune mechanism     History of hemorrhagic diathesis   • Personal history of other diseases of the circulatory system     History of hypertension   • Personal history of other diseases of the circulatory system 12/22/2022    History of aortic valve stenosis   • Personal history of other diseases of the circulatory system 10/10/2021    History of chronic atrial fibrillation   • Personal history of other diseases of the musculoskeletal system and connective tissue     History of spinal stenosis   • Personal history of other diseases of the musculoskeletal system and connective tissue     History of arthritis   • Personal history of other diseases of the nervous system and sense organs     History of sleep apnea      Performance:   ECOG Performance Status: 2- Ambulatory 50% of waking  hours          Vitals  "and Measurements:       4/15/2024     2:12 PM 5/2/2024    10:40 AM 5/29/2024    12:23 PM 5/29/2024     1:23 PM 5/29/2024     1:41 PM 5/29/2024     1:53 PM 6/13/2024     2:03 PM   Vitals   Systolic 144 149 182 138 143 154 195   Diastolic 82 72 91 73 80 79 78   Heart Rate 53 55 66 66 65 70 59   Temp 36.7 °C (98 °F) 36.2 °C (97.2 °F)     36.6 °C (97.9 °F)   Resp 16 18 18 20 20 18 16   Height (in) 1.88 m (6' 2\")         Weight (lb) 336 336     356.7   BMI 43.14 kg/m2 43.14 kg/m2     45.8 kg/m2   BSA (m2) 2.82 m2 2.82 m2     2.91 m2   Visit Report Report Report     Report      Physical Exam:      Constitutional: pallor noted, awake/alert/oriented  x3, no distress, alert and cooperative   Eyes: clear scleras   ENMT: mucous membranes moist, no apparent injury, no lesions seen   Head/Neck: Neck supple, no apparent injury, thyroid  without mass or tenderness, No JVD, trachea midline, no bruits   Respiratory/Thorax: Patent airways, CTAB, diminished bases bilateral, normal  breath sounds with good chest expansion, thorax symmetric   Cardiovascular: arrhythmia at baseline, no murmurs, swelling in bilateral legs plus one   Gastrointestinal: Obese, soft, non-tender, no rebound tenderness or guarding, unable to palpate liver or spleen   Musculoskeletal: ROM intact, no joint swelling, normal  strength   Extremities: Swelling right ankle, brace, no cyanosis, no contusions or wounds, no clubbing, mild swelling ankles and discoloration, venous insufficiency bilateral lower legs.    Neurological: alert and oriented x3, intact senses,  motor, response and reflexes, normal strength for baseline   Lymphatic: No significant cervical or subclavicular  lymphadenopathy   Psychological: Appropriate mood and behavior. Assessment of alcohol cessation, patient does not want to quit drinking at this time   Skin: Warm and dry, pink cheeks, no lesions, no rashes      Lab Results:    Lab Results   Component Value Date    WBC 4.4 05/29/2024    " NEUTROABS 3.20 05/29/2024    IGABSOL 0.02 05/29/2024    LYMPHSABS 0.72 (L) 05/29/2024    MONOSABS 0.32 05/29/2024    EOSABS 0.10 05/29/2024    BASOSABS 0.04 05/29/2024    RBC 2.97 (L) 05/29/2024     (H) 05/29/2024    MCHC 30.6 (L) 05/29/2024    HGB 10.5 (L) 05/29/2024    HCT 34.3 (L) 05/29/2024     (L) 05/29/2024     Lab Results   Component Value Date    RETICCTPCT 3.3 (H) 02/29/2024      Lab Results   Component Value Date    CREATININE 1.87 (H) 05/02/2024    BUN 36 (H) 05/02/2024    EGFR 37 (L) 05/02/2024     (L) 05/02/2024    K 5.0 05/02/2024     05/02/2024    CO2 18 (L) 05/02/2024      Lab Results   Component Value Date    ALT 14 05/02/2024    AST 22 05/02/2024    ALKPHOS 55 05/02/2024    BILITOT 0.5 05/02/2024      Lab Results   Component Value Date    TSH CANCELED 08/24/2023     Lab Results   Component Value Date    TSH CANCELED 08/24/2023     Lab Results   Component Value Date    IRON 88 05/02/2024    TIBC 288 05/02/2024    FERRITIN 248 05/02/2024      Lab Results   Component Value Date    JUWVRLXU81 1,671 (H) 11/30/2023      Lab Results   Component Value Date    FOLATE >24.0 11/30/2023     Lab Results   Component Value Date    SEDRATE 6 08/24/2023      Lab Results   Component Value Date    CRP CANCELED 08/24/2023        Lab Results   Component Value Date     02/29/2024     Lab Results   Component Value Date    HAPTOGLOBIN 124 11/30/2023     Lab Results   Component Value Date    SPEP Increase in polyclonal gamma globulins.   02/29/2024     Lab Results   Component Value Date    IGG 1,500 02/29/2024     02/29/2024     02/29/2024     Lab Results   Component Value Date    URICACID 8.0 (H) 05/02/2024         Assessment and Plan:   Pleasant 74-year-old male presents for pancytopenia follow-up.  Discussed likely contributors below along with not being able to R/O MDS. Also discussed daily alcohol use and chronic urinary tract infections as playing a role in his  pancytopenia.  Pathologist review of CBCd reveal macrocytic anemia. Supplements of vital vitamin nutrients advised and prescribed.  Discussed the nutritional deficiency, liver disease and alcohol intake as likely causation. His MCV is 119, more deficient from previous value of 107-119 over the last year. WBC 4.8, Hgb 9 and dropping while taking supplements. RBC 2.49 and WBC 4.0, platelets are 85. His daily alcohol consumption is of concern for pancytopenia. He states understanding.  Concern for bleeding, he denies any signs of bleeding.  He is in agreement for bone marrow biopsy.  Iron panel stable.  Ferritin normal range.  Likely anemia of chronic disease versus bleeding, as additional differential.     Bone Marrow Biopsy  FINAL DIAGNOSIS 6/6/2024   A-C. BONE MARROW CLOT, CORE BIOPSY, AND ASPIRATE SMEAR, RIGHT ILIAC CREST:   -- SLIGHTLY HYPERCELLULAR BONE MARROW FOR AGE (50-60%) WITH MATURING TRILINEAGE HEMATOPOIESIS.   There is no definite morphologic, immunophenotypic and molecular evidence of myeloid neoplasm in the given sample. The possible etiologies for persistent cytopenias includes but not limited to infectious, autoimmune, medication induced or toxin related causes.     Patient's hemoglobin is in the 9 range we will order Procrit injections.  Patient states blood pressure is managed at home and he has whitecoat syndrome while in the office.  He is on hydralazine 50 mg twice daily.  Originally prescribed 3 times a day and was causing him hypotension.    Hematuria please follow Urology.  Please follow-up with primary care regarding cessation of alcohol plan.    Advised to also follow with PCP for additional assistance. Advised safe cessation and to reach out to a reputable resource.   Resources for ETOH:  [employer] Hotline: 1-989.600.2980   Alcohol Anonymous [facility] www.aacle.org or 1-701.324.6716     Additional Observations, history, and referrals:  He is wheel chair bound other than pivot and transfer  independently, increased weakness. His physical activity is limited by physical pain back and joints. Obesity noted. He has followed inpatient cardiology 09/2021 and 11/2022 for severe aortic stenosis and a-flutter. Follow cardiology yearly for ECHO and assessment. He is on Xarelto and ASA. Followed GI 08/31/2017 for colonoscopy for LLQ pain. Redundant colon noted. No specimens taken. Repeat five years recommended.   Patient could have IBS secondary to alcohol abuse.  Patient originally followed with GI/hepatology.  Who did not feel liver biopsy was necessary as obvious evidence of disease not noted.  He will avoid a FibroScan because he thinks it will have a false positive given alcohol use.  Her Dr. Alcides Ohara's note dated 11/30/2023.      I have reviewed the patient's MHR of all notes, labs, imaging, procedures to assist in evaluation of health. Some of his health records were obtained outside resources [facility].    Follow-up:  RTC:  -2 months  -Biweekly Procrit ordered at 20, 000 units with standard protocol and administration guidelines per treatment plan    Medications:  -Continue supplements as prescribed    Imaging:  -NA     Referral/Additional appointments:   -PCP as scheduled/needed if persistent illness persists, address uric acid levels, and Hematuria     Discussed the results of laboratory work-up in detail.  Patient states understanding and agreeable to plan of care.  Patient will call with any questions or concerns.        Thank you allowing me to care for you today.    Sincerely,  Vickie Srivastava, APRN-CNP     This note has been written with voice recognition software if there is need for clarity please reach out.

## 2024-06-19 ENCOUNTER — INFUSION (OUTPATIENT)
Dept: HEMATOLOGY/ONCOLOGY | Facility: CLINIC | Age: 74
End: 2024-06-19
Payer: MEDICARE

## 2024-06-19 VITALS
BODY MASS INDEX: 46 KG/M2 | RESPIRATION RATE: 18 BRPM | OXYGEN SATURATION: 90 % | SYSTOLIC BLOOD PRESSURE: 154 MMHG | HEART RATE: 65 BPM | DIASTOLIC BLOOD PRESSURE: 70 MMHG | WEIGHT: 315 LBS | TEMPERATURE: 97.2 F

## 2024-06-19 DIAGNOSIS — D63.1 ANEMIA DUE TO STAGE 4 CHRONIC KIDNEY DISEASE (MULTI): Primary | ICD-10-CM

## 2024-06-19 DIAGNOSIS — N18.4 ANEMIA DUE TO STAGE 4 CHRONIC KIDNEY DISEASE (MULTI): Primary | ICD-10-CM

## 2024-06-19 LAB
BASOPHILS # BLD AUTO: 0.02 X10*3/UL (ref 0–0.1)
BASOPHILS NFR BLD AUTO: 0.4 %
EOSINOPHIL # BLD AUTO: 0.13 X10*3/UL (ref 0–0.4)
EOSINOPHIL NFR BLD AUTO: 2.7 %
ERYTHROCYTE [DISTWIDTH] IN BLOOD BY AUTOMATED COUNT: 14.4 % (ref 11.5–14.5)
FERRITIN SERPL-MCNC: 117 NG/ML (ref 20–300)
HCT VFR BLD AUTO: 33.5 % (ref 41–52)
HGB BLD-MCNC: 10.4 G/DL (ref 13.5–17.5)
IMM GRANULOCYTES # BLD AUTO: 0.01 X10*3/UL (ref 0–0.5)
IMM GRANULOCYTES NFR BLD AUTO: 0.2 % (ref 0–0.9)
IRON SATN MFR SERPL: 21 % (ref 25–45)
IRON SERPL-MCNC: 71 UG/DL (ref 35–150)
LYMPHOCYTES # BLD AUTO: 0.83 X10*3/UL (ref 0.8–3)
LYMPHOCYTES NFR BLD AUTO: 16.9 %
MCH RBC QN AUTO: 35.1 PG (ref 26–34)
MCHC RBC AUTO-ENTMCNC: 31 G/DL (ref 32–36)
MCV RBC AUTO: 113 FL (ref 80–100)
MONOCYTES # BLD AUTO: 0.26 X10*3/UL (ref 0.05–0.8)
MONOCYTES NFR BLD AUTO: 5.3 %
NEUTROPHILS # BLD AUTO: 3.65 X10*3/UL (ref 1.6–5.5)
NEUTROPHILS NFR BLD AUTO: 74.5 %
PLATELET # BLD AUTO: 90 X10*3/UL (ref 150–450)
RBC # BLD AUTO: 2.96 X10*6/UL (ref 4.5–5.9)
TIBC SERPL-MCNC: 338 UG/DL (ref 240–445)
UIBC SERPL-MCNC: 267 UG/DL (ref 110–370)
WBC # BLD AUTO: 4.9 X10*3/UL (ref 4.4–11.3)

## 2024-06-19 PROCEDURE — 96372 THER/PROPH/DIAG INJ SC/IM: CPT

## 2024-06-19 PROCEDURE — 36415 COLL VENOUS BLD VENIPUNCTURE: CPT

## 2024-06-19 PROCEDURE — 6350000001 HC RX 635 EPOETIN >10,000 UNITS: Mod: JG,EC

## 2024-06-19 RX ORDER — ALBUTEROL SULFATE 0.83 MG/ML
3 SOLUTION RESPIRATORY (INHALATION) AS NEEDED
OUTPATIENT
Start: 2024-07-03

## 2024-06-19 RX ORDER — FAMOTIDINE 10 MG/ML
20 INJECTION INTRAVENOUS ONCE AS NEEDED
OUTPATIENT
Start: 2024-07-03

## 2024-06-19 RX ORDER — EPINEPHRINE 0.3 MG/.3ML
0.3 INJECTION SUBCUTANEOUS EVERY 5 MIN PRN
OUTPATIENT
Start: 2024-07-03

## 2024-06-19 RX ORDER — DIPHENHYDRAMINE HYDROCHLORIDE 50 MG/ML
50 INJECTION INTRAMUSCULAR; INTRAVENOUS AS NEEDED
OUTPATIENT
Start: 2024-07-03

## 2024-06-19 ASSESSMENT — PAIN SCALES - GENERAL: PAINLEVEL: 0-NO PAIN

## 2024-06-19 NOTE — PROGRESS NOTES
Patient received first dose of Procrit, tolerated injection well, left ambulatory in stable condition.

## 2024-06-26 DIAGNOSIS — E03.8 OTHER SPECIFIED HYPOTHYROIDISM: ICD-10-CM

## 2024-06-26 DIAGNOSIS — D61.818 PANCYTOPENIA (MULTI): ICD-10-CM

## 2024-06-26 RX ORDER — LEVOTHYROXINE SODIUM 88 UG/1
TABLET ORAL
Qty: 30 TABLET | Refills: 0 | Status: SHIPPED | OUTPATIENT
Start: 2024-06-26

## 2024-06-28 RX ORDER — FOLIC ACID 1 MG/1
1 TABLET ORAL DAILY
Qty: 90 TABLET | Refills: 1 | Status: SHIPPED | OUTPATIENT
Start: 2024-06-28

## 2024-07-01 ENCOUNTER — APPOINTMENT (OUTPATIENT)
Dept: RADIOLOGY | Facility: HOSPITAL | Age: 74
End: 2024-07-01
Payer: MEDICARE

## 2024-07-01 ENCOUNTER — APPOINTMENT (OUTPATIENT)
Dept: CARDIOLOGY | Facility: HOSPITAL | Age: 74
End: 2024-07-01
Payer: MEDICARE

## 2024-07-01 ENCOUNTER — HOSPITAL ENCOUNTER (INPATIENT)
Facility: HOSPITAL | Age: 74
LOS: 2 days | Discharge: HOME | End: 2024-07-03
Attending: STUDENT IN AN ORGANIZED HEALTH CARE EDUCATION/TRAINING PROGRAM | Admitting: STUDENT IN AN ORGANIZED HEALTH CARE EDUCATION/TRAINING PROGRAM
Payer: MEDICARE

## 2024-07-01 DIAGNOSIS — N13.8 BPH WITH OBSTRUCTION/LOWER URINARY TRACT SYMPTOMS: ICD-10-CM

## 2024-07-01 DIAGNOSIS — K56.691 OTHER COMPLETE INTESTINAL OBSTRUCTION (MULTI): Primary | ICD-10-CM

## 2024-07-01 DIAGNOSIS — N40.1 BPH WITH OBSTRUCTION/LOWER URINARY TRACT SYMPTOMS: ICD-10-CM

## 2024-07-01 LAB
ALBUMIN SERPL BCP-MCNC: 4.1 G/DL (ref 3.4–5)
ALP SERPL-CCNC: 124 U/L (ref 33–136)
ALT SERPL W P-5'-P-CCNC: 29 U/L (ref 10–52)
ANION GAP SERPL CALC-SCNC: 16 MMOL/L (ref 10–20)
AST SERPL W P-5'-P-CCNC: 28 U/L (ref 9–39)
BASOPHILS # BLD AUTO: 0.02 X10*3/UL (ref 0–0.1)
BASOPHILS NFR BLD AUTO: 0.2 %
BILIRUB DIRECT SERPL-MCNC: 0.4 MG/DL (ref 0–0.3)
BILIRUB SERPL-MCNC: 1.6 MG/DL (ref 0–1.2)
BNP SERPL-MCNC: 594 PG/ML (ref 0–99)
BUN SERPL-MCNC: 20 MG/DL (ref 6–23)
CALCIUM SERPL-MCNC: 9.5 MG/DL (ref 8.6–10.3)
CARDIAC TROPONIN I PNL SERPL HS: 40 NG/L (ref 0–20)
CARDIAC TROPONIN I PNL SERPL HS: 40 NG/L (ref 0–20)
CARDIAC TROPONIN I PNL SERPL HS: 41 NG/L (ref 0–20)
CHLORIDE SERPL-SCNC: 104 MMOL/L (ref 98–107)
CO2 SERPL-SCNC: 23 MMOL/L (ref 21–32)
CREAT SERPL-MCNC: 1.42 MG/DL (ref 0.5–1.3)
EGFRCR SERPLBLD CKD-EPI 2021: 52 ML/MIN/1.73M*2
EOSINOPHIL # BLD AUTO: 0.01 X10*3/UL (ref 0–0.4)
EOSINOPHIL NFR BLD AUTO: 0.1 %
ERYTHROCYTE [DISTWIDTH] IN BLOOD BY AUTOMATED COUNT: 14.1 % (ref 11.5–14.5)
GLUCOSE SERPL-MCNC: 126 MG/DL (ref 74–99)
HCT VFR BLD AUTO: 38.8 % (ref 41–52)
HGB BLD-MCNC: 11.9 G/DL (ref 13.5–17.5)
IMM GRANULOCYTES # BLD AUTO: 0.08 X10*3/UL (ref 0–0.5)
IMM GRANULOCYTES NFR BLD AUTO: 0.7 % (ref 0–0.9)
LACTATE SERPL-SCNC: 1.1 MMOL/L (ref 0.4–2)
LIPASE SERPL-CCNC: 8 U/L (ref 9–82)
LYMPHOCYTES # BLD AUTO: 0.43 X10*3/UL (ref 0.8–3)
LYMPHOCYTES NFR BLD AUTO: 3.7 %
MCH RBC QN AUTO: 34.2 PG (ref 26–34)
MCHC RBC AUTO-ENTMCNC: 30.7 G/DL (ref 32–36)
MCV RBC AUTO: 112 FL (ref 80–100)
MONOCYTES # BLD AUTO: 0.48 X10*3/UL (ref 0.05–0.8)
MONOCYTES NFR BLD AUTO: 4.1 %
NEUTROPHILS # BLD AUTO: 10.61 X10*3/UL (ref 1.6–5.5)
NEUTROPHILS NFR BLD AUTO: 91.2 %
NRBC BLD-RTO: 0 /100 WBCS (ref 0–0)
PLATELET # BLD AUTO: 101 X10*3/UL (ref 150–450)
POTASSIUM SERPL-SCNC: 3.9 MMOL/L (ref 3.5–5.3)
PROT SERPL-MCNC: 7.4 G/DL (ref 6.4–8.2)
Q ONSET: 223 MS
QRS COUNT: 11 BEATS
QRS DURATION: 118 MS
QT INTERVAL: 434 MS
QTC CALCULATION(BAZETT): 468 MS
QTC FREDERICIA: 457 MS
R AXIS: -45 DEGREES
RBC # BLD AUTO: 3.48 X10*6/UL (ref 4.5–5.9)
SODIUM SERPL-SCNC: 139 MMOL/L (ref 136–145)
T AXIS: 88 DEGREES
T OFFSET: 440 MS
VENTRICULAR RATE: 70 BPM
WBC # BLD AUTO: 11.6 X10*3/UL (ref 4.4–11.3)

## 2024-07-01 PROCEDURE — 2550000001 HC RX 255 CONTRASTS: Performed by: STUDENT IN AN ORGANIZED HEALTH CARE EDUCATION/TRAINING PROGRAM

## 2024-07-01 PROCEDURE — 2500000004 HC RX 250 GENERAL PHARMACY W/ HCPCS (ALT 636 FOR OP/ED)

## 2024-07-01 PROCEDURE — 85025 COMPLETE CBC W/AUTO DIFF WBC: CPT | Performed by: STUDENT IN AN ORGANIZED HEALTH CARE EDUCATION/TRAINING PROGRAM

## 2024-07-01 PROCEDURE — 96375 TX/PRO/DX INJ NEW DRUG ADDON: CPT

## 2024-07-01 PROCEDURE — 36415 COLL VENOUS BLD VENIPUNCTURE: CPT

## 2024-07-01 PROCEDURE — 93005 ELECTROCARDIOGRAM TRACING: CPT

## 2024-07-01 PROCEDURE — 74018 RADEX ABDOMEN 1 VIEW: CPT

## 2024-07-01 PROCEDURE — 83880 ASSAY OF NATRIURETIC PEPTIDE: CPT | Performed by: STUDENT IN AN ORGANIZED HEALTH CARE EDUCATION/TRAINING PROGRAM

## 2024-07-01 PROCEDURE — 74177 CT ABD & PELVIS W/CONTRAST: CPT | Performed by: RADIOLOGY

## 2024-07-01 PROCEDURE — 74018 RADEX ABDOMEN 1 VIEW: CPT | Performed by: RADIOLOGY

## 2024-07-01 PROCEDURE — 0D9670Z DRAINAGE OF STOMACH WITH DRAINAGE DEVICE, VIA NATURAL OR ARTIFICIAL OPENING: ICD-10-PCS | Performed by: STUDENT IN AN ORGANIZED HEALTH CARE EDUCATION/TRAINING PROGRAM

## 2024-07-01 PROCEDURE — 2500000004 HC RX 250 GENERAL PHARMACY W/ HCPCS (ALT 636 FOR OP/ED): Performed by: STUDENT IN AN ORGANIZED HEALTH CARE EDUCATION/TRAINING PROGRAM

## 2024-07-01 PROCEDURE — 99223 1ST HOSP IP/OBS HIGH 75: CPT | Performed by: SURGERY

## 2024-07-01 PROCEDURE — 99223 1ST HOSP IP/OBS HIGH 75: CPT | Performed by: STUDENT IN AN ORGANIZED HEALTH CARE EDUCATION/TRAINING PROGRAM

## 2024-07-01 PROCEDURE — 71260 CT THORAX DX C+: CPT | Performed by: RADIOLOGY

## 2024-07-01 PROCEDURE — 83605 ASSAY OF LACTIC ACID: CPT | Performed by: STUDENT IN AN ORGANIZED HEALTH CARE EDUCATION/TRAINING PROGRAM

## 2024-07-01 PROCEDURE — 36415 COLL VENOUS BLD VENIPUNCTURE: CPT | Performed by: STUDENT IN AN ORGANIZED HEALTH CARE EDUCATION/TRAINING PROGRAM

## 2024-07-01 PROCEDURE — 84484 ASSAY OF TROPONIN QUANT: CPT | Performed by: STUDENT IN AN ORGANIZED HEALTH CARE EDUCATION/TRAINING PROGRAM

## 2024-07-01 PROCEDURE — 74177 CT ABD & PELVIS W/CONTRAST: CPT

## 2024-07-01 PROCEDURE — 83690 ASSAY OF LIPASE: CPT | Performed by: STUDENT IN AN ORGANIZED HEALTH CARE EDUCATION/TRAINING PROGRAM

## 2024-07-01 PROCEDURE — 84484 ASSAY OF TROPONIN QUANT: CPT

## 2024-07-01 PROCEDURE — 82248 BILIRUBIN DIRECT: CPT | Performed by: STUDENT IN AN ORGANIZED HEALTH CARE EDUCATION/TRAINING PROGRAM

## 2024-07-01 PROCEDURE — 99285 EMERGENCY DEPT VISIT HI MDM: CPT

## 2024-07-01 PROCEDURE — 1200000002 HC GENERAL ROOM WITH TELEMETRY DAILY

## 2024-07-01 PROCEDURE — 80053 COMPREHEN METABOLIC PANEL: CPT | Performed by: STUDENT IN AN ORGANIZED HEALTH CARE EDUCATION/TRAINING PROGRAM

## 2024-07-01 PROCEDURE — 96374 THER/PROPH/DIAG INJ IV PUSH: CPT

## 2024-07-01 RX ORDER — MORPHINE SULFATE 4 MG/ML
4 INJECTION INTRAVENOUS ONCE
Status: COMPLETED | OUTPATIENT
Start: 2024-07-01 | End: 2024-07-01

## 2024-07-01 RX ORDER — LORATADINE 10 MG/1
10 TABLET ORAL DAILY
Status: DISCONTINUED | OUTPATIENT
Start: 2024-07-01 | End: 2024-07-03 | Stop reason: HOSPADM

## 2024-07-01 RX ORDER — SODIUM CHLORIDE, SODIUM LACTATE, POTASSIUM CHLORIDE, CALCIUM CHLORIDE 600; 310; 30; 20 MG/100ML; MG/100ML; MG/100ML; MG/100ML
100 INJECTION, SOLUTION INTRAVENOUS CONTINUOUS
Status: DISCONTINUED | OUTPATIENT
Start: 2024-07-01 | End: 2024-07-03 | Stop reason: HOSPADM

## 2024-07-01 RX ORDER — LANOLIN ALCOHOL/MO/W.PET/CERES
100 CREAM (GRAM) TOPICAL DAILY
Status: DISCONTINUED | OUTPATIENT
Start: 2024-07-01 | End: 2024-07-03 | Stop reason: HOSPADM

## 2024-07-01 RX ORDER — LANOLIN ALCOHOL/MO/W.PET/CERES
2000 CREAM (GRAM) TOPICAL EVERY OTHER DAY
Status: DISCONTINUED | OUTPATIENT
Start: 2024-07-02 | End: 2024-07-03 | Stop reason: HOSPADM

## 2024-07-01 RX ORDER — HYDRALAZINE HYDROCHLORIDE 50 MG/1
50 TABLET, FILM COATED ORAL 2 TIMES DAILY
Status: DISCONTINUED | OUTPATIENT
Start: 2024-07-01 | End: 2024-07-03 | Stop reason: HOSPADM

## 2024-07-01 RX ORDER — ONDANSETRON 4 MG/1
4 TABLET, FILM COATED ORAL EVERY 6 HOURS PRN
Status: DISCONTINUED | OUTPATIENT
Start: 2024-07-01 | End: 2024-07-03 | Stop reason: HOSPADM

## 2024-07-01 RX ORDER — LEVOTHYROXINE SODIUM 88 UG/1
88 TABLET ORAL EVERY MORNING
Status: DISCONTINUED | OUTPATIENT
Start: 2024-07-02 | End: 2024-07-03 | Stop reason: HOSPADM

## 2024-07-01 RX ORDER — ONDANSETRON HYDROCHLORIDE 2 MG/ML
4 INJECTION, SOLUTION INTRAVENOUS EVERY 6 HOURS PRN
Status: DISCONTINUED | OUTPATIENT
Start: 2024-07-01 | End: 2024-07-03 | Stop reason: HOSPADM

## 2024-07-01 RX ORDER — FOLIC ACID 1 MG/1
1 TABLET ORAL DAILY
Status: DISCONTINUED | OUTPATIENT
Start: 2024-07-01 | End: 2024-07-03 | Stop reason: HOSPADM

## 2024-07-01 RX ORDER — LEVOTHYROXINE SODIUM 88 UG/1
88 TABLET ORAL DAILY
Status: DISCONTINUED | OUTPATIENT
Start: 2024-07-01 | End: 2024-07-03

## 2024-07-01 RX ORDER — PHENAZOPYRIDINE HYDROCHLORIDE 100 MG/1
200 TABLET, FILM COATED ORAL
Status: DISCONTINUED | OUTPATIENT
Start: 2024-07-01 | End: 2024-07-03 | Stop reason: HOSPADM

## 2024-07-01 RX ORDER — CARVEDILOL 25 MG/1
25 TABLET ORAL
Status: DISCONTINUED | OUTPATIENT
Start: 2024-07-01 | End: 2024-07-03 | Stop reason: HOSPADM

## 2024-07-01 RX ORDER — LISINOPRIL 20 MG/1
40 TABLET ORAL DAILY
Status: DISCONTINUED | OUTPATIENT
Start: 2024-07-01 | End: 2024-07-01

## 2024-07-01 RX ORDER — ACETAMINOPHEN 10 MG/ML
1000 INJECTION, SOLUTION INTRAVENOUS EVERY 8 HOURS PRN
Status: DISCONTINUED | OUTPATIENT
Start: 2024-07-01 | End: 2024-07-03 | Stop reason: HOSPADM

## 2024-07-01 RX ORDER — LEVOTHYROXINE SODIUM ANHYDROUS 100 UG/5ML
60 INJECTION, POWDER, LYOPHILIZED, FOR SOLUTION INTRAVENOUS
Status: DISCONTINUED | OUTPATIENT
Start: 2024-07-01 | End: 2024-07-01

## 2024-07-01 RX ORDER — CETIRIZINE HYDROCHLORIDE 10 MG/1
10 TABLET ORAL EVERY EVENING
COMMUNITY

## 2024-07-01 RX ORDER — ONDANSETRON HYDROCHLORIDE 2 MG/ML
4 INJECTION, SOLUTION INTRAVENOUS ONCE
Status: COMPLETED | OUTPATIENT
Start: 2024-07-01 | End: 2024-07-01

## 2024-07-01 RX ORDER — ALLOPURINOL 100 MG/1
100 TABLET ORAL DAILY
Status: DISCONTINUED | OUTPATIENT
Start: 2024-07-01 | End: 2024-07-03 | Stop reason: HOSPADM

## 2024-07-01 RX ORDER — LANOLIN ALCOHOL/MO/W.PET/CERES
2000 CREAM (GRAM) TOPICAL EVERY OTHER DAY
COMMUNITY

## 2024-07-01 RX ORDER — HYDROMORPHONE HYDROCHLORIDE 1 MG/ML
0.6 INJECTION, SOLUTION INTRAMUSCULAR; INTRAVENOUS; SUBCUTANEOUS EVERY 4 HOURS PRN
Status: DISCONTINUED | OUTPATIENT
Start: 2024-07-01 | End: 2024-07-03 | Stop reason: HOSPADM

## 2024-07-01 RX ORDER — KETOROLAC TROMETHAMINE 15 MG/ML
15 INJECTION, SOLUTION INTRAMUSCULAR; INTRAVENOUS EVERY 6 HOURS PRN
Status: DISCONTINUED | OUTPATIENT
Start: 2024-07-01 | End: 2024-07-01

## 2024-07-01 RX ORDER — TAMSULOSIN HYDROCHLORIDE 0.4 MG/1
0.4 CAPSULE ORAL DAILY
Status: DISCONTINUED | OUTPATIENT
Start: 2024-07-01 | End: 2024-07-03 | Stop reason: HOSPADM

## 2024-07-01 SDOH — ECONOMIC STABILITY: TRANSPORTATION INSECURITY: IN THE PAST 12 MONTHS, HAS LACK OF TRANSPORTATION KEPT YOU FROM MEDICAL APPOINTMENTS OR FROM GETTING MEDICATIONS?: NO

## 2024-07-01 SDOH — SOCIAL STABILITY: SOCIAL INSECURITY: ARE YOU OR HAVE YOU BEEN THREATENED OR ABUSED PHYSICALLY, EMOTIONALLY, OR SEXUALLY BY ANYONE?: NO

## 2024-07-01 SDOH — SOCIAL STABILITY: SOCIAL INSECURITY: DO YOU FEEL ANYONE HAS EXPLOITED OR TAKEN ADVANTAGE OF YOU FINANCIALLY OR OF YOUR PERSONAL PROPERTY?: NO

## 2024-07-01 SDOH — SOCIAL STABILITY: SOCIAL INSECURITY: HAVE YOU HAD THOUGHTS OF HARMING ANYONE ELSE?: NO

## 2024-07-01 SDOH — ECONOMIC STABILITY: FOOD INSECURITY: WITHIN THE PAST 12 MONTHS, THE FOOD YOU BOUGHT JUST DIDN'T LAST AND YOU DIDN'T HAVE MONEY TO GET MORE.: NEVER TRUE

## 2024-07-01 SDOH — ECONOMIC STABILITY: GENERAL

## 2024-07-01 SDOH — SOCIAL STABILITY: SOCIAL INSECURITY: WERE YOU ABLE TO COMPLETE ALL THE BEHAVIORAL HEALTH SCREENINGS?: YES

## 2024-07-01 SDOH — ECONOMIC STABILITY: HOUSING INSECURITY
IN THE LAST 12 MONTHS, WAS THERE A TIME WHEN YOU DID NOT HAVE A STEADY PLACE TO SLEEP OR SLEPT IN A SHELTER (INCLUDING NOW)?: NO

## 2024-07-01 SDOH — ECONOMIC STABILITY: HOUSING INSECURITY: IN THE LAST 12 MONTHS, HOW MANY PLACES HAVE YOU LIVED?: 1

## 2024-07-01 SDOH — ECONOMIC STABILITY: FOOD INSECURITY: WITHIN THE PAST 12 MONTHS, YOU WORRIED THAT YOUR FOOD WOULD RUN OUT BEFORE YOU GOT MONEY TO BUY MORE.: NEVER TRUE

## 2024-07-01 SDOH — ECONOMIC STABILITY: HOUSING INSECURITY

## 2024-07-01 SDOH — ECONOMIC STABILITY: INCOME INSECURITY: IN THE LAST 12 MONTHS, WAS THERE A TIME WHEN YOU WERE NOT ABLE TO PAY THE MORTGAGE OR RENT ON TIME?: NO

## 2024-07-01 SDOH — ECONOMIC STABILITY: FOOD INSECURITY

## 2024-07-01 SDOH — ECONOMIC STABILITY: TRANSPORTATION INSECURITY

## 2024-07-01 SDOH — SOCIAL STABILITY: SOCIAL INSECURITY: HAVE YOU HAD ANY THOUGHTS OF HARMING ANYONE ELSE?: NO

## 2024-07-01 SDOH — SOCIAL STABILITY: SOCIAL INSECURITY: ARE THERE ANY APPARENT SIGNS OF INJURIES/BEHAVIORS THAT COULD BE RELATED TO ABUSE/NEGLECT?: NO

## 2024-07-01 SDOH — ECONOMIC STABILITY: HOUSING INSECURITY: IN THE PAST 12 MONTHS HAS THE ELECTRIC, GAS, OIL, OR WATER COMPANY THREATENED TO SHUT OFF SERVICES IN YOUR HOME?: NO

## 2024-07-01 SDOH — SOCIAL STABILITY: SOCIAL INSECURITY: HAS ANYONE EVER THREATENED TO HURT YOUR FAMILY OR YOUR PETS?: NO

## 2024-07-01 SDOH — ECONOMIC STABILITY: FOOD INSECURITY: WITHIN THE PAST 12 MONTHS, YOU WORRIED THAT YOUR FOOD WOULD RUN OUT BEFORE YOU GOT THE MONEY TO BUY MORE.: NEVER TRUE

## 2024-07-01 SDOH — ECONOMIC STABILITY: TRANSPORTATION INSECURITY
IN THE PAST 12 MONTHS, HAS LACK OF TRANSPORTATION KEPT YOU FROM MEETINGS, WORK, OR FROM GETTING THINGS NEEDED FOR DAILY LIVING?: NO

## 2024-07-01 SDOH — SOCIAL STABILITY: SOCIAL INSECURITY: ABUSE: ADULT

## 2024-07-01 SDOH — ECONOMIC STABILITY: HOUSING INSECURITY: IN THE LAST 12 MONTHS, WAS THERE A TIME WHEN YOU WERE NOT ABLE TO PAY THE MORTGAGE OR RENT ON TIME?: NO

## 2024-07-01 SDOH — SOCIAL STABILITY: SOCIAL INSECURITY: DOES ANYONE TRY TO KEEP YOU FROM HAVING/CONTACTING OTHER FRIENDS OR DOING THINGS OUTSIDE YOUR HOME?: NO

## 2024-07-01 SDOH — ECONOMIC STABILITY: FOOD INSECURITY: WITHIN THE PAST 12 MONTHS, THE FOOD YOU BOUGHT JUST DIDN’T LAST AND YOU DIDN’T HAVE MONEY TO GET MORE.: NEVER TRUE

## 2024-07-01 SDOH — SOCIAL STABILITY: SOCIAL INSECURITY: DO YOU FEEL UNSAFE GOING BACK TO THE PLACE WHERE YOU ARE LIVING?: NO

## 2024-07-01 SDOH — ECONOMIC STABILITY: TRANSPORTATION INSECURITY
IN THE PAST 12 MONTHS, HAS THE LACK OF TRANSPORTATION KEPT YOU FROM MEDICAL APPOINTMENTS OR FROM GETTING MEDICATIONS?: NO

## 2024-07-01 ASSESSMENT — LIFESTYLE VARIABLES
HOW MANY STANDARD DRINKS CONTAINING ALCOHOL DO YOU HAVE ON A TYPICAL DAY: PATIENT DECLINED
HOW OFTEN DO YOU HAVE A DRINK CONTAINING ALCOHOL: PATIENT DECLINED
SUBSTANCE_ABUSE_PAST_12_MONTHS: YES
PRESCIPTION_ABUSE_PAST_12_MONTHS: NO
AUDIT-C TOTAL SCORE: -1
SKIP TO QUESTIONS 9-10: 0
AUDIT-C TOTAL SCORE: -1
HOW OFTEN DO YOU HAVE 6 OR MORE DRINKS ON ONE OCCASION: PATIENT DECLINED

## 2024-07-01 ASSESSMENT — PAIN SCALES - GENERAL
PAINLEVEL_OUTOF10: 7
PAINLEVEL_OUTOF10: 0 - NO PAIN
PAINLEVEL_OUTOF10: 7
PAINLEVEL_OUTOF10: 7

## 2024-07-01 ASSESSMENT — ACTIVITIES OF DAILY LIVING (ADL)
ADEQUATE_TO_COMPLETE_ADL: YES
WALKS IN HOME: DEPENDENT
JUDGMENT_ADEQUATE_SAFELY_COMPLETE_DAILY_ACTIVITIES: YES
ASSISTIVE_DEVICE: WHEELCHAIR
PATIENT'S MEMORY ADEQUATE TO SAFELY COMPLETE DAILY ACTIVITIES?: YES
LACK_OF_TRANSPORTATION: NO
FEEDING YOURSELF: INDEPENDENT
TOILETING: NEEDS ASSISTANCE
DRESSING YOURSELF: NEEDS ASSISTANCE
BATHING: NEEDS ASSISTANCE
HEARING - LEFT EAR: DIFFICULTY WITH NOISE
LACK_OF_TRANSPORTATION: NO
GROOMING: NEEDS ASSISTANCE
HEARING - RIGHT EAR: DIFFICULTY WITH NOISE

## 2024-07-01 ASSESSMENT — ENCOUNTER SYMPTOMS
UNEXPECTED WEIGHT CHANGE: 1
SHORTNESS OF BREATH: 1
HEMATOLOGIC/LYMPHATIC NEGATIVE: 1
PSYCHIATRIC NEGATIVE: 1
FEVER: 1
MUSCULOSKELETAL NEGATIVE: 1
CHILLS: 1
NAUSEA: 0
VOMITING: 1
FATIGUE: 1
ABDOMINAL PAIN: 1
CONSTIPATION: 0
COUGH: 0
DYSURIA: 0
ANAL BLEEDING: 0
DIARRHEA: 0

## 2024-07-01 ASSESSMENT — PATIENT HEALTH QUESTIONNAIRE - PHQ9
SUM OF ALL RESPONSES TO PHQ9 QUESTIONS 1 & 2: 0
2. FEELING DOWN, DEPRESSED OR HOPELESS: NOT AT ALL
1. LITTLE INTEREST OR PLEASURE IN DOING THINGS: NOT AT ALL

## 2024-07-01 ASSESSMENT — COGNITIVE AND FUNCTIONAL STATUS - GENERAL
MOVING FROM LYING ON BACK TO SITTING ON SIDE OF FLAT BED WITH BEDRAILS: A LITTLE
CLIMB 3 TO 5 STEPS WITH RAILING: A LOT
TURNING FROM BACK TO SIDE WHILE IN FLAT BAD: A LITTLE
MOBILITY SCORE: 17
DRESSING REGULAR LOWER BODY CLOTHING: A LITTLE
TOILETING: A LITTLE
PATIENT BASELINE BEDBOUND: YES
STANDING UP FROM CHAIR USING ARMS: A LITTLE
DRESSING REGULAR UPPER BODY CLOTHING: A LITTLE
TURNING FROM BACK TO SIDE WHILE IN FLAT BAD: A LITTLE
TOILETING: A LITTLE
DAILY ACTIVITIY SCORE: 20
WALKING IN HOSPITAL ROOM: A LITTLE
DAILY ACTIVITIY SCORE: 21
MOVING TO AND FROM BED TO CHAIR: A LITTLE
MOBILITY SCORE: 19
CLIMB 3 TO 5 STEPS WITH RAILING: A LOT
WALKING IN HOSPITAL ROOM: A LITTLE
DRESSING REGULAR UPPER BODY CLOTHING: A LITTLE
MOVING TO AND FROM BED TO CHAIR: A LITTLE
HELP NEEDED FOR BATHING: A LITTLE
DRESSING REGULAR LOWER BODY CLOTHING: A LITTLE

## 2024-07-01 ASSESSMENT — COLUMBIA-SUICIDE SEVERITY RATING SCALE - C-SSRS
1. IN THE PAST MONTH, HAVE YOU WISHED YOU WERE DEAD OR WISHED YOU COULD GO TO SLEEP AND NOT WAKE UP?: NO
6. HAVE YOU EVER DONE ANYTHING, STARTED TO DO ANYTHING, OR PREPARED TO DO ANYTHING TO END YOUR LIFE?: NO
2. HAVE YOU ACTUALLY HAD ANY THOUGHTS OF KILLING YOURSELF?: NO

## 2024-07-01 ASSESSMENT — SOCIAL DETERMINANTS OF HEALTH (SDOH): IN THE PAST 12 MONTHS, HAS THE ELECTRIC, GAS, OIL, OR WATER COMPANY THREATENED TO SHUT OFF SERVICE IN YOUR HOME?: NO

## 2024-07-01 ASSESSMENT — PAIN - FUNCTIONAL ASSESSMENT
PAIN_FUNCTIONAL_ASSESSMENT: 0-10
PAIN_FUNCTIONAL_ASSESSMENT: 0-10

## 2024-07-01 ASSESSMENT — PAIN DESCRIPTION - LOCATION: LOCATION: ABDOMEN

## 2024-07-01 NOTE — ED PROVIDER NOTES
HPI   Chief Complaint   Patient presents with    Abdominal Pain    Nausea    Vomiting    Shortness of Breath       Patient is a 74-year-old male presenting for generalized fatigue and malaise.  Has had worsening nausea and nonbloody nonbilious emesis.  Last bowel movement was 2 days ago and normal.  The patient denies any urinary habits.  Has had mild cough but no significant congestion or fevers.  Feels better seated up                          Hammond Coma Scale Score: 15                     Patient History   Past Medical History:   Diagnosis Date    Arthritis     Disease of thyroid gland     History of transfusion     Hypertension     Personal history of diseases of the blood and blood-forming organs and certain disorders involving the immune mechanism     History of hemorrhagic diathesis    Personal history of other diseases of the circulatory system     History of hypertension    Personal history of other diseases of the circulatory system 12/22/2022    History of aortic valve stenosis    Personal history of other diseases of the circulatory system 10/10/2021    History of chronic atrial fibrillation    Personal history of other diseases of the musculoskeletal system and connective tissue     History of spinal stenosis    Personal history of other diseases of the musculoskeletal system and connective tissue     History of arthritis    Personal history of other diseases of the nervous system and sense organs     History of sleep apnea     Past Surgical History:   Procedure Laterality Date    ABDOMINAL SURGERY      CARDIAC SURGERY      EYE SURGERY      FRACTURE SURGERY      OTHER SURGICAL HISTORY  08/08/2019    PICC line insertion    OTHER SURGICAL HISTORY  08/08/2019    Leg surgery    OTHER SURGICAL HISTORY  07/07/2021    Tonsillectomy with adenoidectomy    TONSILLECTOMY      VASCULAR SURGERY       Family History   Problem Relation Name Age of Onset    Hypertension Mother      Breast cancer Mother      Kidney  disease Father          CKD    Peripheral vascular disease Father      Cancer Sibling       Social History     Tobacco Use    Smoking status: Never    Smokeless tobacco: Never   Vaping Use    Vaping status: Never Used   Substance Use Topics    Alcohol use: Yes     Alcohol/week: 21.0 standard drinks of alcohol     Types: 21 Standard drinks or equivalent per week    Drug use: Yes     Types: Marijuana     Comment: gummies       Physical Exam   ED Triage Vitals [07/01/24 0645]   Temperature Heart Rate Respirations BP   36.3 °C (97.3 °F) 78 (!) 22 176/87      Pulse Ox Temp src Heart Rate Source Patient Position   (!) 93 % -- -- --      BP Location FiO2 (%)     -- --       Physical Exam  Constitutional:       General: He is not in acute distress.     Appearance: He is not toxic-appearing.   Cardiovascular:      Rate and Rhythm: Normal rate and regular rhythm.   Pulmonary:      Breath sounds: Normal breath sounds.   Abdominal:      Comments: Reducible umbilical hernia, diffuse distention across the abdomen but otherwise soft.  The patient has mild diffuse tenderness without rebound or guarding.  No CVA tenderness present.   Neurological:      General: No focal deficit present.      Mental Status: He is alert and oriented to person, place, and time.         ED Course & MDM   ED Course as of 07/01/24 1043   Mon Jul 01, 2024   0730 EKG interpreted as atrial fibrillation at a rate of 70 bpm, leftward axis, no ST elevations or depressions or T wave inversions/ischemia, QTc of 468. [AV]      ED Course User Index  [AV] Jorge Atwood MD         Diagnoses as of 07/01/24 1043   Other complete intestinal obstruction (Multi)       Medical Decision Making  Patient is a 74-year-old male presenting for generalized fatigue malaise, nausea and vomiting.  History and physical examination are most concerning for intra-abdominal process including but not limited to obstruction.  Patient had new oxygen requirement but I think this is from  the intra-abdominal distention given the clear lung sounds.  The patient however did undergo a CT chest abdomen pelvis which had appropriate lung parenchyma however did have high-grade obstruction around the area of the umbilicus.  The hernia was able to be reduced at bedside and has normal lactates unless concerned about strangulation however could be the reason for obstruction.  I spoke with the general surgeon who recommended decompression with an NG tube and hospitalization for further serial abdominal exams and potential surgical intervention.  Patient did have mildly elevated cardiac enzyme testing including BNP and troponin which I think is more strain from the increased pressure and fluid retention.        Procedure  Procedures     Jorge Atwood MD  07/01/24 1047

## 2024-07-01 NOTE — H&P
Internal Medicine - History and Physical Note      Patient: Aly Mccarty, Age: 74 y.o., SEX: male , MRN:[id number], ROOM:110/110-A,  Code: DNR and No Intubation   Admitted On: 7/1/2024   Admitting Dx: Other complete intestinal obstruction (Multi) [K56.691]  PCP: Luis Eduardo Kay DO        Attending: Ced Duke MD        Chief Complaint   Patient: Aly Mccarty is a 74 y.o. male who presented to the hospital for   Chief Complaint   Patient presents with    Abdominal Pain    Nausea    Vomiting    Shortness of Breath       HPI   Aly Mccarty is a 74 y.o. year old male  patient with pmhx CKD stage 3, Atrial fibrillation on Xarelto, Chronic UTI, Hypothyroidism, osteomyelitis with resulting septicemia, and pancytopenia arrived in the ED for abdominal pain. Pain started 3 days prior and is described as achy, intermittent, and localized to the lower abdominal region 7/10. No aggravating or alleviating symptoms. On the morning of admission, patient experienced an episode of sudden emesis without nausea. Patient did not recall seeing blood in the emesis. Patient has not taken Xarelto since day prior to admission. Last bowel movement occurred yesterday, and stool was described as loose. Patient experienced subjective fever, chills, and fatigue the morning of admission. Patient received adequate pain control in the ED and was resting comfortably.    ROS  Review of Systems   Constitutional:  Positive for chills, fatigue, fever and unexpected weight change.        Gained 12 pounds in 2 weeks     HENT: Negative.     Respiratory:  Positive for shortness of breath. Negative for cough.    Cardiovascular:  Positive for leg swelling. Negative for chest pain.   Gastrointestinal:  Positive for abdominal pain and vomiting. Negative for anal bleeding, constipation, diarrhea and nausea.   Genitourinary:  Negative for dysuria.   Musculoskeletal: Negative.    Skin: Negative.    Hematological: Negative.     Psychiatric/Behavioral: Negative.       12 Point ROS negative unless otherwise specified above.     ED COURSE:   VS: Temperature , BP , heart rate , respiration , O2 sat    Labs  - CBC: WBC 11.6, Hgb 11.9, Hematocrit 38.8, Platelet 101  - BMP:Glu 126, Cr 1.42  - LFTs: Total Bili 1.6, Direct Bili  0.4  - Lactate: 1.1    Imaging:  CTChest A/P: biletral pleural effusions, s/p AV repair, high grade distal SBO dilated loop of small bowel in a left periumbilical hernia, hepatic and renal cysts  CXR s/p NGT placement: NG tube projects over the proximal stomach     Interventions:   patient received Morphine and Zofran, which relieved his symptoms. Furthermore, patient was placed on an NG tube, with position confirmed by CXR.    Past Medical History: Stage 3 CKD, Osteomyelitis and septicemia, Pancytopenia, A.fib on Xarelto, Hypothyroidism, Chronic UTI, and obesity  Past Surgical History: Peg Tube Placement 5 years ago  Allergies: Levofloxacin  Family History: Breast cancer in mother, ACS in brother  Home Meds: Per EMR    Social History:  - Coming from   - Tobacco:   - Alcohol:   - Illicit Drug:     HealthCare Providers:  - PCP: Luis Eduardo Kay DO     Code Status: DNR and No Intubation     Emergency contact: Extended Emergency Contact Information  Primary Emergency Contact: Sole Mccarty  Address: [address] [address]  Mobile Phone: [phone]  Relation: Spouse     Past Medical History     Past Medical History:   Diagnosis Date    Arthritis     Disease of thyroid gland     History of transfusion     Hypertension     Personal history of diseases of the blood and blood-forming organs and certain disorders involving the immune mechanism     History of hemorrhagic diathesis    Personal history of other diseases of the circulatory system     History of hypertension    Personal history of other diseases of the circulatory system 12/22/2022    History of aortic valve stenosis     Personal history of other diseases of the circulatory system 10/10/2021    History of chronic atrial fibrillation    Personal history of other diseases of the musculoskeletal system and connective tissue     History of spinal stenosis    Personal history of other diseases of the musculoskeletal system and connective tissue     History of arthritis    Personal history of other diseases of the nervous system and sense organs     History of sleep apnea        Surgical History     Past Surgical History:   Procedure Laterality Date    ABDOMINAL SURGERY      CARDIAC SURGERY      EYE SURGERY      FRACTURE SURGERY      OTHER SURGICAL HISTORY  08/08/2019    PICC line insertion    OTHER SURGICAL HISTORY  08/08/2019    Leg surgery    OTHER SURGICAL HISTORY  07/07/2021    Tonsillectomy with adenoidectomy    TONSILLECTOMY      VASCULAR SURGERY         Family History     Family History   Problem Relation Name Age of Onset    Hypertension Mother      Breast cancer Mother      Kidney disease Father          CKD    Peripheral vascular disease Father      Cancer Sibling         Social History     Social History     Socioeconomic History    Marital status:      Spouse name: Not on file    Number of children: Not on file    Years of education: Not on file    Highest education level: Not on file   Occupational History    Not on file   Tobacco Use    Smoking status: Never    Smokeless tobacco: Never   Vaping Use    Vaping status: Never Used   Substance and Sexual Activity    Alcohol use: Yes     Alcohol/week: 21.0 standard drinks of alcohol     Types: 21 Standard drinks or equivalent per week    Drug use: Yes     Types: Marijuana     Comment: gummies    Sexual activity: Defer   Other Topics Concern    Not on file   Social History Narrative    Not on file     Social Determinants of Health     Financial Resource Strain: Low Risk  (7/1/2024)    Overall Financial Resource Strain (CARDIA)     Difficulty of Paying Living Expenses: Not  hard at all   Food Insecurity: Not on file   Transportation Needs: No Transportation Needs (7/1/2024)    PRAPARE - Transportation     Lack of Transportation (Medical): No     Lack of Transportation (Non-Medical): No   Physical Activity: Not on file   Stress: Not on file   Social Connections: Not on file   Intimate Partner Violence: Not on file   Housing Stability: Low Risk  (7/1/2024)    Housing Stability Vital Sign     Unable to Pay for Housing in the Last Year: No     Number of Places Lived in the Last Year: 1     Unstable Housing in the Last Year: No       Tobacco Use: Low Risk  (7/1/2024)    Patient History     Smoking Tobacco Use: Never     Smokeless Tobacco Use: Never     Passive Exposure: Not on file        Social History     Substance and Sexual Activity   Alcohol Use Yes    Alcohol/week: 21.0 standard drinks of alcohol    Types: 21 Standard drinks or equivalent per week        Allergies     Allergies   Allergen Reactions    Levofloxacin Angioedema and Hives          Meds    Scheduled medications  [Held by provider] allopurinol, 100 mg, oral, Daily  [Held by provider] B complex-vitamin C-folic acid, 1 capsule, oral, Daily  [Held by provider] carvedilol, 25 mg, oral, BID  [Held by provider] cyanocobalamin, 2,000 mcg, oral, Every other day  [Held by provider] folic acid, 1 mg, oral, Daily  [Held by provider] hydrALAZINE, 50 mg, oral, BID  levothyroxine, 60 mcg, intravenous, q24h VALERIO  [Held by provider] levothyroxine, 88 mcg, oral, Daily  [Held by provider] loratadine, 10 mg, oral, Daily  [Held by provider] phenazopyridine, 200 mg, oral, TID  [Held by provider] rivaroxaban, 20 mg, oral, Daily with evening meal  [Held by provider] tamsulosin, 0.4 mg, oral, Daily  [Held by provider] thiamine, 100 mg, oral, Daily      Continuous medications  lactated Ringer's, 100 mL/hr, Last Rate: 100 mL/hr (07/01/24 1230)      PRN medications  PRN medications: acetaminophen, HYDROmorphone, HYDROmorphone, HYDROmorphone,  "ondansetron **OR** ondansetron     Objective    Physical Exam  Constitutional:       Appearance: Normal appearance. He is obese.   HENT:      Head: Normocephalic and atraumatic.      Right Ear: External ear normal.      Left Ear: External ear normal.      Nose: Nose normal. No congestion or rhinorrhea.   Eyes:      Extraocular Movements: Extraocular movements intact.      Conjunctiva/sclera: Conjunctivae normal.      Pupils: Pupils are equal, round, and reactive to light.   Cardiovascular:      Rate and Rhythm: Tachycardia present. Rhythm irregular.      Pulses: Normal pulses.      Heart sounds: Normal heart sounds.   Pulmonary:      Effort: Pulmonary effort is normal. No respiratory distress.      Breath sounds: Normal breath sounds. No stridor. No rhonchi.   Abdominal:      Palpations: Abdomen is soft.      Tenderness: There is no abdominal tenderness. There is no guarding or rebound.      Hernia: A hernia is present.   Musculoskeletal:         General: Signs of injury present.      Cervical back: Normal range of motion. No rigidity or tenderness.      Right lower leg: Edema present.      Left lower leg: Edema present.      Comments: Patient had injured his right ankle and presented to the ED with a brace   Skin:     General: Skin is warm and dry.   Neurological:      General: No focal deficit present.      Mental Status: He is alert and oriented to person, place, and time. Mental status is at baseline.   Psychiatric:         Mood and Affect: Mood normal.         Behavior: Behavior normal.         Thought Content: Thought content normal.         Judgment: Judgment normal.          Visit Vitals  /78 (BP Location: Right arm, Patient Position: Lying)   Pulse 52   Temp 36.1 °C (97 °F) (Temporal)   Resp 18   Ht 1.88 m (6' 2\")   Wt (!) 159 kg (350 lb)   SpO2 97%   BMI 44.94 kg/m²   Smoking Status Never   BSA 2.88 m²          Intake/Output Summary (Last 24 hours) at 7/1/2024 7187  Last data filed at 7/1/2024 " "1317  Gross per 24 hour   Intake --   Output 400 ml   Net -400 ml             Labs:   Results from last 72 hours   Lab Units 07/01/24  0659   SODIUM mmol/L 139   POTASSIUM mmol/L 3.9   CHLORIDE mmol/L 104   CO2 mmol/L 23   BUN mg/dL 20   CREATININE mg/dL 1.42*   GLUCOSE mg/dL 126*   CALCIUM mg/dL 9.5   ANION GAP mmol/L 16   EGFR mL/min/1.73m*2 52*      Results from last 72 hours   Lab Units 07/01/24  0659   WBC AUTO x10*3/uL 11.6*   HEMOGLOBIN g/dL 11.9*   HEMATOCRIT % 38.8*   PLATELETS AUTO x10*3/uL 101*   NEUTROS PCT AUTO % 91.2   LYMPHS PCT AUTO % 3.7   MONOS PCT AUTO % 4.1   EOS PCT AUTO % 0.1      Lab Results   Component Value Date    CALCIUM 9.5 07/01/2024    PHOS 3.7 05/02/2024      Lab Results   Component Value Date    CRP CANCELED 08/24/2023       [unfilled]     Micro/ID:   No results found for the last 90 days.    Lab Results   Component Value Date    URINECULTURE (A) 06/07/2024     Multiple organisms present, probable contamination. Repeat culture if clinically indicated.                    No lab exists for component: \"AGALPCRNB\"       Images    XR abdomen 1 view  Narrative: Interpreted By:  Emeterio Plascencia,   STUDY:  XR ABDOMEN 1 VIEW;  7/1/2024 12:26 pm      INDICATION:  Signs/Symptoms:NG tube placement.      COMPARISON:  November 23, 2022 chest radiographs. July 1, 2024 CT chest abdomen  and pelvis      ACCESSION NUMBER(S):  [id number]      ORDERING CLINICIAN:  VIVEK PERRY      FINDINGS:  Tip of the NG tube projects over the left proximal stomach to the  left of midline. Nonobstructive bowel gas pattern. Limited evaluation  of pneumoperitoneum on supine imaging, however no gross evidence of  free air is noted.      Similar appearance of the chest. Right-sided effusion noted on same  day CT better evaluated by CT. Aortic prosthesis is similar.      Osseous structures demonstrate no acute bony changes.      Impression: 1.  NG tube projects over the proximal stomach.      MACRO:  None    "   Signed by: Emeterio Placsencia 7/1/2024 12:35 PM  Dictation workstation:   [id number]  CT chest abdomen pelvis w IV contrast  Narrative: Interpreted By:  Jailyn Aleman,   STUDY:  CT CHEST ABDOMEN PELVIS W IV CONTRAST;  7/1/2024 7:51 am      INDICATION:  Signs/Symptoms:New hypoxia with worsening abdominal distention.      COMPARISON:  CT abdomen and pelvis 07/11/2018      ACCESSION NUMBER(S):  [id number]      ORDERING CLINICIAN:  GUERA FITZGERALD      TECHNIQUE:  CT of the chest, abdomen, and pelvis was performed.  Contiguous axial images were obtained at 3 mm slice thickness through  the chest, abdomen and pelvis. Coronal and sagittal reconstructions  at 3 mm slice thickness were performed.  75 mL Omnipaque 350      FINDINGS:  CHEST:      LUNG/PLEURA/LARGE AIRWAYS:  There are small bilateral pleural effusions, right greater than left.  There is no pulmonary nodule.      VESSELS:  There is atherosclerotic calcification of the thoracic aorta,  particularly the aortic arch.      HEART:  Status post transcatheter aortic valve repair (TAVR).  There is calcification of the mitral annulus.  There are mild coronary artery calcifications.      MEDIASTINUM AND SCOTT:  There is no hilar or mediastinal adenopathy.      CHEST WALL AND LOWER NECK:  The chest wall is unremarkable. There is no abnormality of the lower  neck.      ABDOMEN:      LIVER:  There is a benign simple left hepatic cyst.      BILE DUCTS:  There is no intrahepatic, common hepatic or common bile ductal  dilatation.      GALLBLADDER:  There is a small gallstone in the proximal body of an otherwise  unremarkable gallbladder.      PANCREAS:  There is no abnormality of the pancreas.      SPLEEN:  The spleen is unremarkable. There is no splenic mass. There is no  splenomegaly      ADRENAL GLANDS:  The adrenal glands are unremarkable.      KIDNEYS AND URETERS:.  The kidneys function symmetrically.  There are benign simple subcentimeter upper pole left renal  cyst.  There are bilateral punctate nonobstructing intrarenal calculi.          PELVIS:      BLADDER:  The bladder is unremarkable.      REPRODUCTIVE ORGANS:  There is no abnormality of the reproductive organs.      BOWEL:  There is moderate gastric distention.  There are markedly dilated loops of small bowel.  There is a collapsed distal small bowel and collapsed colon. Findings  are consistent with a high-grade distal small-bowel obstruction.  There is either fluid or a distended loop of bowel in a left  periumbilical hernia. There does not appear to be a dilated afferent  loop or collapsed efferent loop but it would be important know if  this is reversible. This could be incarcerated.      VESSELS:  The abdominal and pelvic vessels are unremarkable.      PERITONEUM/RETROPERITONEUM/LYMPH NODES:  There is no retroperitoneal or pelvic adenopathy.  There is a small  crescent of ascites superior to the spleen.      ABDOMINAL WALL:  There is reticulation of the subcutaneous fat encircling the abdomen  and pelvis and dependent edema in the subcutaneous fat along the back  back. This could represent anasarca/hypoproteinemia.      BONES AND SOFT TISSUES:  There is no acute osseous finding.      There is a 2.5 cm coarse eggshell calcification centrally in the  mesentery consistent with benign fat necrosis. There is a 4.4 x 2.6  cm fatty mass with surrounding serpentine area of soft tissue density  unchanged compared to 07/11/2018 consistent with fat necrosis.      Impression: CHEST:  1.  Small bilateral pleural effusions, right greater than left.  2. Status post transcatheter aortic valve repair.      ABDOMEN AND PELVIS:  1.  High-grade distal small-bowel obstruction. There is either a  pocket of ascites or a dilated loop of small bowel in a left  periumbilical hernia. It would be important know if this is reducible  or incarcerated. This could represent the point of obstruction.  However, there is no definite dilated  afferent loop or collapsed  efferent loop.  2. Benign hepatic and renal cysts.  3. Bilateral nonobstructing intrarenal calculi.  4. Gallstone in an otherwise unremarkable gallbladder.  5. Anasarca/hypoproteinemia.      MACRO:  None      Signed by: Jailyn Aleman 7/1/2024 10:15 AM  Dictation workstation:   [id number]  ECG 12 lead  Atrial fibrillation  Left axis deviation  Incomplete right bundle branch block  Septal infarct (cited on or before 04-MAY-2020)  Abnormal ECG  When compared with ECG of 23-NOV-2022 05:35,  Questionable change in initial forces of Septal leads       Encounter Date: 07/01/24   ECG 12 lead   Result Value    Ventricular Rate 70    QRS Duration 118    QT Interval 434    QTC Calculation(Bazett) 468    R Axis -45    T Axis 88    QRS Count 11    Q Onset 223    T Offset 440    QTC Fredericia 457    Narrative    Atrial fibrillation  Left axis deviation  Incomplete right bundle branch block  Septal infarct (cited on or before 04-MAY-2020)  Abnormal ECG  When compared with ECG of 23-NOV-2022 05:35,  Questionable change in initial forces of Septal leads      Encounter Date: 07/01/24   ECG 12 lead   Result Value    Ventricular Rate 70    QRS Duration 118    QT Interval 434    QTC Calculation(Bazett) 468    R Axis -45    T Axis 88    QRS Count 11    Q Onset 223    T Offset 440    QTC Fredericia 457    Narrative    Atrial fibrillation  Left axis deviation  Incomplete right bundle branch block  Septal infarct (cited on or before 04-MAY-2020)  Abnormal ECG  When compared with ECG of 23-NOV-2022 05:35,  Questionable change in initial forces of Septal leads        [unfilled]     [unfilled]     Assessment and Plan    Aly Mccarty is a 74 y.o. male admitted on 7/1/2024 with pmhx CKD stage 3, Atrial fibrillation on Xarelto, Chronic UTI, Hypothyroidism, osteomyelitis and septicemia, and pancytopenia arrived in the ED for abdominal pain.     ACUTE MEDICAL ISSUES:    #Abdominal Pain secondary to Small Bowel  Obstruction  #Vomiting  -Patient described localized bilateral lower abdominal pain 7/10 with one episode of forceful vomiting the morning of admission  -On physical examination, patient had a paraumbilical, non-tender, hernia that was reducible.  -CT Abd/Pelvis revealed dilated loops of small bowel and gastric distension accompanied by periumbilical hernia  - Tx: Patient placed on maintenance fluid regiment LR 100ml/hr,  Pain control with Dilaudid (Morphine was switched due to hx of CKD), Zofran 4mg  -Surgery Consult was placed   - Supportive: NPO diet plan    #Elevated Troponins  -Troponin level of 40; Second Troponin pending  : : Likely due to demand ischemia  -    #Hyperbilirubinemia  -Elevated Total Bilirubin 1.6, Direct Bilirubin 0.4  : : Likely in the setting of SBO  -Monitor with daily CMPs    CHRONIC MEDICAL ISSUES:    #Atrial fibrillation - seen on EKG, Rate control with coreg and AC with Xarelto - HOLD 2/2 NPO and possible surgery  #Gout: Allopurinol on HOLD 2/2 NPO  #CKDIII - Nephrocaps on hold 2/2 NPO. Cr of 1.42 (Baseline 1.8-2.2)  # Hypovitaminosis: Cynacobalmin, Folic Acid, Pyridium, Thiamine on hold 2/2 to NPO  #Hypothyroidism: Levothyroxine on hold 2/2 to NPO  #HTN- Most recent /78, Hydralazine on hold 2/2 to NPO    Fluids: Continuous LR  Electrolytes: Replete  Nutrition: NPO  Antimicrobials: None indicated  DVT ppx: Held 2/2 to possible surgery  GI ppx: None  Catheter: None  Lines: 18 G PIV in L upper arm  Emergency Contact: Extended Emergency Contact Information  Primary Emergency Contact: Sole Mccarty  Address: [address] [address]  Mobile Phone: [phone]  Relation: Spouse   Code: DNR and No Intubation     Disposition: ELOS >2 days    Mark Senior DO  Internal Medicine, PGY-1  07/01/24 at 2:52 PM

## 2024-07-01 NOTE — PROGRESS NOTES
Pharmacy Medication History Review    Aly Mccarty is a 74 y.o. male admitted for Other complete intestinal obstruction (Multi). Pharmacy reviewed the patient's [id number] medications and allergies for accuracy.    The list below reflectives the updated PTA list. Please review each medication in order reconciliation for additional clarification and justification.   Unable to assess medications.  Left message for spouse who is aware of medications.  Waiting for call back.     The list below reflectives the updated allergy list. Please review each documented allergy for additional clarification and justification.  Allergies  Reviewed by Dayanna Bradley RN on 7/1/2024        Severity Reactions Comments    Levofloxacin Not Specified Angioedema, Hives             Below are additional concerns with the patient's PTA list.   Unable to assess medications.  Left message for spouse who is aware of medications.  Waiting for call back.     Herlinda Amador

## 2024-07-01 NOTE — PROGRESS NOTES
07/01/24 0920   Discharge Planning   Living Arrangements Spouse/significant other   Support Systems Spouse/significant other   Assistance Needed A&OX3; needs assist for ADLs - wheelchair primarily and transfers self; doesn't drive; room air baseline - currently 2L NC with CPAP at HS   Type of Residence Private residence   Number of Stairs to Enter Residence 0   Number of Stairs Within Residence 0   Do you have animals or pets at home? Yes   Type of Animals or Pets 1 dog 2 cats   Who is requesting discharge planning? Provider   Patient expects to be discharged to: TBD pending GI workup   Does the patient need discharge transport arranged? No   Financial Resource Strain   How hard is it for you to pay for the very basics like food, housing, medical care, and heating? Not hard   Housing Stability   In the last 12 months, was there a time when you were not able to pay the mortgage or rent on time? N   In the last 12 months, how many places have you lived? 1   In the last 12 months, was there a time when you did not have a steady place to sleep or slept in a shelter (including now)? N   Transportation Needs   In the past 12 months, has lack of transportation kept you from medical appointments or from getting medications? no   In the past 12 months, has lack of transportation kept you from meetings, work, or from getting things needed for daily living? No     07/01/2024 0923am  Spoke with patient and spouse bedside in ED.

## 2024-07-01 NOTE — CONSULTS
General Surgery History and Physical    Referring Provider:  DO Dr. Leilani Newman    Chief Complaint:  Small bowel obstruction    History of Present Illness:  This is a 74 y.o. male who presents with concerns for small bowel obstruction.  He reports that he has issues with chronic urinary tract infections and has been on antibiotics for about a week.  He reports previous episodes of severe sepsis, without any source.  However, he began having abdominal distention and some pain last night.  This morning it continued, and he had vomiting.  He reports that the vomiting was nonbloody and nonbilious.  He reports that he noticed his chronic umbilical hernia was protruding, he was able to reduce it.  Reports that it was further reduced in the emergency department upon presentation.  He reports that his abdominal pain has completely resolved since admission in the emergency department.  He denies any further episodes of vomiting and has had no nausea.  He also has been worked up for cirrhosis in the past, but currently is treated as thrombocytopenia of alcohol.      Past Medical History:  Arthritis  Hypothyroidism  Hypertension  Coronary artery disease  Heart failure  Aortic valve stenosis  Atrial fibrillation  Spinal stenosis  Sleep apnea  Morbid obesity  Osteomyelitis  Chronic urinary tract infections  Anemia  Thrombocytopenia    Past Surgical History:  Gastrostomy tube placement and removal of  Tonsillectomy and adenoidectomy  TAVR    Medications:  Allopurinol  Carvedilol  Multivitamins  Levothyroxine  Lisinopril  Tamsulosin  Xarelto    Allergies:  Levofloxacin    Family History:  Hypertension  Breast cancer  Kidney disease  Peripheral vascular disease  Unknown cancer    Social History:  .  Retired.  Denies tobacco.  Smokes marijuana.  Drinks 4 alcoholic beverages a day.       Review of Systems:  A complete 12 point review of systems was performed and is negative except as noted in the history of present  illness.      Vital Signs:  Vitals:    07/01/24 1158   BP: 160/78   Pulse: 52   Resp: 18   Temp: 36.1 °C (97 °F)   SpO2: 97%          Physical Exam:  General: No acute distress. Sitting up in bed.   Neuro: Alert and oriented ×3. Follows commands.  Head: Atraumatic  Eyes: Pupils equal reactive to light. Extraocular motions intact.  Ears: Hears normal speaking voice.  Mouth, Nose, Throat: Mucous membranes moist.  Normal dentition.  Neck: Supple. No appreciable masses.  Chest: No crepitus.  No appreciable scars.  Heart: Regular rate and rhythm.  Lung: Clear to auscultation bilaterally.  Vascular: No carotid bruits.  Palpable radial pulses bilaterally.  Abdomen: Soft. Nondistended. Nontender.  Reducible umbilical hernia.  Musculoskeletal: Moves all extremities.  Normal range of motion.  Lymphatic: No palpable lymph nodes.  Skin: No rashes or lesions.  Psychological: Normal affect      Laboratory Values:  CBC:   Lab Results   Component Value Date    WBC 11.6 (H) 07/01/2024    RBC 3.48 (L) 07/01/2024    HGB 11.9 (L) 07/01/2024    HCT 38.8 (L) 07/01/2024     (L) 07/01/2024       RFP:   Lab Results   Component Value Date     07/01/2024    K 3.9 07/01/2024     07/01/2024    CO2 23 07/01/2024    BUN 20 07/01/2024    CREATININE 1.42 (H) 07/01/2024    CALCIUM 9.5 07/01/2024        LFTs:   Lab Results   Component Value Date    PROT 7.4 07/01/2024    ALBUMIN 4.1 07/01/2024    BILITOT 1.6 (H) 07/01/2024    BILIDIR 0.4 (H) 07/01/2024    ALKPHOS 124 07/01/2024    AST 28 07/01/2024    ALT 29 07/01/2024            Imaging:  I have personally reviewed the images and the radiologist's report.  CT abdomen pelvis: Mild dilation of the small bowel      Assessment:  This is a 74 y.o. male who presents with concerns for small bowel obstruction.  Clinically, he does not currently have evidence of a small bowel obstruction.  We discussed that he probably had a partial obstruction from the umbilical hernia, which was reduced.   He no longer has any symptoms.  We discussed a small bowel follow-through through the NG tube tomorrow morning.  If that looks fine, NG tube can be removed and he can be started on a diet.  We discussed that at some point he probably should have umbilical hernia repair, but it does not appear that it is an emergency.  We discussed that his severe medical comorbidities will complicate any hernia repair.      Plan:  -- Small bowel follow-through tomorrow  -- NG tube to low continuous suction today with frequent flushes  -- Likely will have outpatient discussions of umbilical hernia repair      Octavio Araujo MD  General Surgery  Office: [phone]  Fax:     [phone]  4:11 PM   07/01/24

## 2024-07-02 ENCOUNTER — APPOINTMENT (OUTPATIENT)
Dept: RADIOLOGY | Facility: HOSPITAL | Age: 74
End: 2024-07-02
Payer: MEDICARE

## 2024-07-02 LAB
ALBUMIN SERPL BCP-MCNC: 3.5 G/DL (ref 3.4–5)
ALP SERPL-CCNC: 97 U/L (ref 33–136)
ALT SERPL W P-5'-P-CCNC: 20 U/L (ref 10–52)
ANION GAP SERPL CALC-SCNC: 10 MMOL/L (ref 10–20)
AST SERPL W P-5'-P-CCNC: 13 U/L (ref 9–39)
BILIRUB SERPL-MCNC: 0.9 MG/DL (ref 0–1.2)
BUN SERPL-MCNC: 18 MG/DL (ref 6–23)
CALCIUM SERPL-MCNC: 8.5 MG/DL (ref 8.6–10.3)
CHLORIDE SERPL-SCNC: 107 MMOL/L (ref 98–107)
CO2 SERPL-SCNC: 31 MMOL/L (ref 21–32)
CREAT SERPL-MCNC: 1.25 MG/DL (ref 0.5–1.3)
EGFRCR SERPLBLD CKD-EPI 2021: 61 ML/MIN/1.73M*2
ERYTHROCYTE [DISTWIDTH] IN BLOOD BY AUTOMATED COUNT: 14 % (ref 11.5–14.5)
GLUCOSE SERPL-MCNC: 89 MG/DL (ref 74–99)
HCT VFR BLD AUTO: 33.3 % (ref 41–52)
HGB BLD-MCNC: 10 G/DL (ref 13.5–17.5)
MAGNESIUM SERPL-MCNC: 1.93 MG/DL (ref 1.6–2.4)
MCH RBC QN AUTO: 34.2 PG (ref 26–34)
MCHC RBC AUTO-ENTMCNC: 30 G/DL (ref 32–36)
MCV RBC AUTO: 114 FL (ref 80–100)
NRBC BLD-RTO: 0 /100 WBCS (ref 0–0)
PHOSPHATE SERPL-MCNC: 3.7 MG/DL (ref 2.5–4.9)
PLATELET # BLD AUTO: 93 X10*3/UL (ref 150–450)
POTASSIUM SERPL-SCNC: 3.6 MMOL/L (ref 3.5–5.3)
PROT SERPL-MCNC: 6.3 G/DL (ref 6.4–8.2)
RBC # BLD AUTO: 2.92 X10*6/UL (ref 4.5–5.9)
SODIUM SERPL-SCNC: 144 MMOL/L (ref 136–145)
WBC # BLD AUTO: 6.5 X10*3/UL (ref 4.4–11.3)

## 2024-07-02 PROCEDURE — 99232 SBSQ HOSP IP/OBS MODERATE 35: CPT | Performed by: SURGERY

## 2024-07-02 PROCEDURE — 84075 ASSAY ALKALINE PHOSPHATASE: CPT

## 2024-07-02 PROCEDURE — 83735 ASSAY OF MAGNESIUM: CPT

## 2024-07-02 PROCEDURE — 94760 N-INVAS EAR/PLS OXIMETRY 1: CPT

## 2024-07-02 PROCEDURE — 74250 X-RAY XM SM INT 1CNTRST STD: CPT | Performed by: RADIOLOGY

## 2024-07-02 PROCEDURE — 36415 COLL VENOUS BLD VENIPUNCTURE: CPT

## 2024-07-02 PROCEDURE — 84100 ASSAY OF PHOSPHORUS: CPT

## 2024-07-02 PROCEDURE — 2500000004 HC RX 250 GENERAL PHARMACY W/ HCPCS (ALT 636 FOR OP/ED)

## 2024-07-02 PROCEDURE — 74250 X-RAY XM SM INT 1CNTRST STD: CPT

## 2024-07-02 PROCEDURE — 2500000005 HC RX 250 GENERAL PHARMACY W/O HCPCS

## 2024-07-02 PROCEDURE — 2550000001 HC RX 255 CONTRASTS: Performed by: INTERNAL MEDICINE

## 2024-07-02 PROCEDURE — 99232 SBSQ HOSP IP/OBS MODERATE 35: CPT | Performed by: INTERNAL MEDICINE

## 2024-07-02 PROCEDURE — 85027 COMPLETE CBC AUTOMATED: CPT

## 2024-07-02 PROCEDURE — 1200000002 HC GENERAL ROOM WITH TELEMETRY DAILY

## 2024-07-02 RX ORDER — LABETALOL HYDROCHLORIDE 5 MG/ML
5 INJECTION, SOLUTION INTRAVENOUS EVERY 6 HOURS PRN
Status: DISCONTINUED | OUTPATIENT
Start: 2024-07-02 | End: 2024-07-02

## 2024-07-02 RX ORDER — LABETALOL HYDROCHLORIDE 5 MG/ML
10 INJECTION, SOLUTION INTRAVENOUS EVERY 6 HOURS PRN
Status: DISCONTINUED | OUTPATIENT
Start: 2024-07-02 | End: 2024-07-03 | Stop reason: HOSPADM

## 2024-07-02 ASSESSMENT — PAIN - FUNCTIONAL ASSESSMENT
PAIN_FUNCTIONAL_ASSESSMENT: 0-10
PAIN_FUNCTIONAL_ASSESSMENT: 0-10

## 2024-07-02 ASSESSMENT — ACTIVITIES OF DAILY LIVING (ADL)
TAKING_MEDICATION: INDEPENDENT
MANAGING_FINANCES: INDEPENDENT
DRESSING: INDEPENDENT
BATHING: INDEPENDENT
GROCERY_SHOPPING: INDEPENDENT
DOING_HOUSEWORK: INDEPENDENT

## 2024-07-02 ASSESSMENT — COGNITIVE AND FUNCTIONAL STATUS - GENERAL
TOILETING: A LITTLE
MOVING FROM LYING ON BACK TO SITTING ON SIDE OF FLAT BED WITH BEDRAILS: A LITTLE
PERSONAL GROOMING: A LITTLE
CLIMB 3 TO 5 STEPS WITH RAILING: A LITTLE
STANDING UP FROM CHAIR USING ARMS: A LITTLE
TURNING FROM BACK TO SIDE WHILE IN FLAT BAD: A LITTLE
MOVING TO AND FROM BED TO CHAIR: A LITTLE
MOBILITY SCORE: 18
DRESSING REGULAR UPPER BODY CLOTHING: A LITTLE
DAILY ACTIVITIY SCORE: 24
MOBILITY SCORE: 24
HELP NEEDED FOR BATHING: A LITTLE
WALKING IN HOSPITAL ROOM: A LITTLE
DRESSING REGULAR LOWER BODY CLOTHING: A LITTLE
DAILY ACTIVITIY SCORE: 19

## 2024-07-02 ASSESSMENT — PAIN SCALES - GENERAL
PAINLEVEL_OUTOF10: 0 - NO PAIN
PAINLEVEL_OUTOF10: 0 - NO PAIN

## 2024-07-02 ASSESSMENT — PATIENT HEALTH QUESTIONNAIRE - PHQ9
2. FEELING DOWN, DEPRESSED OR HOPELESS: NOT AT ALL
1. LITTLE INTEREST OR PLEASURE IN DOING THINGS: NOT AT ALL
SUM OF ALL RESPONSES TO PHQ9 QUESTIONS 1 AND 2: 0

## 2024-07-02 NOTE — NURSING NOTE
1930: assumed pt care    2352: pt is on 2L NC oxygen, pt states he does not wear oxygen but uses a CPAP at night; pt is unable to use a CPAP now d/t NG tube. Asking resident Dr. Edwards if he can put in an order for oxygen, also pt wants ice chips, and asking if coreg and eliquis should still be on hold.     2358: offered to give pt IS for C&DB and SCDs, explained purpose, pt refused. Let Dr. Edwards know pt refused SCDs    7-2-24/0549: pt's /76 HR 62, all patient's BP meds on hold, let resident Dr. Edwards know

## 2024-07-02 NOTE — PROGRESS NOTES
Internal Medicine - Daily Progress Note   Hospital Day: 2       Name:Aly Mccarty, AGE: 74 y.o., GENDER: male, MRN: [id number], ROOM: 110/110-A   CODE STATUS: DNR and No Intubation  Attending Physician: Dewayne Shepherd DO  Resident: Mark Senior        Chief Complaint     Chief Complaint   Patient presents with    Abdominal Pain    Nausea    Vomiting    Shortness of Breath        Subjective    Aly Mccarty is a 74 y.o. year old male patient on Hospital Day: 2 presenting for abdominal pain and vomiting.  Overnight events:  Patient reported no overnight events and has improved symptoms since admission.     Meds    Scheduled medications  [Held by provider] allopurinol, 100 mg, oral, Daily  [Held by provider] B complex-vitamin C-folic acid, 1 capsule, oral, Daily  [Held by provider] carvedilol, 25 mg, oral, BID  [Held by provider] cyanocobalamin, 2,000 mcg, oral, Every other day  [COMPLETED] diatrizoate madeline-diatrizoat sod, 60 mL, oral, Once  [Held by provider] folic acid, 1 mg, oral, Daily  [Held by provider] hydrALAZINE, 50 mg, oral, BID  [Held by provider] levothyroxine, 88 mcg, oral, Daily  [Held by provider] levothyroxine, 88 mcg, oral, q AM  [Held by provider] loratadine, 10 mg, oral, Daily  [Held by provider] phenazopyridine, 200 mg, oral, TID  [Held by provider] rivaroxaban, 20 mg, oral, Daily with evening meal  [Held by provider] tamsulosin, 0.4 mg, oral, Daily  [Held by provider] thiamine, 100 mg, oral, Daily      Continuous medications  lactated Ringer's, 100 mL/hr, Last Rate: 100 mL/hr (07/02/24 1101)      PRN medications  PRN medications: acetaminophen, HYDROmorphone, HYDROmorphone, HYDROmorphone, labetaloL, ondansetron **OR** ondansetron, oxygen     Objective    Physical Exam  Constitutional:       General: He is not in acute distress.     Appearance: He is obese. He is not ill-appearing.   HENT:      Head: Normocephalic and atraumatic.      Right Ear: External ear normal.      Left Ear:  "External ear normal.      Mouth/Throat:      Mouth: Mucous membranes are moist.   Eyes:      Extraocular Movements: Extraocular movements intact.      Conjunctiva/sclera: Conjunctivae normal.   Cardiovascular:      Rate and Rhythm: Normal rate and regular rhythm.      Pulses: Normal pulses.   Pulmonary:      Effort: Pulmonary effort is normal. No respiratory distress.      Breath sounds: No rhonchi.   Abdominal:      General: Bowel sounds are normal. There is no distension.      Palpations: Abdomen is soft.      Tenderness: There is no abdominal tenderness. There is no guarding or rebound.      Hernia: A hernia is present.   Musculoskeletal:         General: No tenderness. Normal range of motion.      Cervical back: Normal range of motion. No rigidity or tenderness.   Skin:     Capillary Refill: Capillary refill takes less than 2 seconds.   Neurological:      General: No focal deficit present.      Mental Status: He is alert and oriented to person, place, and time.   Psychiatric:         Mood and Affect: Mood normal.         Behavior: Behavior normal.         Thought Content: Thought content normal.         Judgment: Judgment normal.        Visit Vitals  /88 (BP Location: Right arm, Patient Position: Lying)   Pulse 62   Temp 36.3 °C (97.3 °F) (Temporal)   Resp 17   Ht 1.88 m (6' 2\")   Wt (!) 154 kg (339 lb 1.1 oz)   SpO2 96%   BMI 43.53 kg/m²   Smoking Status Never   BSA 2.84 m²        Intake/Output Summary (Last 24 hours) at 7/2/2024 1345  Last data filed at 7/2/2024 0900  Gross per 24 hour   Intake 1778.33 ml   Output 2730 ml   Net -951.67 ml       Labs:   Results from last 72 hours   Lab Units 07/02/24  0653 07/01/24  0659   SODIUM mmol/L 144 139   POTASSIUM mmol/L 3.6 3.9   CHLORIDE mmol/L 107 104   CO2 mmol/L 31 23   BUN mg/dL 18 20   CREATININE mg/dL 1.25 1.42*   GLUCOSE mg/dL 89 126*   CALCIUM mg/dL 8.5* 9.5   ANION GAP mmol/L 10 16   EGFR mL/min/1.73m*2 61 52*   PHOSPHORUS mg/dL 3.7  --       Results " "from last 72 hours   Lab Units 07/02/24  0653 07/01/24  0659   WBC AUTO x10*3/uL 6.5 11.6*   HEMOGLOBIN g/dL 10.0* 11.9*   HEMATOCRIT % 33.3* 38.8*   PLATELETS AUTO x10*3/uL 93* 101*   NEUTROS PCT AUTO %  --  91.2   LYMPHS PCT AUTO %  --  3.7   MONOS PCT AUTO %  --  4.1   EOS PCT AUTO %  --  0.1      Lab Results   Component Value Date    CALCIUM 8.5 (L) 07/02/2024    PHOS 3.7 07/02/2024      Lab Results   Component Value Date    CRP CANCELED 08/24/2023       [unfilled]     Micro/ID:   No results found for the last 90 days.                   No lab exists for component: \"AGALPCRNB\"     Lab Results   Component Value Date    URINECULTURE (A) 06/07/2024     Multiple organisms present, probable contamination. Repeat culture if clinically indicated.       Images    XR abdomen 1 view    Result Date: 7/1/2024  Interpreted By:  Emeterio Plascencia, STUDY: XR ABDOMEN 1 VIEW;  7/1/2024 12:26 pm   INDICATION: Signs/Symptoms:NG tube placement.   COMPARISON: November 23, 2022 chest radiographs. July 1, 2024 CT chest abdomen and pelvis   ACCESSION NUMBER(S): [id number]   ORDERING CLINICIAN: VIVEK PERRY   FINDINGS: Tip of the NG tube projects over the left proximal stomach to the left of midline. Nonobstructive bowel gas pattern. Limited evaluation of pneumoperitoneum on supine imaging, however no gross evidence of free air is noted.   Similar appearance of the chest. Right-sided effusion noted on same day CT better evaluated by CT. Aortic prosthesis is similar.   Osseous structures demonstrate no acute bony changes.       1.  NG tube projects over the proximal stomach.   MACRO: None   Signed by: Emeterio Plascencia 7/1/2024 12:35 PM Dictation workstation:   [id number]    CT chest abdomen pelvis w IV contrast    Result Date: 7/1/2024  Interpreted By:  Jailyn Aleman, STUDY: CT CHEST ABDOMEN PELVIS W IV CONTRAST;  7/1/2024 7:51 am   INDICATION: Signs/Symptoms:New hypoxia with worsening abdominal distention.   COMPARISON: CT " abdomen and pelvis 07/11/2018   ACCESSION NUMBER(S): [id number]   ORDERING CLINICIAN: GUERA FITZGERALD   TECHNIQUE: CT of the chest, abdomen, and pelvis was performed. Contiguous axial images were obtained at 3 mm slice thickness through the chest, abdomen and pelvis. Coronal and sagittal reconstructions at 3 mm slice thickness were performed. 75 mL Omnipaque 350   FINDINGS: CHEST:   LUNG/PLEURA/LARGE AIRWAYS: There are small bilateral pleural effusions, right greater than left. There is no pulmonary nodule.   VESSELS: There is atherosclerotic calcification of the thoracic aorta, particularly the aortic arch.   HEART: Status post transcatheter aortic valve repair (TAVR). There is calcification of the mitral annulus. There are mild coronary artery calcifications.   MEDIASTINUM AND SCOTT: There is no hilar or mediastinal adenopathy.   CHEST WALL AND LOWER NECK: The chest wall is unremarkable. There is no abnormality of the lower neck.   ABDOMEN:   LIVER: There is a benign simple left hepatic cyst.   BILE DUCTS: There is no intrahepatic, common hepatic or common bile ductal dilatation.   GALLBLADDER: There is a small gallstone in the proximal body of an otherwise unremarkable gallbladder.   PANCREAS: There is no abnormality of the pancreas.   SPLEEN: The spleen is unremarkable. There is no splenic mass. There is no splenomegaly   ADRENAL GLANDS: The adrenal glands are unremarkable.   KIDNEYS AND URETERS:. The kidneys function symmetrically. There are benign simple subcentimeter upper pole left renal cyst. There are bilateral punctate nonobstructing intrarenal calculi.     PELVIS:   BLADDER: The bladder is unremarkable.   REPRODUCTIVE ORGANS: There is no abnormality of the reproductive organs.   BOWEL: There is moderate gastric distention. There are markedly dilated loops of small bowel. There is a collapsed distal small bowel and collapsed colon. Findings are consistent with a high-grade distal small-bowel obstruction.  There is either fluid or a distended loop of bowel in a left periumbilical hernia. There does not appear to be a dilated afferent loop or collapsed efferent loop but it would be important know if this is reversible. This could be incarcerated.   VESSELS: The abdominal and pelvic vessels are unremarkable.   PERITONEUM/RETROPERITONEUM/LYMPH NODES: There is no retroperitoneal or pelvic adenopathy.  There is a small crescent of ascites superior to the spleen.   ABDOMINAL WALL: There is reticulation of the subcutaneous fat encircling the abdomen and pelvis and dependent edema in the subcutaneous fat along the back back. This could represent anasarca/hypoproteinemia.   BONES AND SOFT TISSUES: There is no acute osseous finding.   There is a 2.5 cm coarse eggshell calcification centrally in the mesentery consistent with benign fat necrosis. There is a 4.4 x 2.6 cm fatty mass with surrounding serpentine area of soft tissue density unchanged compared to 07/11/2018 consistent with fat necrosis.       CHEST: 1.  Small bilateral pleural effusions, right greater than left. 2. Status post transcatheter aortic valve repair.   ABDOMEN AND PELVIS: 1.  High-grade distal small-bowel obstruction. There is either a pocket of ascites or a dilated loop of small bowel in a left periumbilical hernia. It would be important know if this is reducible or incarcerated. This could represent the point of obstruction. However, there is no definite dilated afferent loop or collapsed efferent loop. 2. Benign hepatic and renal cysts. 3. Bilateral nonobstructing intrarenal calculi. 4. Gallstone in an otherwise unremarkable gallbladder. 5. Anasarca/hypoproteinemia.   MACRO: None   Signed by: Jailyn Aleman 7/1/2024 10:15 AM Dictation workstation:   [id number]    ECG 12 lead    Result Date: 7/1/2024  Atrial fibrillation Left axis deviation Incomplete right bundle branch block Septal infarct (cited on or before 04-MAY-2020) Abnormal ECG When compared  with ECG of 23-NOV-2022 05:35, Questionable change in initial forces of Septal leads      Assessment and Plan    Aly Mccarty is a 74 y.o. male admitted on 7/1/2024 with pmhx CKD stage 3, Atrial fibrillation on Xarelto, Chronic UTI, Hypothyroidism, osteomyelitis and septicemia, and pancytopenia arrived in the ED for abdominal pain and vomiting.      ACUTE MEDICAL ISSUES:  #Abdominal Pain secondary to Small Bowel Obstruction  #Reducible paraumbilical hernia  #Vomiting  -Patient described localized bilateral lower abdominal pain 7/10 with one episode of forceful vomiting the morning of admission. Currently patient reports improvement in symptoms and is resting comfortably.  -On physical examination, patient had a paraumbilical, non-tender, hernia that was reducible.  -CT Abd/Pelvis (7/2/24) revealed dilated loops of small bowel and gastric distension accompanied by periumbilical hernia  - Tx: Patient placed on maintenance fluid regiment LR 100ml/hr,  Pain control with Dilaudid (Morphine was switched due to hx of CKD), Zofran 4mg  -Surgery Consult did not recommend emergency surgery at this time. Surgery team performed small bowel follow through which indicated no complete small bowel obstruction and nonspecific mild jejunal distension  -Will advice outpatient follow-up for hernia  - Supportive: NPO diet plan with ice chips.  #Hypertension  -Blood pressure was found to be 178/76 the morning of 7/2, and decreased to 167/88  -Will continue to hold medication until NG follow-through occurs  -Continue to monitor blood pressure, if SBP>180, will give Labetalol     ADDRESSED/ RESOLVED MEDICAL ISSUES:    #Hyperbilirubinemia  -Total Bilirubin decreased to 0.9  -Continue to Monitor with daily CMPs    #Elevated Troponins  -Troponin level of 40; Second Troponin 40 (7/2/24)  : : Likely due to demand ischemia  - (7/2/24)      CHRONIC MEDICAL ISSUES:    #Atrial fibrillation - seen on EKG, Rate control with coreg and AC  with Xarelto - HOLD 2/2 NPO and possible surgery  #Gout: Allopurinol on HOLD 2/2 NPO  #CKDIII - Nephrocaps on hold 2/2 NPO. Cr of 1.42 (Baseline 1.8-2.2)  # Hypovitaminosis: Cynacobalmin, Folic Acid, Pyridium, Thiamine on hold 2/2 to NPO  #Hypothyroidism: Levothyroxine on hold 2/2 to NPO  #HTN- Most recent /78, Hydralazine on hold 2/2 to NPO      Fluids: Continuous LR  Electrolytes: Replete  Nutrition: NPO except ice chips  Antimicrobials: None indicated  DVT ppx: Patient refused SCD, holding home Xarelto 2/2 to NPO  GI ppx: None  Catheter: None  Lines: 18 G PIV in L upper arm  Supplemental Oxygen:   Emergency Contact: Extended Emergency Contact Information  Primary Emergency Contact: Sole Mccarty  Address: [id number] [place]  Mobile Phone: [phone]  Relation: Spouse   Code: DNR and No Intubation     Disposition: ELOS>2 days      Mark Senior DO  Internal Medicine, PGY- 1  07/02/24 at 1:45 PM

## 2024-07-02 NOTE — CARE PLAN
The patient's goals for the shift include feel better    The clinical goals for the shift include decreased fever, pain      Problem: Skin  Goal: Decreased wound size/increased tissue granulation at next dressing change  Outcome: Progressing  Goal: Participates in plan/prevention/treatment measures  Outcome: Progressing  Goal: Prevent/manage excess moisture  Outcome: Progressing  Goal: Prevent/minimize sheer/friction injuries  Outcome: Progressing  Goal: Promote/optimize nutrition  Outcome: Progressing  Goal: Promote skin healing  Outcome: Progressing     Problem: Fall/Injury  Goal: Not fall by end of shift  Outcome: Progressing  Goal: Be free from injury by end of the shift  Outcome: Progressing  Goal: Verbalize understanding of personal risk factors for fall in the hospital  Outcome: Progressing  Goal: Verbalize understanding of risk factor reduction measures to prevent injury from fall in the home  Outcome: Progressing  Goal: Use assistive devices by end of the shift  Outcome: Progressing  Goal: Pace activities to prevent fatigue by end of the shift  Outcome: Progressing     Problem: Diabetes  Goal: Achieve decreasing blood glucose levels by end of shift  Outcome: Progressing  Goal: Increase stability of blood glucose readings by end of shift  Outcome: Progressing  Goal: Decrease in ketones present in urine by end of shift  Outcome: Progressing  Goal: Maintain electrolyte levels within acceptable range throughout shift  Outcome: Progressing  Goal: Maintain glucose levels >70mg/dl to <250mg/dl throughout shift  Outcome: Progressing  Goal: No changes in neurological exam by end of shift  Outcome: Progressing  Goal: Learn about and adhere to nutrition recommendations by end of shift  Outcome: Progressing  Goal: Vital signs within normal range for age by end of shift  Outcome: Progressing  Goal: Increase self care and/or family involovement by end of shift  Outcome: Progressing  Goal: Receive DSME education by end of  shift  Outcome: Progressing     Problem: Pain  Goal: Takes deep breaths with improved pain control throughout the shift  Outcome: Progressing  Goal: Turns in bed with improved pain control throughout the shift  Outcome: Progressing  Goal: Walks with improved pain control throughout the shift  Outcome: Progressing  Goal: Performs ADL's with improved pain control throughout shift  Outcome: Progressing  Goal: Participates in PT with improved pain control throughout the shift  Outcome: Progressing  Goal: Free from opioid side effects throughout the shift  Outcome: Progressing  Goal: Free from acute confusion related to pain meds throughout the shift  Outcome: Progressing

## 2024-07-02 NOTE — PROGRESS NOTES
General Surgery Daily Progress Note      Subjective:  The patient is doing well today.  He is no acute distress.  The patient denies nausea and vomiting.  He had nonbilious minimal output from his NG tube.  He then underwent a small bowel follow-through without any abdominal pain or nausea.  He reports already having had diarrhea since the small bowel follow-through.  He reports feeling well and wants to go home.        Objective:  Vitals:   Vitals:    07/02/24 1450   BP: 170/75   Pulse: 85   Resp: 18   Temp: 36.3 °C (97.3 °F)   SpO2: 96%       Physical Exam:   General: No acute distress. Sitting up in bed.   Neuro: Alert and oriented ×3. Follows commands.  Head: Atraumatic  Eyes: Pupils equal reactive to light. Extraocular motions intact.  Ears: Hears normal speaking voice.  Neck: Supple. No appreciable masses.  Heart: Regular rate  Lungs: No audible wheeze.  Breathing comfortably.  Abdomen: Soft. Nondistended.  Obese.  Nontender.  Reducible umbilical hernia.  Musculoskeletal: Moves all extremities.  Normal range of motion.  Skin: No rashes or lesions.  Psychological: Normal affect        Labs:  CBC:   Lab Results   Component Value Date    WBC 6.5 07/02/2024    RBC 2.92 (L) 07/02/2024    HGB 10.0 (L) 07/02/2024    HCT 33.3 (L) 07/02/2024    PLT 93 (L) 07/02/2024       RFP:   Lab Results   Component Value Date     07/02/2024    K 3.6 07/02/2024     07/02/2024    CO2 31 07/02/2024    BUN 18 07/02/2024    CREATININE 1.25 07/02/2024    CALCIUM 8.5 (L) 07/02/2024    MG 1.93 07/02/2024    PHOS 3.7 07/02/2024        LFTs:   Lab Results   Component Value Date    PROT 6.3 (L) 07/02/2024    ALBUMIN 3.5 07/02/2024    BILITOT 0.9 07/02/2024    BILIDIR 0.4 (H) 07/01/2024    ALKPHOS 97 07/02/2024    AST 13 07/02/2024    ALT 20 07/02/2024              Images:  I personally reviewed the images and the radiologist's report.  Small bowel follow-through: Contrast reaches the colon in 2 hours      Assessment:  This is a 74  y.o. year old male who is admitted for vomiting and abdominal pain.  We discussed that it appears that his small bowel obstruction was due to his umbilical hernia, which was reduced in the emergency department.  His small bowel follow-through shows no evidence of a persistent obstruction.  He is tolerating a full liquid diet.  We discussed that he is safe to be discharged home from a surgical standpoint.  We discussed that he should definitely follow-up with a general surgeon such as myself as a patient to discuss scheduled elective umbilical hernia repair to prevent a recurrence.      Plan:  -- Okay for discharge today with diet as tolerated from a surgical standpoint  -- Outpatient follow-up with general surgery to discuss elective umbilical hernia repair        Yassine Araujo MD  General Surgery  Office: [phone]  Fax: [phone]  6:23 PM  07/02/24

## 2024-07-02 NOTE — HOSPITAL COURSE
Patient presented to the ED on Monday for generalized fatigue, malaise, and abdominal pain that lasted for 3 days. Patient also had an episode of nonbloody emesis on the morning of admission. He did not have a bowel movement for 2 days prior to admission. Patient's vitals upon presentation to the ED were: BP of 176/87, SPO2 93%, HR 78, RR 22, temp 97.3 F. Patient did not use oxygen at home, but sleeps with a CPAP.  He was given 2L of supplemental oxygen and SpO2 improved to 97%. EKG was interpreted as atrial fibrillation at a rate of 70 bpm. Patient reported chronic history of atrial fibrillation for which he takes Xarelto at home. Patient was given morphine and ondansetron in the ED. CT Chest/Abd/Pelvis indicated dilated small bowel loops with moderate gastric distension. WBC were elevated at 11.6, Creatinine was 1.42 (Pt. Baseline 1.8-2.2), Bilirubin 1.6 with direct 0.4. BNP: 594, and serial troponins: 40, 40, and 41. On physical examination, patient was found to have a paraumbilical hernia which was reduced in the ED. General Surgery was consulted and recommended decompression with NG tube. Placement was confirmed with CXR and patient was transferred to general floor.    On the general floor, patient was placed NPO pending surgery evaluation. Patient was switched from morphine to hydromorphone due to history of CKD. He reported improvement in abdominal pain Patient continued to receive 2L of supplemental oxygen and SPO2 remained stable. Home medications were held due to possible surgery and patient's systolic blood pressure ranged from 167-178. Labetalol PRN every 6 hours was ordered if systolic BP increased over 180. On the floor, WBC level decreased to 6.5 and total bilirubin decreased to 0.9. Surgery team did not recommend surgery and completed a small bowel follow through the NG tube, which showed mild jejunal distension and no complete small bowel obstruction. He was medically stable and discharged home on  ***

## 2024-07-03 ENCOUNTER — APPOINTMENT (OUTPATIENT)
Dept: HEMATOLOGY/ONCOLOGY | Facility: CLINIC | Age: 74
End: 2024-07-03
Payer: MEDICARE

## 2024-07-03 VITALS
BODY MASS INDEX: 40.43 KG/M2 | HEART RATE: 70 BPM | HEIGHT: 74 IN | SYSTOLIC BLOOD PRESSURE: 166 MMHG | TEMPERATURE: 97.7 F | RESPIRATION RATE: 20 BRPM | DIASTOLIC BLOOD PRESSURE: 77 MMHG | OXYGEN SATURATION: 94 % | WEIGHT: 315 LBS

## 2024-07-03 LAB
ALBUMIN SERPL BCP-MCNC: 3.5 G/DL (ref 3.4–5)
ALP SERPL-CCNC: 101 U/L (ref 33–136)
ALT SERPL W P-5'-P-CCNC: 18 U/L (ref 10–52)
ANION GAP SERPL CALC-SCNC: 10 MMOL/L (ref 10–20)
AST SERPL W P-5'-P-CCNC: 16 U/L (ref 9–39)
BILIRUB SERPL-MCNC: 0.8 MG/DL (ref 0–1.2)
BUN SERPL-MCNC: 17 MG/DL (ref 6–23)
CALCIUM SERPL-MCNC: 8.5 MG/DL (ref 8.6–10.3)
CHLORIDE SERPL-SCNC: 106 MMOL/L (ref 98–107)
CO2 SERPL-SCNC: 33 MMOL/L (ref 21–32)
CREAT SERPL-MCNC: 1.25 MG/DL (ref 0.5–1.3)
EGFRCR SERPLBLD CKD-EPI 2021: 61 ML/MIN/1.73M*2
ERYTHROCYTE [DISTWIDTH] IN BLOOD BY AUTOMATED COUNT: 13.6 % (ref 11.5–14.5)
GLUCOSE SERPL-MCNC: 95 MG/DL (ref 74–99)
HCT VFR BLD AUTO: 33.2 % (ref 41–52)
HGB BLD-MCNC: 9.7 G/DL (ref 13.5–17.5)
MAGNESIUM SERPL-MCNC: 1.88 MG/DL (ref 1.6–2.4)
MCH RBC QN AUTO: 33.7 PG (ref 26–34)
MCHC RBC AUTO-ENTMCNC: 29.2 G/DL (ref 32–36)
MCV RBC AUTO: 115 FL (ref 80–100)
NRBC BLD-RTO: 0 /100 WBCS (ref 0–0)
PHOSPHATE SERPL-MCNC: 3 MG/DL (ref 2.5–4.9)
PLATELET # BLD AUTO: 103 X10*3/UL (ref 150–450)
POTASSIUM SERPL-SCNC: 3.8 MMOL/L (ref 3.5–5.3)
PROT SERPL-MCNC: 6.2 G/DL (ref 6.4–8.2)
RBC # BLD AUTO: 2.88 X10*6/UL (ref 4.5–5.9)
SODIUM SERPL-SCNC: 145 MMOL/L (ref 136–145)
WBC # BLD AUTO: 5 X10*3/UL (ref 4.4–11.3)

## 2024-07-03 PROCEDURE — 99239 HOSP IP/OBS DSCHRG MGMT >30: CPT

## 2024-07-03 PROCEDURE — 2500000002 HC RX 250 W HCPCS SELF ADMINISTERED DRUGS (ALT 637 FOR MEDICARE OP, ALT 636 FOR OP/ED)

## 2024-07-03 PROCEDURE — 2500000001 HC RX 250 WO HCPCS SELF ADMINISTERED DRUGS (ALT 637 FOR MEDICARE OP)

## 2024-07-03 PROCEDURE — 80053 COMPREHEN METABOLIC PANEL: CPT

## 2024-07-03 PROCEDURE — 83735 ASSAY OF MAGNESIUM: CPT

## 2024-07-03 PROCEDURE — 36415 COLL VENOUS BLD VENIPUNCTURE: CPT

## 2024-07-03 PROCEDURE — 2500000004 HC RX 250 GENERAL PHARMACY W/ HCPCS (ALT 636 FOR OP/ED)

## 2024-07-03 PROCEDURE — 85027 COMPLETE CBC AUTOMATED: CPT

## 2024-07-03 PROCEDURE — 2500000005 HC RX 250 GENERAL PHARMACY W/O HCPCS

## 2024-07-03 PROCEDURE — 84100 ASSAY OF PHOSPHORUS: CPT

## 2024-07-03 RX ORDER — ACETAMINOPHEN 500 MG
TABLET ORAL
Start: 2024-07-03

## 2024-07-03 RX ORDER — POTASSIUM CHLORIDE 20 MEQ/1
20 TABLET, EXTENDED RELEASE ORAL ONCE
Status: COMPLETED | OUTPATIENT
Start: 2024-07-03 | End: 2024-07-03

## 2024-07-03 ASSESSMENT — COGNITIVE AND FUNCTIONAL STATUS - GENERAL
WALKING IN HOSPITAL ROOM: A LITTLE
STANDING UP FROM CHAIR USING ARMS: A LITTLE
DRESSING REGULAR LOWER BODY CLOTHING: A LITTLE
TOILETING: A LITTLE
TURNING FROM BACK TO SIDE WHILE IN FLAT BAD: A LITTLE
HELP NEEDED FOR BATHING: A LITTLE
DRESSING REGULAR UPPER BODY CLOTHING: A LITTLE
DAILY ACTIVITIY SCORE: 19
PERSONAL GROOMING: A LITTLE
CLIMB 3 TO 5 STEPS WITH RAILING: A LITTLE
MOVING TO AND FROM BED TO CHAIR: A LITTLE

## 2024-07-03 ASSESSMENT — PAIN SCALES - GENERAL: PAINLEVEL_OUTOF10: 0 - NO PAIN

## 2024-07-03 NOTE — CARE PLAN
The patient's goals for the shift include feel better    The clinical goals for the shift include patient will remain free from falls and injury throughout shift

## 2024-07-03 NOTE — DISCHARGE SUMMARY
Discharge Diagnosis  #Abdominal Pain secondary to Small Bowel Obstruction  #Reducible paraumbilical hernia  Issues Requiring Follow-Up  - Please go see general surgery for follow-up of paraumbilical hernia    Discharge Meds     Your medication list        START taking these medications        Instructions Last Dose Given Next Dose Due   acetaminophen 500 mg tablet  Commonly known as: Tylenol      Take 1-2 by mouth every 6 hours as needed for pain              CONTINUE taking these medications        Instructions Last Dose Given Next Dose Due   allopurinol 100 mg tablet  Commonly known as: Zyloprim      Take 1 tablet (100 mg) by mouth once daily.       B complex-vitamin C-folic acid 1- mg-mg-mcg tablet  Commonly known as: Nephro-Chi Rx      Take 1 tablet by mouth once daily with a meal.       carvedilol 25 mg tablet  Commonly known as: Coreg           cetirizine 10 mg tablet  Commonly known as: ZyrTEC           cyanocobalamin 1,000 mcg tablet  Commonly known as: Vitamin B-12           folic acid 1 mg tablet  Commonly known as: Folvite      TAKE 1 TABLET BY MOUTH EVERY DAY       hydrALAZINE 50 mg tablet  Commonly known as: Apresoline      Take 1 tablet (50 mg) by mouth 2 times a day.       levothyroxine 88 mcg tablet  Commonly known as: Synthroid, Levoxyl      TAKE 1 TABLET BY MOUTH EVERY DAY ON EMPTY STOMACH       lisinopril 40 mg tablet           phenazopyridine 200 mg tablet  Commonly known as: Pyridium           tamsulosin 0.4 mg 24 hr capsule  Commonly known as: Flomax      Take 1 capsule (0.4 mg) by mouth once daily.       thiamine 100 mg tablet  Commonly known as: Vitamin B-1           Xarelto 20 mg tablet  Generic drug: rivaroxaban                     Where to Get Your Medications        Information about where to get these medications is not yet available    Ask your nurse or doctor about these medications  acetaminophen 500 mg tablet         Test Results Pending At Discharge  Pending Labs       No  current pending labs.            Hospital Course  Patient presented to the ED on Monday for generalized fatigue, malaise, and abdominal pain that lasted for 3 days. Patient also had an episode of nonbloody emesis on the morning of admission. He did not have a bowel movement for 2 days prior to admission. Patient's vitals upon presentation to the ED were: BP of 176/87, SPO2 93%, HR 78, RR 22, temp 97.3 F. Patient did not use oxygen at home, but sleeps with a CPAP.  He was given 2L of supplemental oxygen and SpO2 improved to 97%. EKG was interpreted as atrial fibrillation at a rate of 70 bpm. Patient reported chronic history of atrial fibrillation for which he takes Xarelto at home. Patient was given morphine and ondansetron in the ED. CT Chest/Abd/Pelvis indicated dilated small bowel loops with moderate gastric distension. WBC were elevated at 11.6, Creatinine was 1.42 (Pt. Baseline 1.8-2.2), Bilirubin 1.6 with direct 0.4. BNP: 594, and serial troponins: 40, 40, and 41. General Surgery was consulted and recommended decompression with NG tube. Placement was confirmed with CXR and patient was transferred to general floor.    On the general floor, patient was placed NPO. Pain control was achieved with hydromorphone. He reported improvement in abdominal pain Patient continued to receive 2L of supplemental oxygen and SPO2 remained stable. Home medications were held due to possible surgery and patient's systolic blood pressure ranged from 167-178. On the floor, WBC level decreased to 6.5 and total bilirubin decreased to 0.9. Surgery team did not recommend surgery and completed a small bowel follow through the NG tube, which showed mild jejunal distension and no complete small bowel obstruction. He was medically stable and discharged home on 07/03/2024 with recommendations to follow-up with PCP in 2 weeks and general surgery in a month.    Pertinent Physical Exam At Time of Discharge  Physical Exam  Constitutional:        General: He is not in acute distress.     Appearance: He is obese. He is not ill-appearing.   HENT:      Head: Normocephalic and atraumatic.      Right Ear: External ear normal.      Left Ear: External ear normal.      Nose: Nose normal.      Mouth/Throat:      Mouth: Mucous membranes are moist.   Eyes:      Extraocular Movements: Extraocular movements intact.      Conjunctiva/sclera: Conjunctivae normal.   Cardiovascular:      Rate and Rhythm: Normal rate and regular rhythm.      Pulses: Normal pulses.      Heart sounds: Normal heart sounds. No murmur heard.     No gallop.   Pulmonary:      Effort: Pulmonary effort is normal. No respiratory distress.      Breath sounds: Normal breath sounds. No wheezing or rhonchi.   Abdominal:      Palpations: Abdomen is soft.      Hernia: A hernia is present.   Musculoskeletal:         General: Normal range of motion.      Cervical back: Normal range of motion and neck supple. No rigidity or tenderness.   Skin:     General: Skin is warm.   Neurological:      Mental Status: He is alert and oriented to person, place, and time.         Outpatient Follow-Up  Future Appointments   Date Time Provider Department [place]   7/5/2024 12:00 PM Luis Eduardo Kay DO [id number] [facility]   7/17/2024 11:00 AM INF 00 GEAUGA SCCGEAINF [facility]   7/31/2024 11:00 AM INF 00 GEAUGA SCCGEAINF [facility]   8/14/2024 10:30 AM RICARDA Fatima-CNP [id number] [facility]   8/14/2024 11:00 AM INF 00 GEAUGA SCCGEAINF [facility]   10/15/2024  1:40 PM Milli Mott MD [id number] [facility]         Mark Senior

## 2024-07-03 NOTE — PROGRESS NOTES
Subjective   Patient ID: Aly Mccarty is a 74 y.o. male who presents for Medicare Annual Wellness Visit Subsequent.    HPI     Recently admitted for SBO, resolved with NGT decompression. No without abd pain and bowels are regular. Will see general surgery for paraumbilical hernia     Following with heme for pancytopenia. Received MEENA injection 2 weeks ago.   Stopped drinking EtOH  3 weeks ago. Was drinking about 6 oz of liquor  BP at home has been good.     Following with Dr. Lozada for BPH, urinary retention and cystitis. He self cath a few times daily.     Reports intermittent SOB at rest, he does not ambulate. No CP. Has trace-1+ edema which is a little worse than usual. No orthopnea. He uses a CPAP- no PND. Never a smoker. Has gained 15-20 lbs in 2-3 months. Has known h/o chronic afib, s/p TAVR in . While inpatient CT showed b/l pleural effusions R>L,  (previously 249), trop elevation due to demand ischemia. TTE  with normal EF, mod concentric hypertrophy of LV, low normal RV systolic function.         Review of Systems    Objective   There were no vitals taken for this visit.    Physical Exam    Assessment/Plan         #SOB  -Suspect HFpEF (elevated BNP, pleural effusions on CT, weight gain and LE edema). Start furosemide 40mg daily , check BMP in 7-10 days.   -Refer to cardiology     #HTN, Chronic Afib, AVS s/p TAVR  -BP well controlled at home. Cont hydrochlorothiazide 12.5mg daily, hydralazine 50mg TID, carvedilol 25mg BID and lisinopril 40mg daily   -Cont Xarelto     #Pancytopenia   -Following with heme. Getting MEENA injections.      #Hypothyroidism  -Check TSH today, cont levothyroxine      #AMANDA  -Reports compliance with CPAP     #Chronic dysuria, urinary retention and BPH   -Follows with Dr. Kohli, self caths several times daily, cont Tamsulosin      Colorectal cancer screening: 3-4 years ago  Prostate screenin23     RTC 6 mo

## 2024-07-05 ENCOUNTER — APPOINTMENT (OUTPATIENT)
Dept: PRIMARY CARE | Facility: CLINIC | Age: 74
End: 2024-07-05
Payer: MEDICARE

## 2024-07-05 ENCOUNTER — DOCUMENTATION (OUTPATIENT)
Dept: CARE COORDINATION | Facility: CLINIC | Age: 74
End: 2024-07-05

## 2024-07-05 VITALS
HEIGHT: 74 IN | SYSTOLIC BLOOD PRESSURE: 138 MMHG | OXYGEN SATURATION: 95 % | HEART RATE: 63 BPM | DIASTOLIC BLOOD PRESSURE: 90 MMHG | WEIGHT: 315 LBS | TEMPERATURE: 97.8 F | BODY MASS INDEX: 40.43 KG/M2

## 2024-07-05 DIAGNOSIS — L98.9 SKIN LESION: ICD-10-CM

## 2024-07-05 DIAGNOSIS — Z00.00 ROUTINE GENERAL MEDICAL EXAMINATION AT HEALTH CARE FACILITY: Primary | ICD-10-CM

## 2024-07-05 DIAGNOSIS — E03.9 HYPOTHYROIDISM, UNSPECIFIED TYPE: ICD-10-CM

## 2024-07-05 DIAGNOSIS — I85.00 ESOPHAGEAL VARICES WITHOUT BLEEDING, UNSPECIFIED ESOPHAGEAL VARICES TYPE (MULTI): ICD-10-CM

## 2024-07-05 DIAGNOSIS — E66.01 MORBID (SEVERE) OBESITY DUE TO EXCESS CALORIES (MULTI): ICD-10-CM

## 2024-07-05 DIAGNOSIS — I50.32 CHRONIC DIASTOLIC CONGESTIVE HEART FAILURE (MULTI): ICD-10-CM

## 2024-07-05 DIAGNOSIS — I10 PRIMARY HYPERTENSION: ICD-10-CM

## 2024-07-05 DIAGNOSIS — J45.901 ASTHMA WITH COPD WITH EXACERBATION (MULTI): ICD-10-CM

## 2024-07-05 DIAGNOSIS — J44.1 ASTHMA WITH COPD WITH EXACERBATION (MULTI): ICD-10-CM

## 2024-07-05 PROBLEM — E44.0 MALNUTRITION OF MODERATE DEGREE (MULTI): Status: RESOLVED | Noted: 2017-07-05 | Resolved: 2024-07-05

## 2024-07-05 PROCEDURE — 4010F ACE/ARB THERAPY RXD/TAKEN: CPT | Performed by: INTERNAL MEDICINE

## 2024-07-05 PROCEDURE — 1159F MED LIST DOCD IN RCRD: CPT | Performed by: INTERNAL MEDICINE

## 2024-07-05 PROCEDURE — 3075F SYST BP GE 130 - 139MM HG: CPT | Performed by: INTERNAL MEDICINE

## 2024-07-05 PROCEDURE — 1157F ADVNC CARE PLAN IN RCRD: CPT | Performed by: INTERNAL MEDICINE

## 2024-07-05 PROCEDURE — 1170F FXNL STATUS ASSESSED: CPT | Performed by: INTERNAL MEDICINE

## 2024-07-05 PROCEDURE — 1036F TOBACCO NON-USER: CPT | Performed by: INTERNAL MEDICINE

## 2024-07-05 PROCEDURE — 1160F RVW MEDS BY RX/DR IN RCRD: CPT | Performed by: INTERNAL MEDICINE

## 2024-07-05 PROCEDURE — 99214 OFFICE O/P EST MOD 30 MIN: CPT | Performed by: INTERNAL MEDICINE

## 2024-07-05 PROCEDURE — G0439 PPPS, SUBSEQ VISIT: HCPCS | Performed by: INTERNAL MEDICINE

## 2024-07-05 PROCEDURE — 3060F POS MICROALBUMINURIA REV: CPT | Performed by: INTERNAL MEDICINE

## 2024-07-05 PROCEDURE — 1111F DSCHRG MED/CURRENT MED MERGE: CPT | Performed by: INTERNAL MEDICINE

## 2024-07-05 PROCEDURE — 3080F DIAST BP >= 90 MM HG: CPT | Performed by: INTERNAL MEDICINE

## 2024-07-05 RX ORDER — FUROSEMIDE 40 MG/1
40 TABLET ORAL DAILY
Qty: 30 TABLET | Refills: 1 | Status: SHIPPED | OUTPATIENT
Start: 2024-07-05 | End: 2025-07-05

## 2024-07-05 RX ORDER — PREDNISONE 10 MG/1
TABLET ORAL
COMMUNITY
Start: 2024-06-26

## 2024-07-05 ASSESSMENT — PATIENT HEALTH QUESTIONNAIRE - PHQ9
1. LITTLE INTEREST OR PLEASURE IN DOING THINGS: NOT AT ALL
SUM OF ALL RESPONSES TO PHQ9 QUESTIONS 1 AND 2: 0
2. FEELING DOWN, DEPRESSED OR HOPELESS: NOT AT ALL

## 2024-07-05 ASSESSMENT — ACTIVITIES OF DAILY LIVING (ADL)
DRESSING: INDEPENDENT
TAKING_MEDICATION: NEEDS ASSISTANCE
MANAGING_FINANCES: INDEPENDENT
DOING_HOUSEWORK: NEEDS ASSISTANCE
BATHING: INDEPENDENT
GROCERY_SHOPPING: NEEDS ASSISTANCE

## 2024-07-05 NOTE — SIGNIFICANT EVENT
Follow Up Phone Call    Outgoing phone call    Spoke to: Aly Mccarty Relationship:self   Phone number: [phone]      Outcome: contacted patient/ family   Chief Complaint   Patient presents with    Abdominal Pain    Nausea    Vomiting    Shortness of Breath          Diagnosis:Not applicable    States he is feeling better. Now on regular diet.  No further questions or concerns.

## 2024-07-05 NOTE — PATIENT INSTRUCTIONS
Cardiology - [phone]  Take furosemide first thing in am, you will need to self cath more frquently, every 2 hours or so at first  Derm referral placed  Labs in 7-10 days     6 months

## 2024-07-05 NOTE — PROGRESS NOTES
Discharge Facility: [facility]  Discharge Diagnosis: #Abdominal Pain secondary to Small Bowel Obstruction #Reducible paraumbilical hernia  Admission Date: 7/1/24  Discharge Date: 7/3/24    PCP Appointment Date: 7/5/24  Specialist Appointment Date: needs general surgery, hematology oncology  Hospital Encounter and Summary Linked: Yes    PCP appointment within 2 business days of discharge.

## 2024-07-05 NOTE — PROGRESS NOTES
Subjective   Reason for Visit: Aly Mccarty is an 74 y.o. male here for a Medicare Wellness visit.               HPI    Patient Care Team:  Luis Eduardo Kay DO as PCP - General  RICARDA Fatima-CNP as PCP - [facility] ACO Attributed Provider  Monica Matias RN as Care Manager (Case Management)     Review of Systems    Objective   Vitals:  There were no vitals taken for this visit.      Physical Exam    Assessment/Plan   Problem List Items Addressed This Visit    None  Visit Diagnoses     Routine general medical examination at health care facility    -  Primary

## 2024-07-09 LAB
BAND RESOLUTION: 400 BANDS
CHROM ANALY OVERALL INTERP-IMP: NORMAL
CHROMOSOME ANALYSIS CELLS ANALYZED: 20 CELLS
CHROMOSOME ANALYSIS CELLS IMAGED: 4 CELLS
CHROMOSOME ANALYSIS HYPERMODAL CELL COUNT: 0 CELLS
CHROMOSOME ANALYSIS HYPOMODAL CELL COUNT: 0 CELLS
CHROMOSOME ANALYSIS MODAL CHROMOSOME NO: 46 CHROMOSOMES
CHROMOSOME ANALYSIS STAINING METHOD: NORMAL
ELECTRONICALLY SIGNED BY CYTOGENETICS: NORMAL
KARYOTYP MAR: 2 CELLS
TOTAL CELLS COUNTED MAR: 20 CELLS

## 2024-07-10 LAB
Q ONSET: 223 MS
QRS COUNT: 11 BEATS
QRS DURATION: 118 MS
QT INTERVAL: 434 MS
QTC CALCULATION(BAZETT): 468 MS
QTC FREDERICIA: 457 MS
R AXIS: -45 DEGREES
T AXIS: 88 DEGREES
T OFFSET: 440 MS
VENTRICULAR RATE: 70 BPM

## 2024-07-17 ENCOUNTER — INFUSION (OUTPATIENT)
Dept: HEMATOLOGY/ONCOLOGY | Facility: CLINIC | Age: 74
End: 2024-07-17
Payer: MEDICARE

## 2024-07-17 ENCOUNTER — PATIENT OUTREACH (OUTPATIENT)
Dept: CARE COORDINATION | Facility: CLINIC | Age: 74
End: 2024-07-17

## 2024-07-17 VITALS
BODY MASS INDEX: 43.51 KG/M2 | TEMPERATURE: 96.8 F | WEIGHT: 315 LBS | DIASTOLIC BLOOD PRESSURE: 78 MMHG | RESPIRATION RATE: 17 BRPM | HEART RATE: 53 BPM | OXYGEN SATURATION: 94 % | SYSTOLIC BLOOD PRESSURE: 160 MMHG

## 2024-07-17 DIAGNOSIS — N18.4 ANEMIA DUE TO STAGE 4 CHRONIC KIDNEY DISEASE (MULTI): ICD-10-CM

## 2024-07-17 DIAGNOSIS — D63.1 ANEMIA DUE TO STAGE 4 CHRONIC KIDNEY DISEASE (MULTI): ICD-10-CM

## 2024-07-17 DIAGNOSIS — E03.9 HYPOTHYROIDISM, UNSPECIFIED TYPE: ICD-10-CM

## 2024-07-17 DIAGNOSIS — E87.1 HYPONATREMIA: ICD-10-CM

## 2024-07-17 LAB
ANION GAP SERPL CALC-SCNC: 13 MMOL/L (ref 10–20)
BASOPHILS # BLD AUTO: 0.03 X10*3/UL (ref 0–0.1)
BASOPHILS NFR BLD AUTO: 0.7 %
BUN SERPL-MCNC: 18 MG/DL (ref 6–23)
CALCIUM SERPL-MCNC: 9 MG/DL (ref 8.6–10.3)
CHLORIDE SERPL-SCNC: 103 MMOL/L (ref 98–107)
CO2 SERPL-SCNC: 26 MMOL/L (ref 21–32)
CREAT SERPL-MCNC: 1.5 MG/DL (ref 0.5–1.3)
EGFRCR SERPLBLD CKD-EPI 2021: 49 ML/MIN/1.73M*2
EOSINOPHIL # BLD AUTO: 0.14 X10*3/UL (ref 0–0.4)
EOSINOPHIL NFR BLD AUTO: 3.3 %
ERYTHROCYTE [DISTWIDTH] IN BLOOD BY AUTOMATED COUNT: 13.8 % (ref 11.5–14.5)
GLUCOSE SERPL-MCNC: 103 MG/DL (ref 74–99)
HCT VFR BLD AUTO: 37.4 % (ref 41–52)
HGB BLD-MCNC: 11.2 G/DL (ref 13.5–17.5)
IMM GRANULOCYTES # BLD AUTO: 0 X10*3/UL (ref 0–0.5)
IMM GRANULOCYTES NFR BLD AUTO: 0 % (ref 0–0.9)
LYMPHOCYTES # BLD AUTO: 0.86 X10*3/UL (ref 0.8–3)
LYMPHOCYTES NFR BLD AUTO: 20.1 %
MCH RBC QN AUTO: 32.9 PG (ref 26–34)
MCHC RBC AUTO-ENTMCNC: 29.9 G/DL (ref 32–36)
MCV RBC AUTO: 110 FL (ref 80–100)
MONOCYTES # BLD AUTO: 0.26 X10*3/UL (ref 0.05–0.8)
MONOCYTES NFR BLD AUTO: 6.1 %
NEUTROPHILS # BLD AUTO: 2.99 X10*3/UL (ref 1.6–5.5)
NEUTROPHILS NFR BLD AUTO: 69.8 %
PLATELET # BLD AUTO: 118 X10*3/UL (ref 150–450)
POTASSIUM SERPL-SCNC: 3.6 MMOL/L (ref 3.5–5.3)
RBC # BLD AUTO: 3.4 X10*6/UL (ref 4.5–5.9)
SODIUM SERPL-SCNC: 138 MMOL/L (ref 136–145)
TSH SERPL-ACNC: 2.56 MIU/L (ref 0.44–3.98)
WBC # BLD AUTO: 4.3 X10*3/UL (ref 4.4–11.3)

## 2024-07-17 PROCEDURE — 36415 COLL VENOUS BLD VENIPUNCTURE: CPT

## 2024-07-17 RX ORDER — EPINEPHRINE 0.3 MG/.3ML
0.3 INJECTION SUBCUTANEOUS EVERY 5 MIN PRN
OUTPATIENT
Start: 2024-07-31

## 2024-07-17 RX ORDER — DIPHENHYDRAMINE HYDROCHLORIDE 50 MG/ML
50 INJECTION INTRAMUSCULAR; INTRAVENOUS AS NEEDED
OUTPATIENT
Start: 2024-07-31

## 2024-07-17 RX ORDER — FAMOTIDINE 10 MG/ML
20 INJECTION INTRAVENOUS ONCE AS NEEDED
OUTPATIENT
Start: 2024-07-31

## 2024-07-17 RX ORDER — ALBUTEROL SULFATE 0.83 MG/ML
3 SOLUTION RESPIRATORY (INHALATION) AS NEEDED
OUTPATIENT
Start: 2024-07-31

## 2024-07-17 ASSESSMENT — PAIN SCALES - GENERAL: PAINLEVEL: 0-NO PAIN

## 2024-07-17 NOTE — PROGRESS NOTES
Unable to reach patient for call back after patient's follow up appointment with PCP.   JAVIM with call back number for patient to call if needed   If no voicemail available call attempts x 2 were made to contact the patient to assist with any questions or concerns patient may have.

## 2024-07-17 NOTE — PROGRESS NOTES
Does not require epo today due to hgb of 11.2, notified david due to current provider being off today.

## 2024-07-19 DIAGNOSIS — E03.8 OTHER SPECIFIED HYPOTHYROIDISM: ICD-10-CM

## 2024-07-21 RX ORDER — LEVOTHYROXINE SODIUM 88 UG/1
TABLET ORAL
Qty: 90 TABLET | Refills: 1 | Status: SHIPPED | OUTPATIENT
Start: 2024-07-21

## 2024-07-24 ENCOUNTER — APPOINTMENT (OUTPATIENT)
Dept: SURGERY | Facility: CLINIC | Age: 74
End: 2024-07-24
Payer: MEDICARE

## 2024-07-28 DIAGNOSIS — I50.32 CHRONIC DIASTOLIC CONGESTIVE HEART FAILURE (MULTI): ICD-10-CM

## 2024-07-29 RX ORDER — FUROSEMIDE 40 MG/1
40 TABLET ORAL DAILY
Qty: 90 TABLET | Refills: 1 | Status: SHIPPED | OUTPATIENT
Start: 2024-07-29 | End: 2024-08-02 | Stop reason: SDUPTHER

## 2024-07-31 ENCOUNTER — INFUSION (OUTPATIENT)
Dept: HEMATOLOGY/ONCOLOGY | Facility: CLINIC | Age: 74
End: 2024-07-31
Payer: MEDICARE

## 2024-07-31 ENCOUNTER — APPOINTMENT (OUTPATIENT)
Dept: SURGERY | Facility: CLINIC | Age: 74
End: 2024-07-31
Payer: MEDICARE

## 2024-07-31 VITALS
SYSTOLIC BLOOD PRESSURE: 115 MMHG | OXYGEN SATURATION: 94 % | HEART RATE: 46 BPM | WEIGHT: 315 LBS | TEMPERATURE: 96.4 F | BODY MASS INDEX: 41.96 KG/M2 | DIASTOLIC BLOOD PRESSURE: 59 MMHG | RESPIRATION RATE: 17 BRPM

## 2024-07-31 VITALS — BODY MASS INDEX: 41.89 KG/M2 | WEIGHT: 315 LBS | OXYGEN SATURATION: 90 % | HEART RATE: 55 BPM

## 2024-07-31 DIAGNOSIS — D63.1 ANEMIA DUE TO STAGE 4 CHRONIC KIDNEY DISEASE (MULTI): ICD-10-CM

## 2024-07-31 DIAGNOSIS — N18.4 ANEMIA DUE TO STAGE 4 CHRONIC KIDNEY DISEASE (MULTI): ICD-10-CM

## 2024-07-31 DIAGNOSIS — K56.691 OTHER COMPLETE INTESTINAL OBSTRUCTION (MULTI): ICD-10-CM

## 2024-07-31 LAB
BASOPHILS # BLD AUTO: 0.03 X10*3/UL (ref 0–0.1)
BASOPHILS NFR BLD AUTO: 0.5 %
EOSINOPHIL # BLD AUTO: 0.31 X10*3/UL (ref 0–0.4)
EOSINOPHIL NFR BLD AUTO: 5.4 %
ERYTHROCYTE [DISTWIDTH] IN BLOOD BY AUTOMATED COUNT: 13.3 % (ref 11.5–14.5)
HCT VFR BLD AUTO: 37.1 % (ref 41–52)
HGB BLD-MCNC: 11.6 G/DL (ref 13.5–17.5)
IMM GRANULOCYTES # BLD AUTO: 0.01 X10*3/UL (ref 0–0.5)
IMM GRANULOCYTES NFR BLD AUTO: 0.2 % (ref 0–0.9)
LYMPHOCYTES # BLD AUTO: 1.07 X10*3/UL (ref 0.8–3)
LYMPHOCYTES NFR BLD AUTO: 18.6 %
MCH RBC QN AUTO: 32.1 PG (ref 26–34)
MCHC RBC AUTO-ENTMCNC: 31.3 G/DL (ref 32–36)
MCV RBC AUTO: 103 FL (ref 80–100)
MONOCYTES # BLD AUTO: 0.37 X10*3/UL (ref 0.05–0.8)
MONOCYTES NFR BLD AUTO: 6.4 %
NEUTROPHILS # BLD AUTO: 3.96 X10*3/UL (ref 1.6–5.5)
NEUTROPHILS NFR BLD AUTO: 68.9 %
PLATELET # BLD AUTO: 105 X10*3/UL (ref 150–450)
RBC # BLD AUTO: 3.61 X10*6/UL (ref 4.5–5.9)
WBC # BLD AUTO: 5.8 X10*3/UL (ref 4.4–11.3)

## 2024-07-31 PROCEDURE — 99214 OFFICE O/P EST MOD 30 MIN: CPT | Performed by: SURGERY

## 2024-07-31 PROCEDURE — 4010F ACE/ARB THERAPY RXD/TAKEN: CPT | Performed by: SURGERY

## 2024-07-31 PROCEDURE — 3060F POS MICROALBUMINURIA REV: CPT | Performed by: SURGERY

## 2024-07-31 PROCEDURE — 36415 COLL VENOUS BLD VENIPUNCTURE: CPT

## 2024-07-31 PROCEDURE — 1157F ADVNC CARE PLAN IN RCRD: CPT | Performed by: SURGERY

## 2024-07-31 PROCEDURE — 1111F DSCHRG MED/CURRENT MED MERGE: CPT | Performed by: SURGERY

## 2024-07-31 RX ORDER — ALBUTEROL SULFATE 0.83 MG/ML
3 SOLUTION RESPIRATORY (INHALATION) AS NEEDED
OUTPATIENT
Start: 2024-08-14

## 2024-07-31 RX ORDER — FAMOTIDINE 10 MG/ML
20 INJECTION INTRAVENOUS ONCE AS NEEDED
OUTPATIENT
Start: 2024-08-14

## 2024-07-31 RX ORDER — EPINEPHRINE 0.3 MG/.3ML
0.3 INJECTION SUBCUTANEOUS EVERY 5 MIN PRN
OUTPATIENT
Start: 2024-08-14

## 2024-07-31 RX ORDER — DIPHENHYDRAMINE HYDROCHLORIDE 50 MG/ML
50 INJECTION INTRAMUSCULAR; INTRAVENOUS AS NEEDED
OUTPATIENT
Start: 2024-08-14

## 2024-07-31 ASSESSMENT — PAIN SCALES - GENERAL: PAINLEVEL: 0-NO PAIN

## 2024-07-31 NOTE — PROGRESS NOTES
General Surgery Follow-Up Note    Referring Provider:   DO Cora Newman Daniel R, DO      Chief Complaint:  Umbilical hernia      History of Present Illness:  This is a 74 y.o. male who presents for umbilical hernia.  He was last seen 1 month ago.  At that time he was admitted to the hospital for partial bowel obstruction from his umbilical hernia.  He reports that he has quit drinking alcohol for the past 3 weeks.  He denies any further umbilical hernia issues.  He denies any problems eating or drinking.  He denies any problems with bowel movements.  He denies any bleeding issues.      Vitals:   Vitals:    07/31/24 1312   Pulse: 55   SpO2: 90%   Weight: 148 kg (326 lb 4.5 oz)         Physical Exam:  General: No acute distress. Sitting up in bed.   Neuro: Alert and oriented ×3. Follows commands.  Head: Atraumatic  Eyes: Pupils equal reactive to light. Extraocular motions intact.  Ears: Hears normal speaking voice.  Mouth, Nose, Throat: Mucous membranes moist.  Normal dentition.  Neck: Supple. No appreciable masses.  Chest: No crepitus.  No appreciable scars.  Heart: Regular rate and rhythm.  Lung: Clear to auscultation bilaterally.  Vascular: No carotid bruits.  Palpable radial pulses bilaterally.  Abdomen: Soft. Nondistended. Nontender.  Morbidly obese. Small reducible umbilical hernia.  Musculoskeletal: Moves all extremities.  Normal range of motion.  Lymphatic: No palpable lymph nodes.  Skin: No rashes or lesions.  Psychological: Normal affect    Labs:  CBC:   Lab Results   Component Value Date    WBC 5.8 07/31/2024    RBC 3.61 (L) 07/31/2024    HGB 11.6 (L) 07/31/2024    HCT 37.1 (L) 07/31/2024     (L) 07/31/2024       RFP:   Lab Results   Component Value Date     07/17/2024    K 3.6 07/17/2024     07/17/2024    CO2 26 07/17/2024    BUN 18 07/17/2024    CREATININE 1.50 (H) 07/17/2024    CALCIUM 9.0 07/17/2024    MG 1.88 07/03/2024    PHOS 3.0 07/03/2024        LFTs:   Lab Results    Component Value Date    PROT 6.2 (L) 07/03/2024    ALBUMIN 3.5 07/03/2024    BILITOT 0.8 07/03/2024    BILIDIR 0.4 (H) 07/01/2024    ALKPHOS 101 07/03/2024    AST 16 07/03/2024    ALT 18 07/03/2024            Imaging:  No new      Assessment:  This is a 74 y.o.-year-old male who presents for a symptomatic umbilical hernia.  We discussed that his aortic valve repair and alcoholic thrombocytopenia put him at risk for surgery, but I believe it is not prohibitively high.      Plan:  -- We will plan for an open umbilical hernia repair with preoperitoneal mesh.  -- We will plan for surgery to be performed as an outpatient.  -- We will obtain preoperative labwork including CBC, RFP, Magnesium, LFTs, INR, and Type and Screen.  -- We will obtain a preoperative chest X-ray and EKG.  -- We will obtain Preadmission Testing (PAT).  -- We will apply Dermabond, so the patient may shower the day after surgery. No swimming or tub soaks for 2 weeks post-operatively.  -- No heavy lifting for 6 weeks after surgery.      Octavio Araujo MD  General Surgery  Office: [phone]  Fax: [phone]  1:33 PM  07/31/24        Past Medical History:  Past Medical History:   Diagnosis Date    Arthritis     Disease of thyroid gland     History of transfusion     Hypertension     Personal history of diseases of the blood and blood-forming organs and certain disorders involving the immune mechanism     History of hemorrhagic diathesis    Personal history of other diseases of the circulatory system     History of hypertension    Personal history of other diseases of the circulatory system 12/22/2022    History of aortic valve stenosis    Personal history of other diseases of the circulatory system 10/10/2021    History of chronic atrial fibrillation    Personal history of other diseases of the musculoskeletal system and connective tissue     History of spinal stenosis    Personal history of other diseases of the musculoskeletal system and  connective tissue     History of arthritis    Personal history of other diseases of the nervous system and sense organs     History of sleep apnea        Past Surgical History:  Past Surgical History:   Procedure Laterality Date    ABDOMINAL SURGERY      CARDIAC SURGERY      EYE SURGERY      FRACTURE SURGERY      OTHER SURGICAL HISTORY  08/08/2019    PICC line insertion    OTHER SURGICAL HISTORY  08/08/2019    Leg surgery    OTHER SURGICAL HISTORY  07/07/2021    Tonsillectomy with adenoidectomy    TONSILLECTOMY      VASCULAR SURGERY          Medications:  Current Outpatient Medications   Medication Instructions    acetaminophen (Tylenol) 500 mg tablet Take 1-2 by mouth every 6 hours as needed for pain    allopurinol (ZYLOPRIM) 100 mg, oral, Daily    B complex-vitamin C-folic acid (Nephro-Chi Rx) 1- mg-mg-mcg tablet 1 tablet, oral, Daily with breakfast    carvedilol (COREG) 25 mg, oral, 2 times daily (morning and late afternoon), Take with food.    cetirizine (ZYRTEC) 10 mg, oral, Every evening    cyanocobalamin (VITAMIN B-12) 2,000 mcg, oral, Every other day    folic acid (FOLVITE) 1 mg, oral, Daily    furosemide (LASIX) 40 mg, oral, Daily    hydrALAZINE (APRESOLINE) 50 mg, oral, 2 times daily    levothyroxine (Synthroid, Levoxyl) 88 mcg tablet TAKE 1 TABLET BY MOUTH EVERY DAY ON EMPTY STOMACH    lisinopril 40 mg, oral, Daily    phenazopyridine (PYRIDIUM) 200 mg, oral, 3 times daily (morning, midday, late afternoon)    predniSONE (Deltasone) 10 mg tablet PLEASE SEE ATTACHED FOR DETAILED DIRECTIONS    tamsulosin (FLOMAX) 0.4 mg, oral, Daily    thiamine (VITAMIN B-1) 100 mg, oral, Daily    Xarelto 20 mg, oral, Daily with evening meal        Allergies:  Allergies   Allergen Reactions    Levofloxacin Angioedema and Hives        Family History:  Family History   Problem Relation Name Age of Onset    Hypertension Mother      Breast cancer Mother      Kidney disease Father          CKD    Peripheral vascular disease  Father      Cancer Sibling          Social History:  Social History     Socioeconomic History    Marital status:    Tobacco Use    Smoking status: Never    Smokeless tobacco: Never   Vaping Use    Vaping status: Never Used   Substance and Sexual Activity    Alcohol use: Yes     Alcohol/week: 21.0 standard drinks of alcohol     Types: 21 Standard drinks or equivalent per week    Drug use: Yes     Types: Marijuana     Comment: gummies    Sexual activity: Defer     Social Determinants of Health     Financial Resource Strain: Low Risk  (7/1/2024)    Overall Financial Resource Strain (CARDIA)     Difficulty of Paying Living Expenses: Not hard at all   Transportation Needs: No Transportation Needs (7/1/2024)    PRAPARE - Transportation     Lack of Transportation (Medical): No     Lack of Transportation (Non-Medical): No   Housing Stability: Low Risk  (7/1/2024)    Housing Stability Vital Sign     Unable to Pay for Housing in the Last Year: No     Number of Places Lived in the Last Year: 1     Unstable Housing in the Last Year: No        Review of Systems:  A complete 12 point review of systems was performed and is negative except as noted in the history of present illness.

## 2024-07-31 NOTE — PROGRESS NOTES
Pt did not require treatment today. Denies questions, concerns, or comments at this time. Discharged home in wheelchair

## 2024-08-01 ENCOUNTER — HOSPITAL ENCOUNTER (OUTPATIENT)
Facility: HOSPITAL | Age: 74
Setting detail: OUTPATIENT SURGERY
End: 2024-08-01
Attending: SURGERY | Admitting: SURGERY
Payer: MEDICARE

## 2024-08-01 DIAGNOSIS — R10.84 GENERALIZED ABDOMINAL PAIN: ICD-10-CM

## 2024-08-01 DIAGNOSIS — Z01.818 PRE-OPERATIVE EXAM: ICD-10-CM

## 2024-08-01 DIAGNOSIS — K42.9 UMBILICAL HERNIA WITHOUT OBSTRUCTION OR GANGRENE: ICD-10-CM

## 2024-08-02 ENCOUNTER — OFFICE VISIT (OUTPATIENT)
Dept: CARDIOLOGY | Facility: HOSPITAL | Age: 74
End: 2024-08-02
Payer: MEDICARE

## 2024-08-02 VITALS
WEIGHT: 315 LBS | HEART RATE: 47 BPM | SYSTOLIC BLOOD PRESSURE: 90 MMHG | DIASTOLIC BLOOD PRESSURE: 50 MMHG | OXYGEN SATURATION: 94 % | HEIGHT: 74 IN | BODY MASS INDEX: 40.43 KG/M2

## 2024-08-02 DIAGNOSIS — I10 ESSENTIAL (PRIMARY) HYPERTENSION: ICD-10-CM

## 2024-08-02 DIAGNOSIS — I50.32 CHRONIC DIASTOLIC CONGESTIVE HEART FAILURE (MULTI): ICD-10-CM

## 2024-08-02 DIAGNOSIS — I48.21 PERMANENT ATRIAL FIBRILLATION (MULTI): Primary | ICD-10-CM

## 2024-08-02 PROCEDURE — 3060F POS MICROALBUMINURIA REV: CPT | Performed by: NURSE PRACTITIONER

## 2024-08-02 PROCEDURE — 99204 OFFICE O/P NEW MOD 45 MIN: CPT | Performed by: NURSE PRACTITIONER

## 2024-08-02 PROCEDURE — 1159F MED LIST DOCD IN RCRD: CPT | Performed by: NURSE PRACTITIONER

## 2024-08-02 PROCEDURE — 3074F SYST BP LT 130 MM HG: CPT | Performed by: NURSE PRACTITIONER

## 2024-08-02 PROCEDURE — 1157F ADVNC CARE PLAN IN RCRD: CPT | Performed by: NURSE PRACTITIONER

## 2024-08-02 PROCEDURE — 1111F DSCHRG MED/CURRENT MED MERGE: CPT | Performed by: NURSE PRACTITIONER

## 2024-08-02 PROCEDURE — 4010F ACE/ARB THERAPY RXD/TAKEN: CPT | Performed by: NURSE PRACTITIONER

## 2024-08-02 PROCEDURE — 1036F TOBACCO NON-USER: CPT | Performed by: NURSE PRACTITIONER

## 2024-08-02 PROCEDURE — 3008F BODY MASS INDEX DOCD: CPT | Performed by: NURSE PRACTITIONER

## 2024-08-02 PROCEDURE — 3078F DIAST BP <80 MM HG: CPT | Performed by: NURSE PRACTITIONER

## 2024-08-02 PROCEDURE — 99214 OFFICE O/P EST MOD 30 MIN: CPT | Performed by: NURSE PRACTITIONER

## 2024-08-02 RX ORDER — FUROSEMIDE 40 MG/1
40 TABLET ORAL DAILY
Qty: 90 TABLET | Refills: 3 | Status: SHIPPED | OUTPATIENT
Start: 2024-08-02 | End: 2025-08-02

## 2024-08-02 RX ORDER — KETOCONAZOLE 20 MG/G
CREAM TOPICAL
COMMUNITY
Start: 2024-07-23

## 2024-08-02 RX ORDER — RIVAROXABAN 20 MG/1
20 TABLET, FILM COATED ORAL
Qty: 90 TABLET | Refills: 3 | Status: SHIPPED | OUTPATIENT
Start: 2024-08-02 | End: 2025-08-02

## 2024-08-02 RX ORDER — LISINOPRIL 40 MG/1
40 TABLET ORAL DAILY
Qty: 90 TABLET | Refills: 3 | Status: SHIPPED | OUTPATIENT
Start: 2024-08-02 | End: 2025-08-02

## 2024-08-02 RX ORDER — HYDRALAZINE HYDROCHLORIDE 50 MG/1
50 TABLET, FILM COATED ORAL 2 TIMES DAILY
Qty: 180 TABLET | Refills: 3 | Status: SHIPPED | OUTPATIENT
Start: 2024-08-02 | End: 2025-08-02

## 2024-08-02 RX ORDER — CARVEDILOL 25 MG/1
25 TABLET ORAL
Qty: 180 TABLET | Refills: 3 | Status: SHIPPED | OUTPATIENT
Start: 2024-08-02 | End: 2025-08-02

## 2024-08-02 RX ORDER — HYDROCHLOROTHIAZIDE 12.5 MG/1
12.5 CAPSULE ORAL DAILY
COMMUNITY
End: 2024-08-02

## 2024-08-02 RX ORDER — FLUCONAZOLE 150 MG/1
TABLET ORAL
COMMUNITY
Start: 2024-07-23

## 2024-08-02 ASSESSMENT — ENCOUNTER SYMPTOMS
HEMATOLOGIC/LYMPHATIC NEGATIVE: 1
DEPRESSION: 0
CONSTITUTIONAL NEGATIVE: 1
RESPIRATORY NEGATIVE: 1
PSYCHIATRIC NEGATIVE: 1
NEUROLOGICAL NEGATIVE: 1
EYES NEGATIVE: 1
MYALGIAS: 1
ENDOCRINE NEGATIVE: 1
GASTROINTESTINAL NEGATIVE: 1

## 2024-08-02 NOTE — PATIENT INSTRUCTIONS
CALL WITH ANY QUESTIONS   STOP THE hydrochlorothiazide   ECHOCARDIOGRAM   WILL CALL TO REVIEW THE RESULTS   FOLLOW UP IN SIX MONTHS

## 2024-08-02 NOTE — PROGRESS NOTES
Referred by Dr. Kay for New Patient Visit and Establish Care (Former Dr. Alcantar pt)     History Of Present Illness:   Dear Dr. Kay,     I had the pleasure of meeting mr. Mccarty today at [facility] to establish cardiac care. The patient is seen in collaboration with Dr. Hensley. Mr. Mccarty is a very pleasant 74 year old gentleman with a history of atrial fibrillation, HFpEF (LVEF 55-60%), HTN, aortic stenosis s/p TAVR and HLD, denies history of smoking. He is accompanied today by his wife. Overall states he has been feeling well. He was started on Lasix due to increased peripheral edema that has improved. Weight is down 25 pounds. He denies chest pain,shortness of breath or heart palpitations. Occasional lightheadedness.    Review of Systems   Constitutional: Negative.   HENT: Negative.     Eyes: Negative.    Respiratory: Negative.     Endocrine: Negative.    Hematologic/Lymphatic: Negative.    Skin: Negative.    Musculoskeletal:  Positive for muscle weakness and myalgias.   Gastrointestinal: Negative.    Neurological: Negative.    Psychiatric/Behavioral: Negative.          Past Medical History:  He has a past medical history of Arthritis, Disease of thyroid gland, History of transfusion, Hypertension, Personal history of diseases of the blood and blood-forming organs and certain disorders involving the immune mechanism, Personal history of other diseases of the circulatory system, Personal history of other diseases of the circulatory system (12/22/2022), Personal history of other diseases of the circulatory system (10/10/2021), Personal history of other diseases of the musculoskeletal system and connective tissue, Personal history of other diseases of the musculoskeletal system and connective tissue, and Personal history of other diseases of the nervous system and sense organs.    Past Surgical History:  He has a past surgical history that includes Other surgical history  (08/08/2019); Other surgical history (08/08/2019); Other surgical history (07/07/2021); Abdominal surgery; Eye surgery; Fracture surgery; Cardiac surgery; Tonsillectomy; and Vascular surgery.      Social History:  He reports that he has never smoked. He has never used smokeless tobacco. He reports current alcohol use of about 21.0 standard drinks of alcohol per week. He reports current drug use. Drug: Marijuana.    Family History:  Family History   Problem Relation Name Age of Onset    Hypertension Mother      Breast cancer Mother      Kidney disease Father          CKD    Peripheral vascular disease Father      Cancer Sibling          Allergies:  Levofloxacin    Outpatient Medications:  Current Outpatient Medications   Medication Instructions    acetaminophen (Tylenol) 500 mg tablet Take 1-2 by mouth every 6 hours as needed for pain    allopurinol (ZYLOPRIM) 100 mg, oral, Daily    B complex-vitamin C-folic acid (Nephro-Chi Rx) 1- mg-mg-mcg tablet 1 tablet, oral, Daily with breakfast    carvedilol (COREG) 25 mg, oral, 2 times daily (morning and late afternoon), Take with food.    cetirizine (ZYRTEC) 10 mg, oral, Every evening    cyanocobalamin (VITAMIN B-12) 2,000 mcg, oral, Every other day    fluconazole (Diflucan) 150 mg tablet TAKE ONCE WEEKLY FOR ONE MONTH    folic acid (FOLVITE) 1 mg, oral, Daily    furosemide (LASIX) 40 mg, oral, Daily    hydrALAZINE (APRESOLINE) 50 mg, oral, 2 times daily    hydroCHLOROthiazide (MICROZIDE) 12.5 mg, oral, Daily    ketoconazole (NIZOral) 2 % cream APPLY TO AFFECTED AREA EVERY MORNING TILL CLEAR, REPEAT AS NEEDED.    levothyroxine (Synthroid, Levoxyl) 88 mcg tablet TAKE 1 TABLET BY MOUTH EVERY DAY ON EMPTY STOMACH    lisinopril 40 mg, oral, Daily    phenazopyridine (PYRIDIUM) 200 mg, oral, 3 times daily (morning, midday, late afternoon)    predniSONE (Deltasone) 10 mg tablet if needed.    tamsulosin (FLOMAX) 0.4 mg, oral, Daily    thiamine (VITAMIN B-1) 100 mg, oral,  "Daily    Xarelto 20 mg, oral, Daily with evening meal        Last Recorded Vitals:  Vitals:    08/02/24 1043   BP: 90/50   BP Location: Left arm   Patient Position: Sitting   BP Cuff Size: Large adult   Pulse: (!) 47   SpO2: 94%   Weight: 148 kg (326 lb)   Height: 1.88 m (6' 2\")       Physical Exam:  Physical Exam  Vitals reviewed.   HENT:      Head: Normocephalic.      Nose: Nose normal.   Eyes:      Pupils: Pupils are equal, round, and reactive to light.   Cardiovascular:      Rate and Rhythm: Irregularly Irregular   Pulmonary:      Effort: Pulmonary effort is normal.      Breath sounds: Normal breath sounds.   Abdominal:      General: Abdomen is flat.      Palpations: Abdomen is soft.   Musculoskeletal:         General: Normal range of motion.      Cervical back: Normal range of motion.   Skin:     General: Skin is warm and dry.   Neurological:      General: No focal deficit present.      Mental Status: He is alert and oriented to person, place, and time.   Psychiatric:         Mood and Affect: Mood normal.            Last Labs:  CBC -  Lab Results   Component Value Date    WBC 5.8 07/31/2024    HGB 11.6 (L) 07/31/2024    HCT 37.1 (L) 07/31/2024     (H) 07/31/2024     (L) 07/31/2024       CMP -  Lab Results   Component Value Date    CALCIUM 9.0 07/17/2024    PHOS 3.0 07/03/2024    PROT 6.2 (L) 07/03/2024    ALBUMIN 3.5 07/03/2024    AST 16 07/03/2024    ALT 18 07/03/2024    ALKPHOS 101 07/03/2024    BILITOT 0.8 07/03/2024       LIPID PANEL -   Lab Results   Component Value Date    CHOL 181 07/12/2023    TRIG 119 07/12/2023    HDL 47.4 07/12/2023    CHHDL 3.8 07/12/2023    LDLF 110 (H) 07/12/2023    VLDL 24 07/12/2023       RENAL FUNCTION PANEL -   Lab Results   Component Value Date    GLUCOSE 103 (H) 07/17/2024     07/17/2024    K 3.6 07/17/2024     07/17/2024    CO2 26 07/17/2024    ANIONGAP 13 07/17/2024    BUN 18 07/17/2024    CREATININE 1.50 (H) 07/17/2024    GFRMALE 37 (A) " 08/24/2023    CALCIUM 9.0 07/17/2024    PHOS 3.0 07/03/2024    ALBUMIN 3.5 07/03/2024        Lab Results   Component Value Date     (H) 07/01/2024    HGBA1C 4.8 07/12/2023       Last Cardiology Tests:  ECG:    Echo:  Echocardiogram 12/16/2022  1. Left ventricular systolic function is normal with a 60-65% estimated ejection fraction.   2. Spectral Doppler shows an abnormal pattern of left ventricular diastolic filling.   3. The left atrium is moderate to severely dilated.   4. Aortic valve appears abnormal.   5. There is a transcatheter aortic valve replacement, functioning normally with trivial aortic regurgitation.   6. Mild aortic valve regurgitation.   7. Poorly visualized anatomical structures due to suboptimal image quality.     Echocardiogram 11/22/022  1. Left ventricular systolic function is normal with a 55-60% estimated ejection fraction.   2. Post TAVR - no pericardial effusion, normal gradients and mild kaye prosthetic regurg.   3. Poorly visualized anatomical structures due to suboptimal image quality.   4. The left atrium is enlarged.   5. There is moderate mitral annular calcification.   6. Aortic valve appears abnormal.   7. Severe aortic valve stenosis.   8. There is severe aortic valve cusp calcification.   9. The patient is in atrial fibrillation which may influence the estimate of left ventricular function and transvalvular flows.    CLARENCE 11/2/2022  1. Left ventricular systolic function is normal with a 65-70% estimated ejection fraction.   2. Spectral Doppler shows a restrictive pattern of left ventricular diastolic filling.   3. There is moderate concentric left ventricular hypertrophy.   4. The left atrium is moderately dilated.   5. There is a small pedunculated mobile echodense structure attached to the anterior wall of the left atrial appendage. It is not typical of a athrombus.   6. Moderate tricuspid regurgitation visualized.   7. Severe aortic valve stenosis.   8. There is moderate  aortic valve cusp calcification.   9. Mild aortic valve regurgitation.  10. No left atrial mass.  11. No left atrial thrombus.  12. Small patent foramen ovale.  13. There is plaque visualized in the descending aorta.     Echocardiogram 5/28/2017   1. The left ventricular systolic function is normal with a 55% estimated ejection fraction.   2. Spectral Doppler shows an impaired relaxation pattern of left ventricular diastolic filling.   3. Moderate aortic valve stenosis.    Ejection Fractions:  LVEF 60-65%  Cath:  Heart cath 10/24/2022  1. Non obstructive CAD.   Stress Test:    Cardiac Imaging:  TAVR 11/22/2022  1. Transcatheter aortic valve replacement with successful implantation of an Colilns Sapien3 29 mm valve.   2. Patient was enrolled in a research study and data was included in the TVT Registry.    Assessment/Plan   Mr. Mccarty is a very pleasant 74 year old gentleman with a history of aortic stenosis s/p TAVR (11/2022), HTN, HLD, atrial fibrillation (Xarelto) and HFpEF, he is here to establish cardiac care. Recently started on Lasix due to increased peripheral edema. Swelling has resolved. He will have a repeat echocardiogram to evaluate his LV function. He complains of lightheadedness, the hydrochlorothiazide will be stopped. Heart rate and blood pressure are well controlled today    Plan   -call with any questions   -stop the hydrochlorothiazide   -echocardiogram   -will call to review the results   -follow up in six months   -continue Carvedilol, Hydralazine, Lisinopril and Xarelto   -continue Lasix     I appreciate the opportunity to participate in the patient's care, please call with any questions         RICARDA Mancilla-CNP

## 2024-08-14 ENCOUNTER — INFUSION (OUTPATIENT)
Dept: HEMATOLOGY/ONCOLOGY | Facility: CLINIC | Age: 74
End: 2024-08-14
Payer: MEDICARE

## 2024-08-14 ENCOUNTER — OFFICE VISIT (OUTPATIENT)
Dept: HEMATOLOGY/ONCOLOGY | Facility: CLINIC | Age: 74
End: 2024-08-14
Payer: MEDICARE

## 2024-08-14 VITALS
OXYGEN SATURATION: 95 % | TEMPERATURE: 96.8 F | BODY MASS INDEX: 42.94 KG/M2 | DIASTOLIC BLOOD PRESSURE: 50 MMHG | HEART RATE: 50 BPM | WEIGHT: 315 LBS | RESPIRATION RATE: 16 BRPM | SYSTOLIC BLOOD PRESSURE: 116 MMHG

## 2024-08-14 DIAGNOSIS — R10.84 GENERALIZED ABDOMINAL PAIN: ICD-10-CM

## 2024-08-14 DIAGNOSIS — N28.9 KIDNEY DISEASE: ICD-10-CM

## 2024-08-14 DIAGNOSIS — D63.1 ANEMIA DUE TO STAGE 4 CHRONIC KIDNEY DISEASE (MULTI): ICD-10-CM

## 2024-08-14 DIAGNOSIS — M10.00 IDIOPATHIC GOUT, UNSPECIFIED CHRONICITY, UNSPECIFIED SITE: ICD-10-CM

## 2024-08-14 DIAGNOSIS — I10 ESSENTIAL (PRIMARY) HYPERTENSION: ICD-10-CM

## 2024-08-14 DIAGNOSIS — N18.4 ANEMIA DUE TO STAGE 4 CHRONIC KIDNEY DISEASE (MULTI): Primary | ICD-10-CM

## 2024-08-14 DIAGNOSIS — N18.4 ANEMIA DUE TO STAGE 4 CHRONIC KIDNEY DISEASE (MULTI): ICD-10-CM

## 2024-08-14 DIAGNOSIS — D61.818 PANCYTOPENIA (MULTI): ICD-10-CM

## 2024-08-14 DIAGNOSIS — D50.9 IRON DEFICIENCY ANEMIA, UNSPECIFIED IRON DEFICIENCY ANEMIA TYPE: ICD-10-CM

## 2024-08-14 DIAGNOSIS — N18.32 CHRONIC KIDNEY DISEASE, STAGE 3B (MULTI): ICD-10-CM

## 2024-08-14 DIAGNOSIS — D63.1 ANEMIA DUE TO STAGE 4 CHRONIC KIDNEY DISEASE (MULTI): Primary | ICD-10-CM

## 2024-08-14 DIAGNOSIS — I12.9 HYPERTENSIVE CHRONIC KIDNEY DISEASE WITH STAGE 1 THROUGH STAGE 4 CHRONIC KIDNEY DISEASE, OR UNSPECIFIED CHRONIC KIDNEY DISEASE: ICD-10-CM

## 2024-08-14 LAB
ABO GROUP (TYPE) IN BLOOD: NORMAL
ALBUMIN SERPL BCP-MCNC: 3.9 G/DL (ref 3.4–5)
ALP SERPL-CCNC: 94 U/L (ref 33–136)
ALT SERPL W P-5'-P-CCNC: 9 U/L (ref 10–52)
ANION GAP SERPL CALC-SCNC: 12 MMOL/L (ref 10–20)
ANTIBODY SCREEN: NORMAL
AST SERPL W P-5'-P-CCNC: 14 U/L (ref 9–39)
BASOPHILS # BLD AUTO: 0.02 X10*3/UL (ref 0–0.1)
BASOPHILS NFR BLD AUTO: 0.4 %
BILIRUB DIRECT SERPL-MCNC: 0.2 MG/DL (ref 0–0.3)
BILIRUB SERPL-MCNC: 0.6 MG/DL (ref 0–1.2)
BUN SERPL-MCNC: 49 MG/DL (ref 6–23)
CALCIUM SERPL-MCNC: 8.6 MG/DL (ref 8.6–10.3)
CHLORIDE SERPL-SCNC: 104 MMOL/L (ref 98–107)
CO2 SERPL-SCNC: 20 MMOL/L (ref 21–32)
CREAT SERPL-MCNC: 2.34 MG/DL (ref 0.5–1.3)
EGFRCR SERPLBLD CKD-EPI 2021: 29 ML/MIN/1.73M*2
EOSINOPHIL # BLD AUTO: 0.32 X10*3/UL (ref 0–0.4)
EOSINOPHIL NFR BLD AUTO: 7.2 %
ERYTHROCYTE [DISTWIDTH] IN BLOOD BY AUTOMATED COUNT: 14.1 % (ref 11.5–14.5)
GLUCOSE SERPL-MCNC: 104 MG/DL (ref 74–99)
HCT VFR BLD AUTO: 33.6 % (ref 41–52)
HGB BLD-MCNC: 10.4 G/DL (ref 13.5–17.5)
IMM GRANULOCYTES # BLD AUTO: 0.01 X10*3/UL (ref 0–0.5)
IMM GRANULOCYTES NFR BLD AUTO: 0.2 % (ref 0–0.9)
INR PPP: 1.7 (ref 0.9–1.1)
LYMPHOCYTES # BLD AUTO: 0.91 X10*3/UL (ref 0.8–3)
LYMPHOCYTES NFR BLD AUTO: 20.4 %
MAGNESIUM SERPL-MCNC: 2.32 MG/DL (ref 1.6–2.4)
MCH RBC QN AUTO: 31.8 PG (ref 26–34)
MCHC RBC AUTO-ENTMCNC: 31 G/DL (ref 32–36)
MCV RBC AUTO: 103 FL (ref 80–100)
MONOCYTES # BLD AUTO: 0.39 X10*3/UL (ref 0.05–0.8)
MONOCYTES NFR BLD AUTO: 8.7 %
NEUTROPHILS # BLD AUTO: 2.82 X10*3/UL (ref 1.6–5.5)
NEUTROPHILS NFR BLD AUTO: 63.1 %
PHOSPHATE SERPL-MCNC: 4.5 MG/DL (ref 2.5–4.9)
PLATELET # BLD AUTO: 101 X10*3/UL (ref 150–450)
POTASSIUM SERPL-SCNC: 4.6 MMOL/L (ref 3.5–5.3)
PROT SERPL-MCNC: 6.5 G/DL (ref 6.4–8.2)
PROTHROMBIN TIME: 19.5 SECONDS (ref 9.8–12.8)
RBC # BLD AUTO: 3.27 X10*6/UL (ref 4.5–5.9)
RH FACTOR (ANTIGEN D): NORMAL
SODIUM SERPL-SCNC: 131 MMOL/L (ref 136–145)
WBC # BLD AUTO: 4.5 X10*3/UL (ref 4.4–11.3)

## 2024-08-14 PROCEDURE — 1159F MED LIST DOCD IN RCRD: CPT

## 2024-08-14 PROCEDURE — 96372 THER/PROPH/DIAG INJ SC/IM: CPT

## 2024-08-14 PROCEDURE — 36415 COLL VENOUS BLD VENIPUNCTURE: CPT

## 2024-08-14 PROCEDURE — 3074F SYST BP LT 130 MM HG: CPT

## 2024-08-14 PROCEDURE — 1157F ADVNC CARE PLAN IN RCRD: CPT

## 2024-08-14 PROCEDURE — 6350000001 HC RX 635 EPOETIN >10,000 UNITS: Mod: JG,EC

## 2024-08-14 PROCEDURE — 1126F AMNT PAIN NOTED NONE PRSNT: CPT

## 2024-08-14 PROCEDURE — 1160F RVW MEDS BY RX/DR IN RCRD: CPT

## 2024-08-14 PROCEDURE — 3078F DIAST BP <80 MM HG: CPT

## 2024-08-14 PROCEDURE — 4010F ACE/ARB THERAPY RXD/TAKEN: CPT

## 2024-08-14 PROCEDURE — 99214 OFFICE O/P EST MOD 30 MIN: CPT

## 2024-08-14 PROCEDURE — 3060F POS MICROALBUMINURIA REV: CPT

## 2024-08-14 RX ORDER — ALBUTEROL SULFATE 0.83 MG/ML
3 SOLUTION RESPIRATORY (INHALATION) AS NEEDED
OUTPATIENT
Start: 2024-08-28

## 2024-08-14 RX ORDER — FAMOTIDINE 10 MG/ML
20 INJECTION INTRAVENOUS ONCE AS NEEDED
OUTPATIENT
Start: 2024-08-28

## 2024-08-14 RX ORDER — EPINEPHRINE 0.3 MG/.3ML
0.3 INJECTION SUBCUTANEOUS EVERY 5 MIN PRN
OUTPATIENT
Start: 2024-08-28

## 2024-08-14 RX ORDER — DIPHENHYDRAMINE HYDROCHLORIDE 50 MG/ML
50 INJECTION INTRAMUSCULAR; INTRAVENOUS AS NEEDED
OUTPATIENT
Start: 2024-08-28

## 2024-08-14 ASSESSMENT — PAIN SCALES - GENERAL: PAINLEVEL: 0-NO PAIN

## 2024-08-14 NOTE — PROGRESS NOTES
Patient Visit Information:   Visit Type: Follow Up Visit      History of Present Illness:   Hematology History:  Patient is a 74-year-old male that presents with his wife, Sole, for follow up of pancytopenia. Patient presents in a wheelchair.  Outside lab records indicate pancytopenia ongoing since 2014.  Wife states he has been hospitalized in 2017 for over 5 months for osteomyelitis and septicemia.  He has chronic urinary tract infections. Last imaging CT chest abdomen and pelvis in 2022 reveal CAD, atherosclerosis, calcified aortic valve, pulmonary hypertension, dilated pulmonary artery and hernia.    Head CT in 2021 revealed maxillary sinus thickening but no acute issues.   It was also noted in 2017 that he had internal bleeding and a colonoscopy and EGD was performed in which the patient states has resolved. Platelet and blood transfusions administered.    Bone Marrow Biopsy  FINAL DIAGNOSIS 6/6/2024   A-C. BONE MARROW CLOT, CORE BIOPSY, AND ASPIRATE SMEAR, RIGHT ILIAC CREST:   -- SLIGHTLY HYPERCELLULAR BONE MARROW FOR AGE (50-60%) WITH MATURING TRILINEAGE HEMATOPOIESIS.   There is no definite morphologic, immunophenotypic and molecular evidence of myeloid neoplasm in the given sample. The possible etiologies for persistent cytopenias includes but not limited to infectious, autoimmune, medication induced or toxin related causes.   Here to discuss bone marrow biopsy that shows likely toxin chronic infection related.  Hypercellular bone marrow though no dysplasia, no definite morphologic, immunophenotypic and molecular evidence of myeloid neoplasm in the given sample. The possible etiologies for persistent cytopenias includes but not limited to infectious, autoimmune, medication induced or toxin related causes. He plans on decreasing alcohol intake slowly to eventually quit in the next few weeks.  Advised to discuss this with his primary care.  Encouraged him to quit drinking appropriately.  Additionally,  recurrent urinary tract infections.  Anemia persists.  Discussed blood pressure readings patient states this is due to whitecoat syndrome and that his blood pressure is 120s/80s at home.    ID Statement:    PAMELA CANCINO is a 74 year old Male        Chief Complaint: Follow up for pancytopenia with procrit injection    Interval History:      He presents today in WC with wife, Sole.  Reports improvement over the last 4 weeks.  He has quit drinking heavy liquor and it has been about 4 weeks.  He notes more energy and interest in doing things.  Plan for surgery August 27, 2024 to repair umbilicus hernia, in the beginning of July he was admitted for small bowel obstruction related to the hernia incarceration.    Appetite good no unintentional weight loss.  Hydrating with fluid.    He denies any headache, lymphadenopathy, fever, night sweats, shortness of breath, chest pain or palpitations, abdominal pain, constipation, diarrhea, nausea, vomiting, bloody stool or dark stool. Urinary issues are chronic and include dysuria, frequency and urgency.  Recently saw Nephrology who reports kidney function stable CKD. Nocturia continues. He has sleep apnea and is on a CPAP.  Patient denies any issues with appetite, or weight loss.     Imaging Studies:  US abdomen on 08/31/2023 shows calculi vs sludge in gallbladder, hepatomegaly with similar appearance of hepatic cyst in left lobe of liver. Previous CT abdomen 04/11/2014 shows fatty liver, cyst in left lobe, portal HTN, likely cirrhosis of the liver. Borderline splenomegaly. Repeat CT abdomen 07/22/2018 shows hepatic cysts in left lobe, suggest benign etiology. Renal US 05/29/2017 and 07/22/2020, both unremarkable. On 06/12/2019 Ct Pelvis shows umbilical hernia and no prostate abscess. US renal complete 1/17/2024 Essentially negative renal ultrasound.        Medical history:  -Hypertension  -Aortic valve stenosis  -Chronic UTI  -A-fib on Xarelto  -Hearing loss  sensorineural  -Dizziness and vertigo  -Erectile dysfunction  -GERD  -Idiopathic neuropathy  -Back pain  -Obesity  -Peptic ulcer hemorrhage  -Vitamin D3 def  -Vitamin B12  -Tinnitus   -Anemia as above  -Pancytopenia as above     Social History:  - lives with wife Sole and two children (boy and girl)   -retired  -Most recent alcohol intake was 4 weeks ago has decreased his heavy liquor.  -Denies smoking or suing tobacco   -Occasional Marijuana use  -No illicit drug uses      Family History:  -mom  52 years breast cancer  -dad  84 years vascular disease   -Niece leukemia  as a teenager  -Sister with hypertension  -Daughter immune disease  -Brother myocardial infarction      Review of Systems:   System Review-All other systems including Neurologic, Cardiac, Gastrointestinal, Endocrine, Dermatologic, ENT, Respiratory, Infectious, Urologic, Musculoskeletal  have been reviewed and are negative for complaint outside of events noted below and within the assessment.         Allergies and Intolerances:       Allergies   Allergen Reactions    Levofloxacin Angioedema and Hives      Outpatient Medication Profile:  Current Outpatient Medications   Medication Instructions    acetaminophen (Tylenol) 500 mg tablet Take 1-2 by mouth every 6 hours as needed for pain    allopurinol (ZYLOPRIM) 100 mg, oral, Daily    B complex-vitamin C-folic acid (Nephro-Chi Rx) 1- mg-mg-mcg tablet 1 tablet, oral, Daily with breakfast    carvedilol (COREG) 25 mg, oral, 2 times daily (morning and late afternoon), Take with food.    cetirizine (ZYRTEC) 10 mg, oral, Every evening    cyanocobalamin (VITAMIN B-12) 2,000 mcg, oral, Every other day    fluconazole (Diflucan) 150 mg tablet TAKE ONCE WEEKLY FOR ONE MONTH    folic acid (FOLVITE) 1 mg, oral, Daily    furosemide (LASIX) 40 mg, oral, Daily    hydrALAZINE (APRESOLINE) 50 mg, oral, 2 times daily    ketoconazole (NIZOral) 2 % cream APPLY TO AFFECTED AREA EVERY  MORNING TILL CLEAR, REPEAT AS NEEDED.    levothyroxine (Synthroid, Levoxyl) 88 mcg tablet TAKE 1 TABLET BY MOUTH EVERY DAY ON EMPTY STOMACH    lisinopril 40 mg, oral, Daily    phenazopyridine (PYRIDIUM) 200 mg, oral, 3 times daily (morning, midday, late afternoon)    predniSONE (Deltasone) 10 mg tablet if needed.    tamsulosin (FLOMAX) 0.4 mg, oral, Daily    thiamine (VITAMIN B-1) 100 mg, oral, Daily    Xarelto 20 mg, oral, Daily with evening meal      Past Medical History:   Diagnosis Date    (HFpEF) heart failure with preserved ejection fraction (Multi)     Arthritis     Benign prostatic hyperplasia with urinary retention     self caths    Disease of thyroid gland     hypothyroidism    ETOH abuse     History of transfusion     Hyperlipidemia     Hypertension     Personal history of diseases of the blood and blood-forming organs and certain disorders involving the immune mechanism     History of hemorrhagic diathesis    Personal history of other diseases of the circulatory system     History of hypertension    Personal history of other diseases of the circulatory system 12/22/2022    History of aortic valve stenosis    Personal history of other diseases of the circulatory system 10/10/2021    History of chronic atrial fibrillation    Personal history of other diseases of the musculoskeletal system and connective tissue     History of spinal stenosis    Personal history of other diseases of the musculoskeletal system and connective tissue     History of arthritis    Personal history of other diseases of the nervous system and sense organs     History of sleep apnea with CPAP    SBO (small bowel obstruction) (Multi)     Thrombocytopenia concurrent with and due to alcoholism (Multi)     Umbilical hernia       Performance:   ECOG Performance Status: 2- Ambulatory 50% of waking  hours          Vitals and Measurements:       7/3/2024    10:30 AM 7/5/2024    12:06 PM 7/17/2024    11:07 AM 7/31/2024    10:59 AM 7/31/2024  "    1:12 PM 8/2/2024    10:43 AM 8/14/2024    10:25 AM   Vitals   Systolic 166 138 160 115  90 116   Diastolic 77 90 78 59  50 50   Heart Rate 70 63 53 46 55 47 50   Temp  36.6 °C (97.8 °F) 36 °C (96.8 °F) 35.8 °C (96.4 °F)   36 °C (96.8 °F)   Resp   17 17   16   Height (in)  1.88 m (6' 2\")    1.88 m (6' 2\")    Weight (lb)  354 338.85 326.83 326.28 326 334.44   BMI  45.45 kg/m2 43.51 kg/m2 41.96 kg/m2 41.89 kg/m2 41.86 kg/m2 42.94 kg/m2   BSA (m2)  2.9 m2 2.84 m2 2.78 m2 2.78 m2 2.78 m2 2.82 m2   Visit Report  Report  Report Report Report Report      Physical Exam:      Constitutional: Improved complexion, awake/alert/oriented  x3, no distress, alert and cooperative   Eyes: clear scleras   ENMT: mucous membranes moist, no apparent injury, no lesions seen   Head/Neck: Neck supple, no apparent injury, thyroid  without mass or tenderness, No JVD, trachea midline, no bruits   Respiratory/Thorax: Patent airways, CTAB, diminished bases bilateral, normal  breath sounds with good chest expansion, thorax symmetric   Cardiovascular: arrhythmia at baseline, no murmurs, swelling in bilateral legs plus one   Gastrointestinal: Obese, soft, non-tender, no rebound tenderness or guarding, unable to palpate liver or spleen   Musculoskeletal: ROM intact, no joint swelling, normal  strength   Extremities: Swelling right ankle, brace, no cyanosis, no contusions or wounds, no clubbing, mild swelling ankles and discoloration, venous insufficiency bilateral lower legs.    Neurological: alert and oriented x3, intact senses,  motor, response and reflexes, normal strength for baseline   Lymphatic: No significant cervical or subclavicular  lymphadenopathy   Psychological: Appropriate mood and behavior. Assessment of alcohol cessation, patient does not want to quit drinking at this time   Skin: Warm and dry, pink cheeks, no lesions, no rashes      Lab Results:    Lab Results   Component Value Date    WBC 4.5 08/14/2024    NEUTROABS 2.82 " 08/14/2024    IGABSOL 0.01 08/14/2024    LYMPHSABS 0.91 08/14/2024    MONOSABS 0.39 08/14/2024    EOSABS 0.32 08/14/2024    BASOSABS 0.02 08/14/2024    RBC 3.27 (L) 08/14/2024     (H) 08/14/2024    MCHC 31.0 (L) 08/14/2024    HGB 10.4 (L) 08/14/2024    HCT 33.6 (L) 08/14/2024     (L) 08/14/2024     Lab Results   Component Value Date    RETICCTPCT 3.3 (H) 02/29/2024      Lab Results   Component Value Date    CREATININE 1.50 (H) 07/17/2024    BUN 18 07/17/2024    EGFR 49 (L) 07/17/2024     07/17/2024    K 3.6 07/17/2024     07/17/2024    CO2 26 07/17/2024      Lab Results   Component Value Date    ALT 18 07/03/2024    AST 16 07/03/2024    ALKPHOS 101 07/03/2024    BILITOT 0.8 07/03/2024      Lab Results   Component Value Date    TSH 2.56 07/17/2024     Lab Results   Component Value Date    TSH 2.56 07/17/2024     Lab Results   Component Value Date    IRON 71 06/19/2024    TIBC 338 06/19/2024    FERRITIN 117 06/19/2024      Lab Results   Component Value Date    RAFBNZEG96 1,671 (H) 11/30/2023      Lab Results   Component Value Date    FOLATE >24.0 11/30/2023     Lab Results   Component Value Date    SEDRATE 6 08/24/2023      Lab Results   Component Value Date    CRP CANCELED 08/24/2023     Lab Results   Component Value Date     02/29/2024     Lab Results   Component Value Date    HAPTOGLOBIN 124 11/30/2023     Lab Results   Component Value Date    SPEP Increase in polyclonal gamma globulins.   02/29/2024     Lab Results   Component Value Date    IGG 1,500 02/29/2024     02/29/2024     02/29/2024        Lab Results   Component Value Date    WBC 5.8 07/31/2024    NEUTROABS 3.96 07/31/2024    IGABSOL 0.01 07/31/2024    LYMPHSABS 1.07 07/31/2024    MONOSABS 0.37 07/31/2024    EOSABS 0.31 07/31/2024    BASOSABS 0.03 07/31/2024    RBC 3.61 (L) 07/31/2024     (H) 07/31/2024    MCHC 31.3 (L) 07/31/2024    HGB 11.6 (L) 07/31/2024    HCT 37.1 (L) 07/31/2024     (L)  07/31/2024     Lab Results   Component Value Date    RETICCTPCT 3.3 (H) 02/29/2024      Lab Results   Component Value Date    CREATININE 1.50 (H) 07/17/2024    BUN 18 07/17/2024    EGFR 49 (L) 07/17/2024     07/17/2024    K 3.6 07/17/2024     07/17/2024    CO2 26 07/17/2024      Lab Results   Component Value Date    ALT 18 07/03/2024    AST 16 07/03/2024    ALKPHOS 101 07/03/2024    BILITOT 0.8 07/03/2024      Lab Results   Component Value Date    TSH 2.56 07/17/2024     Lab Results   Component Value Date    TSH 2.56 07/17/2024     Lab Results   Component Value Date    IRON 71 06/19/2024    TIBC 338 06/19/2024    FERRITIN 117 06/19/2024      Lab Results   Component Value Date    QBYLWMTF59 1,671 (H) 11/30/2023      Lab Results   Component Value Date    FOLATE >24.0 11/30/2023     Lab Results   Component Value Date    SEDRATE 6 08/24/2023      Lab Results   Component Value Date    CRP CANCELED 08/24/2023        Lab Results   Component Value Date     02/29/2024     Lab Results   Component Value Date    HAPTOGLOBIN 124 11/30/2023     Lab Results   Component Value Date    SPEP Increase in polyclonal gamma globulins.   02/29/2024     Lab Results   Component Value Date    IGG 1,500 02/29/2024     02/29/2024     02/29/2024     Lab Results   Component Value Date    URICACID 8.0 (H) 05/02/2024         Assessment and Plan:   Pleasant 74-year-old male presents for pancytopenia follow-up.  Discussed likely contributors below along with not being able to R/O MDS. Also discussed daily alcohol use and chronic urinary tract infections as playing a role in his pancytopenia.  Pathologist review of CBCd reveal macrocytic anemia. Supplements of vital vitamin nutrients advised and prescribed.  Discussed the nutritional deficiency, liver disease and alcohol intake as likely causation. His MCV is 119, more deficient from previous value of 107-119 over the last year. WBC 4.8, Hgb 9 and dropping while taking  supplements. RBC 2.49 and WBC 4.0, platelets are 85. His daily alcohol consumption is of concern for pancytopenia. He states understanding.  Concern for bleeding, he denies any signs of bleeding.  He is in agreement for bone marrow biopsy.  Iron panel stable.  Ferritin normal range.  Likely anemia of chronic disease versus bleeding, as additional differential.     Bone Marrow Biopsy  FINAL DIAGNOSIS 6/6/2024   A-C. BONE MARROW CLOT, CORE BIOPSY, AND ASPIRATE SMEAR, RIGHT ILIAC CREST:   -- SLIGHTLY HYPERCELLULAR BONE MARROW FOR AGE (50-60%) WITH MATURING TRILINEAGE HEMATOPOIESIS.   There is no definite morphologic, immunophenotypic and molecular evidence of myeloid neoplasm in the given sample. The possible etiologies for persistent cytopenias includes but not limited to infectious, autoimmune, medication induced or toxin related causes.     Patient's hemoglobin is in the 9-10 range.  Tolerating Epogen injections.  Patient states blood pressure is managed at home and he has whitecoat syndrome while in the office.  Recent changes to his blood pressure medication.    Additional Observations, history, and referrals:  He is wheel chair bound other than pivot and transfer independently, increased weakness. His physical activity is limited by physical pain back and joints. Obesity noted. He has followed inpatient cardiology 09/2021 and 11/2022 for severe aortic stenosis and a-flutter. Follow cardiology yearly for ECHO and assessment. He is on Xarelto and ASA. Followed GI 08/31/2017 for colonoscopy for LLQ pain. Redundant colon noted. No specimens taken. Repeat five years recommended.   Patient could have IBS secondary to alcohol abuse.  Patient originally followed with GI/hepatology.  Who did not feel liver biopsy was necessary as obvious evidence of disease not noted.  He will avoid a FibroScan because he thinks it will have a false positive given alcohol use.  Her Dr. Alcides Ohara's note dated 11/30/2023.    8/14/2024  patient improved since Procrit injections.  He will receive today as his hemoglobin is 10.4.  Platelets are stable.  No bleeding or bruising.    I have reviewed the patient's MHR of all notes, labs, imaging, procedures to assist in evaluation of health. Some of his health records were obtained outside resources [facility].    Follow-up:  RTC:  -3 months  -Biweekly Procrit ordered at 20, 000 units with standard protocol and administration guidelines per treatment plan    Medications:  -NA    Imaging:  -NA     Referral/Additional appointments:   -PCP as scheduled/needed   -Procedural hernia repair Dr. Araujo 8/27/2024  -Cardiology 9/3/2024  -General Surgery follow-up 9/11/2024  -Nephrology 10/15/2024  -Cardiology 2/7/2025    Discussed the results of laboratory work-up in detail.  Patient states understanding and agreeable to plan of care.  Patient will call with any questions or concerns.        Thank you allowing me to care for you today.    Sincerely,  Vickie Srivastava, APRN-CNP     This note has been written with voice recognition software if there is need for clarity please reach out.

## 2024-08-19 ENCOUNTER — PRE-ADMISSION TESTING (OUTPATIENT)
Dept: PREADMISSION TESTING | Facility: HOSPITAL | Age: 74
End: 2024-08-19
Payer: MEDICARE

## 2024-08-19 VITALS
DIASTOLIC BLOOD PRESSURE: 54 MMHG | TEMPERATURE: 96.8 F | HEIGHT: 74 IN | RESPIRATION RATE: 20 BRPM | BODY MASS INDEX: 40.43 KG/M2 | OXYGEN SATURATION: 95 % | SYSTOLIC BLOOD PRESSURE: 111 MMHG | WEIGHT: 315 LBS | HEART RATE: 61 BPM

## 2024-08-19 DIAGNOSIS — K42.9 UMBILICAL HERNIA WITHOUT OBSTRUCTION OR GANGRENE: ICD-10-CM

## 2024-08-19 DIAGNOSIS — Z01.818 PREOPERATIVE EXAMINATION: Primary | ICD-10-CM

## 2024-08-19 PROCEDURE — 99204 OFFICE O/P NEW MOD 45 MIN: CPT | Performed by: NURSE PRACTITIONER

## 2024-08-19 PROCEDURE — 87081 CULTURE SCREEN ONLY: CPT | Mod: GEALAB | Performed by: NURSE PRACTITIONER

## 2024-08-19 RX ORDER — CHLORHEXIDINE GLUCONATE ORAL RINSE 1.2 MG/ML
15 SOLUTION DENTAL DAILY
Qty: 30 ML | Refills: 0 | Status: SHIPPED | OUTPATIENT
Start: 2024-08-19 | End: 2024-08-21

## 2024-08-19 RX ORDER — CHLORHEXIDINE GLUCONATE 40 MG/ML
1 SOLUTION TOPICAL DAILY
Start: 2024-08-19 | End: 2024-08-24

## 2024-08-19 ASSESSMENT — DUKE ACTIVITY SCORE INDEX (DASI)
CAN YOU PARTICIPATE IN STRENOUS SPORTS LIKE SWIMMING, SINGLES TENNIS, FOOTBALL, BASKETBALL, OR SKIING: NO
CAN YOU HAVE SEXUAL RELATIONS: NO
CAN YOU TAKE CARE OF YOURSELF (EAT, DRESS, BATHE, OR USE TOILET): YES
TOTAL_SCORE: 5.45
CAN YOU CLIMB A FLIGHT OF STAIRS OR WALK UP A HILL: NO
CAN YOU PARTICIPATE IN MODERATE RECREATIONAL ACTIVITIES LIKE GOLF, BOWLING, DANCING, DOUBLES TENNIS OR THROWING A BASEBALL OR FOOTBALL: NO
CAN YOU RUN A SHORT DISTANCE: NO
CAN YOU WALK INDOORS, SUCH AS AROUND YOUR HOUSE: NO
CAN YOU DO YARD WORK LIKE RAKING LEAVES, WEEDING OR PUSHING A MOWER: NO
CAN YOU DO LIGHT WORK AROUND THE HOUSE LIKE DUSTING OR WASHING DISHES: YES
CAN YOU DO HEAVY WORK AROUND THE HOUSE LIKE SCRUBBING FLOORS OR LIFTING AND MOVING HEAVY FURNITURE: NO
DASI METS SCORE: 3.4
CAN YOU WALK A BLOCK OR TWO ON LEVEL GROUND: NO
CAN YOU DO MODERATE WORK AROUND THE HOUSE LIKE VACUUMING, SWEEPING FLOORS OR CARRYING GROCERIES: NO

## 2024-08-19 ASSESSMENT — PAIN SCALES - GENERAL: PAINLEVEL_OUTOF10: 3

## 2024-08-19 ASSESSMENT — ACTIVITIES OF DAILY LIVING (ADL): ADL_SCORE: 1

## 2024-08-19 ASSESSMENT — ENCOUNTER SYMPTOMS
GASTROINTESTINAL NEGATIVE: 1
EYES NEGATIVE: 1
NECK NEGATIVE: 1
NUMBNESS: 1
LIMITED RANGE OF MOTION: 1
RESPIRATORY NEGATIVE: 1
CARDIOVASCULAR NEGATIVE: 1
CONSTITUTIONAL NEGATIVE: 1

## 2024-08-19 ASSESSMENT — LIFESTYLE VARIABLES: SMOKING_STATUS: NONSMOKER

## 2024-08-19 ASSESSMENT — PAIN - FUNCTIONAL ASSESSMENT: PAIN_FUNCTIONAL_ASSESSMENT: 0-10

## 2024-08-19 NOTE — CPM/PAT H&P
CPM/PAT Evaluation       Name: Aly Mccarty (Aly Mccarty)  /Age: 1950/74 y.o.     Visit Type:   In-Person       Chief Complaint: Umbilical hernia without obstruction    HPI 75 y/o male scheduled for umbilical hernia repair with mesh on 2024 with  Dr. Araujo secondary to Umbilical hernia without obstruction.  PMHX includes Afib, HFpEF, HTN, CAD, Aortic Stenosis s/p TAVR, pancytopenia ().  PAT is consulted today for perioperative risk stratification and optimization.      Past Medical History:   Diagnosis Date    (HFpEF) heart failure with preserved ejection fraction (Multi)     Anemia     Arthritis     Benign prostatic hyperplasia with urinary retention     self caths 3-4 times daily    Chronic UTI     CKD (chronic kidney disease)     Coronary artery disease     Disease of thyroid gland     hypothyroidism    ETOH abuse     Fatty liver     GERD (gastroesophageal reflux disease)     GI (gastrointestinal bleed)     Heart valve disease     calcified aortic valve    History of transfusion      for GI bleed   no reaction    HL (hearing loss)     Hyperlipidemia     Hypertension     Idiopathic neuropathy     Osteomyelitis (Multi)     Pancytopenia (Multi)     Personal history of diseases of the blood and blood-forming organs and certain disorders involving the immune mechanism     History of hemorrhagic diathesis    Personal history of other diseases of the circulatory system     History of hypertension    Personal history of other diseases of the circulatory system 2022    History of aortic valve stenosis    Personal history of other diseases of the circulatory system 10/10/2021    History of chronic atrial fibrillation    Personal history of other diseases of the musculoskeletal system and connective tissue     History of spinal stenosis    Personal history of other diseases of the musculoskeletal system and connective tissue     History of arthritis    Personal history of other diseases  of the nervous system and sense organs     History of sleep apnea with CPAP    Pulmonary HTN (Multi)     SBO (small bowel obstruction) (Multi)     Septicemia (Multi)     Sleep apnea     Thrombocytopenia concurrent with and due to alcoholism (Multi)     Umbilical hernia     Uses wheelchair     Vitamin B12 deficiency     Vitamin D deficiency     Wears hearing aid in both ears        Past Surgical History:   Procedure Laterality Date    ABDOMINAL SURGERY      AORTIC VALVE REPLACEMENT      TAVR    CARDIAC SURGERY      COLONOSCOPY      EYE SURGERY      FRACTURE SURGERY      OTHER SURGICAL HISTORY  08/08/2019    PICC line insertion    OTHER SURGICAL HISTORY  08/08/2019    Leg surgery    OTHER SURGICAL HISTORY  07/07/2021    Tonsillectomy with adenoidectomy    TONSILLECTOMY      UPPER GASTROINTESTINAL ENDOSCOPY      VASCULAR SURGERY         Patient Sexual activity questions deferred to the physician.    Family History   Problem Relation Name Age of Onset    Hypertension Mother      Breast cancer Mother      Kidney disease Father          CKD    Peripheral vascular disease Father      Cancer Sibling         Allergies   Allergen Reactions    Levofloxacin Angioedema and Hives       Prior to Admission medications    Medication Sig Start Date End Date Taking? Authorizing Provider   acetaminophen (Tylenol) 500 mg tablet Take 1-2 by mouth every 6 hours as needed for pain 7/3/24   Dewayne Shepherd,    allopurinol (Zyloprim) 100 mg tablet Take 1 tablet (100 mg) by mouth once daily. 5/4/24 5/4/25  Milli Mott MD   B complex-vitamin C-folic acid (Nephro-Chi Rx) 1- mg-mg-mcg tablet Take 1 tablet by mouth once daily with a meal. 12/3/23 12/2/24  Vickie Srivastava APRN-CNP   carvedilol (Coreg) 25 mg tablet Take 1 tablet (25 mg) by mouth 2 times daily (morning and late afternoon). Take with food. 8/2/24 8/2/25  Tiffani Frye APRN-CNP   cetirizine (ZyrTEC) 10 mg tablet Take 1 tablet (10 mg) by mouth once daily in  the evening.    Historical Provider, MD   cyanocobalamin (Vitamin B-12) 1,000 mcg tablet Take 2 tablets (2,000 mcg) by mouth every other day.    Historical Provider, MD   fluconazole (Diflucan) 150 mg tablet TAKE ONCE WEEKLY FOR ONE MONTH 7/23/24   Historical Provider, MD   folic acid (Folvite) 1 mg tablet TAKE 1 TABLET BY MOUTH EVERY DAY 6/28/24   RICARDA Fatima-CNP   furosemide (Lasix) 40 mg tablet Take 1 tablet (40 mg) by mouth once daily. 8/2/24 8/2/25  RICARDA Castro-CNP   hydrALAZINE (Apresoline) 50 mg tablet Take 1 tablet (50 mg) by mouth 2 times a day. 8/2/24 8/2/25  RICARDA Castro-CNP   ketoconazole (NIZOral) 2 % cream APPLY TO AFFECTED AREA EVERY MORNING TILL CLEAR, REPEAT AS NEEDED. 7/23/24   Historical Provider, MD   levothyroxine (Synthroid, Levoxyl) 88 mcg tablet TAKE 1 TABLET BY MOUTH EVERY DAY ON EMPTY STOMACH 7/21/24   Luis Eduardo Kay DO   lisinopril 40 mg tablet Take 1 tablet (40 mg) by mouth once daily. 8/2/24 8/2/25  DINORA Castro   phenazopyridine (Pyridium) 200 mg tablet Take 1 tablet (200 mg) by mouth 3 times daily (morning, midday, late afternoon). 12/9/22   Historical Provider, MD   predniSONE (Deltasone) 10 mg tablet if needed. 6/26/24   Historical Provider, MD   tamsulosin (Flomax) 0.4 mg 24 hr capsule Take 1 capsule (0.4 mg) by mouth once daily. 1/2/24 1/1/25  Alfredito Lozada MD MPH   thiamine 100 mg tablet Take 1 tablet (100 mg) by mouth once daily. 9/7/23   Historical Provider, MD   Xarelto 20 mg tablet Take 1 tablet (20 mg) by mouth once daily in the evening. Take with meals. 8/2/24 8/2/25  DINORA Castro        PAT ROS:   Constitutional:   neg    Neuro/Psych:    Right foot numbness   numbness  Eyes:   neg    Ears:   neg    Nose:   Mouth:   Throat:   neg    Neck:   neg    Cardio:   neg    Respiratory:   neg    Endocrine:   GI:   neg    :   neg    Musculoskeletal:    decreased ROM  Hematologic:    history of blood  transfusion  Skin:      Physical Exam  Vitals reviewed.   Constitutional:       Appearance: Normal appearance.   HENT:      Mouth/Throat:      Mouth: Mucous membranes are moist.      Pharynx: Oropharynx is clear.   Eyes:      Pupils: Pupils are equal, round, and reactive to light.   Cardiovascular:      Rate and Rhythm: Normal rate and regular rhythm.      Pulses: Normal pulses.      Heart sounds: Normal heart sounds.   Pulmonary:      Effort: Pulmonary effort is normal.      Breath sounds: Normal breath sounds.   Abdominal:      General: Bowel sounds are normal.      Palpations: Abdomen is soft.   Musculoskeletal:      Cervical back: Normal range of motion and neck supple.   Skin:     General: Skin is warm and dry.   Neurological:      General: No focal deficit present.      Mental Status: He is alert and oriented to person, place, and time.   Psychiatric:         Mood and Affect: Mood normal.         Behavior: Behavior normal.          PAT AIRWAY:   Airway:     Mallampati::  IV    Neck ROM::  Full      Visit Vitals  /54   Pulse 61   Temp 36 °C (96.8 °F)   Resp 20       DASI Risk Score      Flowsheet Row Most Recent Value   DASI SCORE 5.45   METS Score (Will be calculated only when all the questions are answered) 3.4          Caprini DVT Assessment      Flowsheet Row Most Recent Value   DVT Score 8   Current Status Major surgery planned, including arthroscopic and laproscopic (1-2 hours)   History Congestive heart failure   Age 60-75 years   BMI 41-50 (Morbid obesity)          Modified Frailty Index    No data to display       CHADS2 Stroke Risk  Current as of 8 minutes ago        5.9% 3 to 100%: High Risk   2 to < 3%: Medium Risk   0 to < 2%: Low Risk     No Change          This score determines the patient's risk of having a stroke if the patient has atrial fibrillation.          Points Metrics   1 Has Congestive Heart Failure:  Yes     Patients with congestive heart failure get 1 point.    Current as of 8  minutes ago   1 Has Hypertension:  Yes     Patients with hypertension get 1 point.    Current as of 8 minutes ago   0 Age:  74     Patients who are 75 years of age or older get 1 point.    Current as of 8 minutes ago   1 Has Diabetes:  Yes     Patients with diabetes get 1 point.    Current as of 8 minutes ago   0 Had Stroke:  No  Had TIA:  No  Had Thromboembolism:  No     Patients who have had a stroke, TIA, or thromboembolism get 2 points.    Current as of 8 minutes ago             Revised Cardiac Risk Index      Flowsheet Row Most Recent Value   Revised Cardiac Risk Calculator 0          Apfel Simplified Score      Flowsheet Row Most Recent Value   Apfel Simplified Score Calculator 2          Risk Analysis Index Results This Encounter         8/19/2024  1036             JIMENEZ Cancer History: Patient does not indicate history of cancer    Total Risk Analysis Index Score Without Cancer: 39    Total Risk Analysis Index Score: 39          Stop Bang Score      Flowsheet Row Most Recent Value   Do you snore loudly? 1   Do you often feel tired or fatigued after your sleep? 1   Has anyone ever observed you stop breathing in your sleep? 1   Do you have or are you being treated for high blood pressure? 1   Recent BMI (Calculated) 41.8   Is BMI greater than 35 kg/m2? 1=Yes   Age older than 50 years old? 1=Yes   Is your neck circumference greater than 17 inches (Male) or 16 inches (Female)? 1   Gender - Male 1=Yes   STOP-BANG Total Score 8            Assessment and Plan:     HPI 75 y/o male scheduled for umbilical hernia repair with mesh on 8/27/2024 with  Dr. Araujo secondary to Umbilical hernia without obstruction.  PMHX includes Afib, HFpEF, HTN, CAD, Aortic Stenosis s/p TAVR, pancytopenia (2014).  PAT is consulted today for perioperative risk stratification and optimization.    Neuro:  No neurologic diagnosis, however, the patient is at increased risk for perioperative delirium secondary to  age, polypharmacy  Patient is at  increased risk for perioperative CVA secondary to HTN, increased age    HEENT:  No HEENT diagnosis or significant findings on chart review or clinical presentation and evaluation. No further preoperative testing/intervention indicated at this time.    Cardiovascular:  Follows with Cardiology, RICARDA Navarro.  Last seen 8/14/2024  [facility] 10/2022:  Non obstructive CAD   TAVR  implant 11/2022   ECHO 8/2024;  55-60% EF, SHERI severely dilated, mild MVS; TAVR  Continue carvedilol, hydralazine and Lasix  Holding lisinopril day of surgery  Holding Xarelto 3 days prior to procedure  METS: 3.4  RCRI: 0 points, 3.9%  risk for postoperative MACE   MELQUIADES: 0.2% risk for 30 day postoperative MACE  EKG - as above     Pulmonary:  No pulmonary diagnosis, however patient is at increased risk of perioperative complications secondary to  age > 60, obesity, functional dependence  Complaint with CPAP   Stop Bang score is 8 placing patient at high risk for AMANDA  ARISCAT: <26 points, 1.6% risk of in-hospital postoperative pulmonary complication  PRODIGY: High risk for opioid induced respiratory depression  Pumonary toilet education discussed, patient also provided deep breathing exercises and incentive spirometry educational handout    Renal:   No renal diagnosis, however patient is at increase risk for perioperative renal complications secondary to  Age equal to or greater than 56, BMI equal to or greater than 30, HTN, use of an ace, arb, or NSAID      Endocrine:  No endocrine diagnosis or significant findings on chart review or clinical presentation and evaluation. No further testing or intervention is indicated at this time.    Hematologic:  Follows with Hematology & Oncology, Vickie Srivastava.  Last seen 8/14/2024 for Anemia of CKD  Pancytopenia ongoing since 2014 likley 2/2 ETOH intake; Has quit drinking for >4 wkks  Anemia - improvement with Procrit  H&H 8/14/2024:  10.4/33.6  Platelets stable at 101  Caprini Score 8, patient at High  risk for perioperative DVT.  Patient provided with VTE education/handout.    Gastrointestinal:   No GI diagnosis or significant findings on chart review or clinical presentation and evaluation.   Apfel 2    Urology  Follows with Dr. Lozada for urinary retention and BPH  Self caths 3x/day  Still produces urine    Infectious disease:   No infectious diagnosis or significant findings on chart review or clinical presentation and evaluation.   Prescription provided for CHG body wash and dental rinse. CHG use instructions reviewed and provided to patient.  Staph screen collected    Musculoskeletal:   Pt does not ambulate 2/2 spinal stenosis and feet pain.  In wheelchair most of the day    Anesthesia/Airway:  No anesthesia complications      Medication instructions and NPO guidelines reviewed with the patient.  All questions or concerns discussed and addressed.  Advised pt to not smoke marijuana 2 days prior to procedure    Labs and EKG ordered

## 2024-08-19 NOTE — PREPROCEDURE INSTRUCTIONS
Thank you for visiting [facility] ([facility]) today for your pre-procedure evaluation, you were seen by     Kayley Khan CNP  Pre Admission Testing  [facility]  [phone]    This summary includes instructions and information to aid you during your perioperative period.  Please read carefully. If you have any questions about your visit today, please call the number listed above.  If you become ill or have any changes to your health before your surgery, please contact your primary care provider and alert your surgeon.    Preparing for your Surgery       Exercises  Preoperative Deep Breathing Exercises  Why it is important to do deep breathing exercises before my surgery?  Deep breathing exercises strengthen your breathing muscles.  This helps you to recover after your surgery and decreases the chance of breathing complications.  How are the deep breathing exercises done?  Sit straight with your back supported.  Breathe in deeply and slowly through your nose. Your lower rib cage should expand and your abdomen may move forward.  Hold that breath for 3 to 5 seconds.  Breathe out through pursed lips, slowly and completely.  Rest and repeat 10 times every hour while awake.  Rest longer if you become dizzy or lightheaded.       Preoperative Brain Exercises    What are brain exercises?  A brain exercise is any activity that engages your thinking (cognitive) skills.    What types of activities are considered brain exercises?  Jigsaw puzzles, crossword puzzles, word jumble, memory games, word search, and many more.  Many can be found free online or on your phone via a mobile lucius.    Why should I do brain exercises before my surgery?  More recent research has shown brain exercise before surgery can lower the risk of postoperative delirium (confusion) which can be especially important for older adults.  Patients who did brain exercises for 5 to 10 hours the days before surgery, cut their risk  of postoperative delirium in half up to 1 week after surgery.    Sit-to-Stand Exercise    What is the sit-to-stand exercise?  The sit-to-stand exercise strengthens the muscles of your lower body and muscles in the center of your body (core muscles for stability) helping to maintain and improve your strength and mobility.  How do I do the sit-to-stand exercise?  The goal is to do this exercise without using your arms or hands.  If this is too difficult, use your arms and hands or a chair with armrests to help slowly push yourself to the standing position and lower yourself back to the sitting position. As the movement becomes easier use your arms and hands less.    Steps to the sit-to-stand exercise  Sit up tall in a sturdy chair, knees bent, feet flat on the floor shoulder-width apart.  Shift your hips/pelvis forward in the chair to correctly position yourself for the next movement.  Lean forward at your hips.  Stand up straight putting equal weight on both feet.  Check to be sure you are properly aligned with the chair, in a slow controlled movement sit back down.  Repeat this exercise 10-15 times.  If needed you can do it fewer times until your strength improves.  Rest for 1 minute.  Do another 10-15 sit-to-stand exercises.  Try to do this in the morning and evening.        Instructions    Preoperative Fasting Guidelines    Why must I stop eating and drinking near surgery time?  With sedation, food or liquid in your stomach can enter your lungs causing serious complications  Food can increase nausea and vomiting  When do I need to stop eating and drinking before my surgery?      Do not eat any food or drink any liquids after midnight the night before your surgery/procedure.  You may have sips of water to take medications.      Simple things you can do to help prevent blood clots     Blood clots are blockages that can form in the body's veins. When a blood clot forms in your deep veins, it may be called a deep vein  thrombosis, or DVT for short. Blood clots can happen in any part of the body where blood flows, but they are most common in the arms and legs. If a piece of a blood clot breaks free and travels to the lungs, it is called a pulmonary embolus (PE). A PE can be a very serious problem.         Being in the hospital or having surgery can raise your chances of getting a blood clot because you may not be well enough to move around as much as you normally do.         Ways you can help prevent blood clots in the hospital       Wearing SCDs  SCDs stands for Sequential Compression Devices.   SCDs are special sleeves that wrap around your legs. They attach to a pump that fills them with air to gently squeeze your legs every few minutes.  This helps return the blood in your legs to your heart.   SCDs should only be taken off when walking or bathing. SCDs may not be comfortable, but they can help save your life.              Pump SCD leg sleeves  Wearing compression stockings - if your doctor orders them. These special snug-fitting stockings gently squeeze your legs to help blood flow.       Walking. Walking helps move the blood in your legs.   If your doctor says it is ok, try walking the halls at least   5 times a day. Ask us to help you get up, so you don't fall.      Taking any blood-thinning medicines your doctor orders.              Ways you can help prevent blood clots at home         Wearing compression stockings - if your doctor orders them.   Walking - to help move the blood in your legs.    Taking any blood-thinning medicines your doctor orders.      Signs of a blood clot or PE    Tell your doctor or nurse right away if you have any of the problems listed below.         If you are at home, seek medical care right away. Call 911 for chest pain or problems breathing.            Signs of a blood clot (DVT) - such as pain, swelling, redness, or warmth in your arm or legs.  Signs of a pulmonary embolism (PE) - such as chest  "pain or feeling short of breath      Tobacco and Alcohol;  Do not drink alcohol or smoke within 24 hours of surgery.  It is best to quit smoking for as long as possible before any surgery or procedure.    Other Instructions  Why did I have my nose, under my arms, and groin swabbed? The purpose of the swab is to identify Staphylococcus aureus inside your nose or on your skin.  The swab was sent to the laboratory for culture.  A positive swab/culture for Staphylococcus aureus is called colonization or carriage.     What is Staphylococcus aureus? Staphylococcus aureus, also known as \"staph\", is a germ found on the skin or in the nose of healthy people.  Sometimes Staphylococcus aureus can get into the body and cause an infection.  This can be minor (such as pimples, boils, or other skin problems).  It might also be serious (such as a blood infection, pneumonia, or a surgical site infection).     What is Staphylococcus aureus colonization or carriage? Colonization or carriage means that a person has the germ but is not sick from it.  These bacteria can be spread on the hands or when breathing or sneezing.   How is Staphylococcus aureus spread? It is most often spread by close contact with a person or item that carries it.   What happens if my culture is positive for Staphylococcus aureus? Your doctor/medical team will use this information to guide any antibiotic treatment which may be necessary.  Regardless of the culture results, we will clean the inside of your nose with a betadine swab just before you have your surgery.   Will I get an infection if I have Staphylococcus aureus in my nose or on my skin? Anyone can get an infection with Staphylococcus aureus.  However, the best way to reduce your risk of infection is to follow the instructions provided to you for the use of your CHG soap and dental rinse.      Body Wash:     What is a home preoperative antibacterial shower? This shower is a way of cleaning the skin " with a germ-killing solution before surgery.  The solution contains chlorhexidine, commonly known as CHG.  CHG is a skin cleanser with germ-killing ability.  Let your doctor know if you are allergic to chlorhexidine.   Why do I need to take a preoperative antibacterial shower? Skin is not sterile.  It is best to try to make your skin as free of germs as possible before surgery.  Proper cleansing with a germ-killing soap before surgery can lower the number of germs on your skin.  This helps to reduce the risk of infection at the surgical site.    Following the instructions listed below will help you prepare your skin for surgery.   How do I use the solution? Steps:  Begin using your CHG soap 5 days before your scheduled surgery on ____8/27/2024_______.     Keep CHG soap away from ear canals and eyes.  Rinse completely, do not condition.  Hair extensions should be removed. ,      Oral/Dental Rinse:     What is oral/dental rinse?  It is a mouthwash. It is a way of cleaning the mouth with a germ-killing solution before your surgery.  The solution contains chlorhexidine, commonly known as CHG. It is used inside the mouth to kill a bacteria known as Staphylococcus aureus.  Let your doctor know if you are allergic to Chlorhexidine.   Why do I need to use CHG oral/dental rinse? The CHG oral/dental rinse helps to kill a bacteria in your mouth known as Staphylococcus aureus.  This reduces the risk of infection at the surgical site.    Using your CHG oral/dental rinse STEPS: Use your CHG oral/dental rinse after you brush your teeth the night before (at bedtime) and the morning of your surgery.  Follow all directions on your prescription label.    Use the cap on the container to measure 15 ml.  Swish (gargle if you can) the mouthwash in your mouth for at least 30 seconds, (do not swallow) and spit out.  After you use your CHG rinse, do not rinse your mouth with water, drink or eat.    Please refer to the prescription label for  the appropriate time to resume oral intake   What side effects might I have using the CHG oral/dental rinse? CHG rinse will stick to plaque on the teeth.  Brush and floss just before use.  Teeth brushing will help avoid staining of plaque during use.          The Week before Surgery        Seven days before Surgery  Check your PAT medication instructions  Do the exercises provided to you by PAT  Arrange for a responsible, adult licensed  to take you home after surgery and stay with you for 24 hours.  You will not be permitted to drive yourself home if you have received any anesthetic/sedation  Six days before surgery  Check your PAT medication instructions  Do the exercises provided to you by PAT  Start using Chlorhexidene (CHG) body wash if prescribed  Five days before surgery  Check your PAT medication instructions  Do the exercises provided to you by PAT  Continue to use CHG body wash if prescribed  Three days before surgery  Check your PAT medication instructions  Do the exercises provided to you by PAT  Continue to use CHG body wash if prescribed  Two days before surgery  Check your PAT medication instructions  Do the exercises provided to you by PAT  Continue to use CHG body wash if prescribed    The Day before Surgery       Check your PAT medication and all other PAT instructions including when to stop eating and drinking  You will be called with your arrival time for surgery in the late afternoon.  If you do not receive a call please reach out to your surgeon's office.  Do not smoke or drink 24 hours before surgery  Prepare items to bring with you to the hospital  Shower with your chlorhexidine wash if prescribed  Brush your teeth and use your chlorhexidine dental rinse if prescribed    The Day of Surgery       Check your PAT medication instructions  Ensure you follow the instructions for when to stop eating and drinking  Shower, if prescribed use CHG.  Do not apply any lotions, creams, moisturizers,  perfume or deodorant  Brush your teeth and use your CHG dental rinse if prescribed  Wear loose comfortable clothing  Avoid make-up  Remove  jewelry and piercings, consider professional piercing removal with a plastic spacer if needed  Bring photo ID and Insurance card  Bring an accurate medication list that includes medication dose, frequency and allergies  Bring a copy of your advanced directives (will, health care power of )  Bring any devices and controllers as well as medical devices you have been provided with for surgery (CPAP, slings, braces, etc.)  Dentures, eyeglasses, and contacts will be removed before surgery, please bring cases for contacts or glasses

## 2024-08-21 ENCOUNTER — HOSPITAL ENCOUNTER (OUTPATIENT)
Dept: CARDIOLOGY | Facility: CLINIC | Age: 74
Discharge: HOME | End: 2024-08-21
Payer: MEDICARE

## 2024-08-21 DIAGNOSIS — I50.32 CHRONIC DIASTOLIC CONGESTIVE HEART FAILURE (MULTI): ICD-10-CM

## 2024-08-21 LAB — STAPHYLOCOCCUS SPEC CULT: NORMAL

## 2024-08-21 PROCEDURE — 93306 TTE W/DOPPLER COMPLETE: CPT | Performed by: INTERNAL MEDICINE

## 2024-08-21 PROCEDURE — 93306 TTE W/DOPPLER COMPLETE: CPT

## 2024-08-22 ENCOUNTER — PATIENT OUTREACH (OUTPATIENT)
Dept: CARE COORDINATION | Facility: CLINIC | Age: 74
End: 2024-08-22
Payer: MEDICARE

## 2024-08-23 LAB
AORTIC VALVE MEAN GRADIENT: 17 MMHG
AORTIC VALVE PEAK VELOCITY: 2.68 M/S
AV PEAK GRADIENT: 28.7 MMHG
AVA (PEAK VEL): 2.4 CM2
AVA (VTI): 2.46 CM2
EJECTION FRACTION APICAL 4 CHAMBER: 52.3
EJECTION FRACTION: 58 %
LEFT ATRIUM VOLUME AREA LENGTH INDEX BSA: 57.6 ML/M2
LEFT VENTRICLE INTERNAL DIMENSION DIASTOLE: 4.9 CM (ref 3.5–6)
LEFT VENTRICULAR OUTFLOW TRACT DIAMETER: 2.4 CM
RIGHT VENTRICLE FREE WALL PEAK S': 11.7 CM/S
RIGHT VENTRICLE PEAK SYSTOLIC PRESSURE: 47.2 MMHG
TRICUSPID ANNULAR PLANE SYSTOLIC EXCURSION: 2 CM

## 2024-08-26 ENCOUNTER — ANESTHESIA EVENT (OUTPATIENT)
Dept: OPERATING ROOM | Facility: HOSPITAL | Age: 74
End: 2024-08-26

## 2024-08-26 RX ORDER — SODIUM CHLORIDE, SODIUM LACTATE, POTASSIUM CHLORIDE, CALCIUM CHLORIDE 600; 310; 30; 20 MG/100ML; MG/100ML; MG/100ML; MG/100ML
100 INJECTION, SOLUTION INTRAVENOUS CONTINUOUS
OUTPATIENT
Start: 2024-08-26

## 2024-08-26 RX ORDER — LIDOCAINE HYDROCHLORIDE 10 MG/ML
0.1 INJECTION, SOLUTION EPIDURAL; INFILTRATION; INTRACAUDAL; PERINEURAL ONCE
OUTPATIENT
Start: 2024-08-26 | End: 2024-08-26

## 2024-08-26 RX ORDER — ACETAMINOPHEN 325 MG/1
650 TABLET ORAL EVERY 4 HOURS PRN
OUTPATIENT
Start: 2024-08-26

## 2024-08-26 RX ORDER — OXYCODONE HYDROCHLORIDE 5 MG/1
10 TABLET ORAL EVERY 4 HOURS PRN
OUTPATIENT
Start: 2024-08-26

## 2024-08-26 RX ORDER — OXYCODONE HYDROCHLORIDE 5 MG/1
5 TABLET ORAL EVERY 4 HOURS PRN
OUTPATIENT
Start: 2024-08-26

## 2024-08-27 ENCOUNTER — TELEPHONE (OUTPATIENT)
Dept: CARDIOLOGY | Facility: HOSPITAL | Age: 74
End: 2024-08-27
Payer: MEDICARE

## 2024-08-27 ENCOUNTER — ANESTHESIA (OUTPATIENT)
Dept: OPERATING ROOM | Facility: HOSPITAL | Age: 74
End: 2024-08-27
Payer: MEDICARE

## 2024-08-27 NOTE — TELEPHONE ENCOUNTER
----- Message from Tiffani Frye sent at 8/23/2024 10:27 AM EDT -----  Echo shows LV function is normal TAVR looks good   Thanks  ----- Message -----  From: Samanta Syngo - Cardiology Results In  Sent: 8/23/2024  12:39 AM EDT  To: Tiffani Frye, RANDICNP

## 2024-08-28 ENCOUNTER — APPOINTMENT (OUTPATIENT)
Dept: HEMATOLOGY/ONCOLOGY | Facility: CLINIC | Age: 74
End: 2024-08-28
Payer: MEDICARE

## 2024-09-03 ENCOUNTER — APPOINTMENT (OUTPATIENT)
Dept: CARDIOLOGY | Facility: HOSPITAL | Age: 74
End: 2024-09-03
Payer: MEDICARE

## 2024-09-09 ENCOUNTER — TELEPHONE (OUTPATIENT)
Dept: PRIMARY CARE | Facility: CLINIC | Age: 74
End: 2024-09-09
Payer: MEDICARE

## 2024-09-09 ENCOUNTER — PATIENT MESSAGE (OUTPATIENT)
Dept: HEMATOLOGY/ONCOLOGY | Facility: CLINIC | Age: 74
End: 2024-09-09
Payer: MEDICARE

## 2024-09-09 DIAGNOSIS — R50.9 FEVER, UNSPECIFIED FEVER CAUSE: Primary | ICD-10-CM

## 2024-09-09 DIAGNOSIS — R30.0 DYSURIA: Primary | ICD-10-CM

## 2024-09-09 DIAGNOSIS — R39.9 URINARY TRACT INFECTION SYMPTOMS: ICD-10-CM

## 2024-09-09 NOTE — TELEPHONE ENCOUNTER
Aly's wife called stating he has had a fever of 101 and states he has UTI symptoms. I believe she stated he is still catheterized. They were wondering if they need to drop off urine at the [place] lab or prescribed antibotic like previously?

## 2024-09-10 ENCOUNTER — LAB (OUTPATIENT)
Dept: LAB | Facility: LAB | Age: 74
End: 2024-09-10
Payer: MEDICARE

## 2024-09-10 DIAGNOSIS — R50.9 FEVER, UNSPECIFIED FEVER CAUSE: ICD-10-CM

## 2024-09-10 DIAGNOSIS — R30.0 DYSURIA: ICD-10-CM

## 2024-09-10 DIAGNOSIS — R39.9 URINARY TRACT INFECTION SYMPTOMS: ICD-10-CM

## 2024-09-10 LAB
APPEARANCE UR: ABNORMAL
BACTERIA #/AREA URNS AUTO: ABNORMAL /HPF
BILIRUB UR STRIP.AUTO-MCNC: NEGATIVE MG/DL
COLOR UR: ABNORMAL
GLUCOSE UR STRIP.AUTO-MCNC: NORMAL MG/DL
HOLD SPECIMEN: NORMAL
KETONES UR STRIP.AUTO-MCNC: NEGATIVE MG/DL
LEUKOCYTE ESTERASE UR QL STRIP.AUTO: ABNORMAL
MUCOUS THREADS #/AREA URNS AUTO: ABNORMAL /LPF
NITRITE UR QL STRIP.AUTO: NEGATIVE
PH UR STRIP.AUTO: 5.5 [PH]
PROT UR STRIP.AUTO-MCNC: ABNORMAL MG/DL
RBC # UR STRIP.AUTO: NEGATIVE /UL
RBC #/AREA URNS AUTO: ABNORMAL /HPF
SP GR UR STRIP.AUTO: 1.01
UROBILINOGEN UR STRIP.AUTO-MCNC: NORMAL MG/DL
WBC #/AREA URNS AUTO: >50 /HPF
WBC CLUMPS #/AREA URNS AUTO: ABNORMAL /HPF

## 2024-09-10 PROCEDURE — 87086 URINE CULTURE/COLONY COUNT: CPT

## 2024-09-10 PROCEDURE — 81001 URINALYSIS AUTO W/SCOPE: CPT

## 2024-09-10 RX ORDER — AMOXICILLIN AND CLAVULANATE POTASSIUM 500; 125 MG/1; MG/1
500 TABLET, FILM COATED ORAL 2 TIMES DAILY
Qty: 20 TABLET | Refills: 0 | Status: SHIPPED | OUTPATIENT
Start: 2024-09-10 | End: 2024-09-20

## 2024-09-11 ENCOUNTER — APPOINTMENT (OUTPATIENT)
Dept: SURGERY | Facility: CLINIC | Age: 74
End: 2024-09-11
Payer: MEDICARE

## 2024-09-11 ENCOUNTER — INFUSION (OUTPATIENT)
Dept: HEMATOLOGY/ONCOLOGY | Facility: CLINIC | Age: 74
End: 2024-09-11
Payer: MEDICARE

## 2024-09-11 VITALS
RESPIRATION RATE: 17 BRPM | TEMPERATURE: 97.2 F | SYSTOLIC BLOOD PRESSURE: 101 MMHG | OXYGEN SATURATION: 92 % | WEIGHT: 315 LBS | DIASTOLIC BLOOD PRESSURE: 63 MMHG | BODY MASS INDEX: 42.22 KG/M2 | HEART RATE: 56 BPM

## 2024-09-11 DIAGNOSIS — D63.1 ANEMIA DUE TO STAGE 4 CHRONIC KIDNEY DISEASE (MULTI): ICD-10-CM

## 2024-09-11 DIAGNOSIS — N18.4 ANEMIA DUE TO STAGE 4 CHRONIC KIDNEY DISEASE (MULTI): ICD-10-CM

## 2024-09-11 LAB
BASOPHILS # BLD AUTO: 0.02 X10*3/UL (ref 0–0.1)
BASOPHILS NFR BLD AUTO: 0.5 %
EOSINOPHIL # BLD AUTO: 0.18 X10*3/UL (ref 0–0.4)
EOSINOPHIL NFR BLD AUTO: 4.2 %
ERYTHROCYTE [DISTWIDTH] IN BLOOD BY AUTOMATED COUNT: 16.8 % (ref 11.5–14.5)
HCT VFR BLD AUTO: 29.1 % (ref 41–52)
HGB BLD-MCNC: 9.1 G/DL (ref 13.5–17.5)
IMM GRANULOCYTES # BLD AUTO: 0 X10*3/UL (ref 0–0.5)
IMM GRANULOCYTES NFR BLD AUTO: 0 % (ref 0–0.9)
LYMPHOCYTES # BLD AUTO: 0.93 X10*3/UL (ref 0.8–3)
LYMPHOCYTES NFR BLD AUTO: 21.6 %
MCH RBC QN AUTO: 32 PG (ref 26–34)
MCHC RBC AUTO-ENTMCNC: 31.3 G/DL (ref 32–36)
MCV RBC AUTO: 103 FL (ref 80–100)
MONOCYTES # BLD AUTO: 0.41 X10*3/UL (ref 0.05–0.8)
MONOCYTES NFR BLD AUTO: 9.5 %
NEUTROPHILS # BLD AUTO: 2.76 X10*3/UL (ref 1.6–5.5)
NEUTROPHILS NFR BLD AUTO: 64.2 %
NRBC BLD-RTO: ABNORMAL /100{WBCS}
PLATELET # BLD AUTO: 141 X10*3/UL (ref 150–450)
RBC # BLD AUTO: 2.84 X10*6/UL (ref 4.5–5.9)
WBC # BLD AUTO: 4.3 X10*3/UL (ref 4.4–11.3)

## 2024-09-11 PROCEDURE — 6350000001 HC RX 635 EPOETIN >10,000 UNITS: Mod: JG,EC

## 2024-09-11 PROCEDURE — 36415 COLL VENOUS BLD VENIPUNCTURE: CPT

## 2024-09-11 PROCEDURE — 96372 THER/PROPH/DIAG INJ SC/IM: CPT

## 2024-09-11 RX ORDER — ALBUTEROL SULFATE 0.83 MG/ML
3 SOLUTION RESPIRATORY (INHALATION) AS NEEDED
OUTPATIENT
Start: 2024-09-25

## 2024-09-11 RX ORDER — FAMOTIDINE 10 MG/ML
20 INJECTION INTRAVENOUS ONCE AS NEEDED
OUTPATIENT
Start: 2024-09-25

## 2024-09-11 RX ORDER — EPINEPHRINE 0.3 MG/.3ML
0.3 INJECTION SUBCUTANEOUS EVERY 5 MIN PRN
OUTPATIENT
Start: 2024-09-25

## 2024-09-11 RX ORDER — DIPHENHYDRAMINE HYDROCHLORIDE 50 MG/ML
50 INJECTION INTRAMUSCULAR; INTRAVENOUS AS NEEDED
OUTPATIENT
Start: 2024-09-25

## 2024-09-11 ASSESSMENT — PAIN SCALES - GENERAL: PAINLEVEL: 3

## 2024-09-11 NOTE — PROGRESS NOTES
Aly Mccarty assisted via WC to [facility] for epo injection .  Pt tolerated treatment without incident.  Aly Mccarty denies further questions/concerns/comments and agrees to POC going forward.

## 2024-09-12 LAB — BACTERIA UR CULT: ABNORMAL

## 2024-09-22 DIAGNOSIS — D61.818 PANCYTOPENIA: ICD-10-CM

## 2024-09-22 DIAGNOSIS — N28.9 KIDNEY DISEASE: ICD-10-CM

## 2024-09-24 RX ORDER — VIT B COMP NO.3/FOLIC/C/BIOTIN 1 MG-60 MG
1 TABLET ORAL DAILY
Qty: 90 TABLET | Refills: 3 | Status: SHIPPED | OUTPATIENT
Start: 2024-09-24

## 2024-09-25 ENCOUNTER — INFUSION (OUTPATIENT)
Dept: HEMATOLOGY/ONCOLOGY | Facility: CLINIC | Age: 74
End: 2024-09-25
Payer: MEDICARE

## 2024-09-25 VITALS
OXYGEN SATURATION: 94 % | BODY MASS INDEX: 42.34 KG/M2 | RESPIRATION RATE: 16 BRPM | SYSTOLIC BLOOD PRESSURE: 93 MMHG | TEMPERATURE: 97.5 F | HEART RATE: 56 BPM | DIASTOLIC BLOOD PRESSURE: 43 MMHG | WEIGHT: 315 LBS

## 2024-09-25 DIAGNOSIS — D63.1 ANEMIA DUE TO STAGE 4 CHRONIC KIDNEY DISEASE (MULTI): ICD-10-CM

## 2024-09-25 DIAGNOSIS — N18.4 ANEMIA DUE TO STAGE 4 CHRONIC KIDNEY DISEASE (MULTI): ICD-10-CM

## 2024-09-25 LAB
BASOPHILS # BLD AUTO: 0.03 X10*3/UL (ref 0–0.1)
BASOPHILS NFR BLD AUTO: 0.7 %
EOSINOPHIL # BLD AUTO: 0.25 X10*3/UL (ref 0–0.4)
EOSINOPHIL NFR BLD AUTO: 6.2 %
ERYTHROCYTE [DISTWIDTH] IN BLOOD BY AUTOMATED COUNT: 17.9 % (ref 11.5–14.5)
HCT VFR BLD AUTO: 31.3 % (ref 41–52)
HGB BLD-MCNC: 9.5 G/DL (ref 13.5–17.5)
IMM GRANULOCYTES # BLD AUTO: 0 X10*3/UL (ref 0–0.5)
IMM GRANULOCYTES NFR BLD AUTO: 0 % (ref 0–0.9)
LYMPHOCYTES # BLD AUTO: 1 X10*3/UL (ref 0.8–3)
LYMPHOCYTES NFR BLD AUTO: 24.8 %
MCH RBC QN AUTO: 32.6 PG (ref 26–34)
MCHC RBC AUTO-ENTMCNC: 30.4 G/DL (ref 32–36)
MCV RBC AUTO: 108 FL (ref 80–100)
MONOCYTES # BLD AUTO: 0.18 X10*3/UL (ref 0.05–0.8)
MONOCYTES NFR BLD AUTO: 4.5 %
NEUTROPHILS # BLD AUTO: 2.58 X10*3/UL (ref 1.6–5.5)
NEUTROPHILS NFR BLD AUTO: 63.8 %
PLATELET # BLD AUTO: 91 X10*3/UL (ref 150–450)
RBC # BLD AUTO: 2.91 X10*6/UL (ref 4.5–5.9)
WBC # BLD AUTO: 4 X10*3/UL (ref 4.4–11.3)

## 2024-09-25 PROCEDURE — 6350000001 HC RX 635 EPOETIN >10,000 UNITS: Mod: JG,EC

## 2024-09-25 PROCEDURE — 85025 COMPLETE CBC W/AUTO DIFF WBC: CPT

## 2024-09-25 PROCEDURE — 96372 THER/PROPH/DIAG INJ SC/IM: CPT

## 2024-09-25 PROCEDURE — 36415 COLL VENOUS BLD VENIPUNCTURE: CPT

## 2024-09-25 RX ORDER — FAMOTIDINE 10 MG/ML
20 INJECTION INTRAVENOUS ONCE AS NEEDED
OUTPATIENT
Start: 2024-10-09

## 2024-09-25 RX ORDER — EPINEPHRINE 0.3 MG/.3ML
0.3 INJECTION SUBCUTANEOUS EVERY 5 MIN PRN
OUTPATIENT
Start: 2024-10-09

## 2024-09-25 RX ORDER — ALBUTEROL SULFATE 0.83 MG/ML
3 SOLUTION RESPIRATORY (INHALATION) AS NEEDED
OUTPATIENT
Start: 2024-10-09

## 2024-09-25 RX ORDER — DIPHENHYDRAMINE HYDROCHLORIDE 50 MG/ML
50 INJECTION INTRAMUSCULAR; INTRAVENOUS AS NEEDED
OUTPATIENT
Start: 2024-10-09

## 2024-09-25 ASSESSMENT — PAIN SCALES - GENERAL: PAINLEVEL: 0-NO PAIN

## 2024-10-09 ENCOUNTER — INFUSION (OUTPATIENT)
Dept: HEMATOLOGY/ONCOLOGY | Facility: CLINIC | Age: 74
End: 2024-10-09
Payer: MEDICARE

## 2024-10-09 VITALS
DIASTOLIC BLOOD PRESSURE: 54 MMHG | RESPIRATION RATE: 16 BRPM | SYSTOLIC BLOOD PRESSURE: 125 MMHG | OXYGEN SATURATION: 94 % | BODY MASS INDEX: 42.51 KG/M2 | HEART RATE: 39 BPM | TEMPERATURE: 97.5 F | WEIGHT: 315 LBS

## 2024-10-09 DIAGNOSIS — N18.4 ANEMIA DUE TO STAGE 4 CHRONIC KIDNEY DISEASE (MULTI): ICD-10-CM

## 2024-10-09 DIAGNOSIS — D63.1 ANEMIA DUE TO STAGE 4 CHRONIC KIDNEY DISEASE (MULTI): ICD-10-CM

## 2024-10-09 LAB
BASOPHILS # BLD AUTO: 0.02 X10*3/UL (ref 0–0.1)
BASOPHILS NFR BLD AUTO: 0.6 %
EOSINOPHIL # BLD AUTO: 0.3 X10*3/UL (ref 0–0.4)
EOSINOPHIL NFR BLD AUTO: 8.5 %
ERYTHROCYTE [DISTWIDTH] IN BLOOD BY AUTOMATED COUNT: 15.7 % (ref 11.5–14.5)
HCT VFR BLD AUTO: 34.4 % (ref 41–52)
HGB BLD-MCNC: 10.2 G/DL (ref 13.5–17.5)
IMM GRANULOCYTES # BLD AUTO: 0 X10*3/UL (ref 0–0.5)
IMM GRANULOCYTES NFR BLD AUTO: 0 % (ref 0–0.9)
LYMPHOCYTES # BLD AUTO: 0.91 X10*3/UL (ref 0.8–3)
LYMPHOCYTES NFR BLD AUTO: 25.7 %
MCH RBC QN AUTO: 33.3 PG (ref 26–34)
MCHC RBC AUTO-ENTMCNC: 29.7 G/DL (ref 32–36)
MCV RBC AUTO: 112 FL (ref 80–100)
MONOCYTES # BLD AUTO: 0.22 X10*3/UL (ref 0.05–0.8)
MONOCYTES NFR BLD AUTO: 6.2 %
NEUTROPHILS # BLD AUTO: 2.09 X10*3/UL (ref 1.6–5.5)
NEUTROPHILS NFR BLD AUTO: 59 %
PLATELET # BLD AUTO: 73 X10*3/UL (ref 150–450)
RBC # BLD AUTO: 3.06 X10*6/UL (ref 4.5–5.9)
WBC # BLD AUTO: 3.5 X10*3/UL (ref 4.4–11.3)

## 2024-10-09 PROCEDURE — 85025 COMPLETE CBC W/AUTO DIFF WBC: CPT

## 2024-10-09 PROCEDURE — 36415 COLL VENOUS BLD VENIPUNCTURE: CPT

## 2024-10-09 PROCEDURE — 96372 THER/PROPH/DIAG INJ SC/IM: CPT

## 2024-10-09 PROCEDURE — 6350000001 HC RX 635 EPOETIN >10,000 UNITS: Mod: JG,EC

## 2024-10-09 RX ORDER — FAMOTIDINE 10 MG/ML
20 INJECTION INTRAVENOUS ONCE AS NEEDED
OUTPATIENT
Start: 2024-10-23

## 2024-10-09 RX ORDER — EPINEPHRINE 0.3 MG/.3ML
0.3 INJECTION SUBCUTANEOUS EVERY 5 MIN PRN
OUTPATIENT
Start: 2024-10-23

## 2024-10-09 RX ORDER — ALBUTEROL SULFATE 0.83 MG/ML
3 SOLUTION RESPIRATORY (INHALATION) AS NEEDED
OUTPATIENT
Start: 2024-10-23

## 2024-10-09 RX ORDER — DIPHENHYDRAMINE HYDROCHLORIDE 50 MG/ML
50 INJECTION INTRAMUSCULAR; INTRAVENOUS AS NEEDED
OUTPATIENT
Start: 2024-10-23

## 2024-10-09 ASSESSMENT — PAIN SCALES - GENERAL: PAINLEVEL: 0-NO PAIN

## 2024-10-09 NOTE — PROGRESS NOTES
During check in pt HR showing low 40s-high 30s. RN assessed peripheral pulses and auscultated apical HR, counting 39BMP. Cardiologist contacted and gave OK for epo today, instructed pt to reduce Carvedilol to 0.5tab 2x/day. Instructions printed on AVS and verbally confirmed with pt. Pt tolerated treatment without incident. Denies questions, concerns, or comments at this time. Discharged home with wheelchair.

## 2024-10-15 ENCOUNTER — APPOINTMENT (OUTPATIENT)
Dept: NEPHROLOGY | Facility: CLINIC | Age: 74
End: 2024-10-15
Payer: MEDICARE

## 2024-10-15 VITALS
RESPIRATION RATE: 16 BRPM | TEMPERATURE: 97.3 F | HEART RATE: 54 BPM | OXYGEN SATURATION: 92 % | DIASTOLIC BLOOD PRESSURE: 58 MMHG | WEIGHT: 315 LBS | SYSTOLIC BLOOD PRESSURE: 144 MMHG | BODY MASS INDEX: 40.43 KG/M2 | HEIGHT: 74 IN

## 2024-10-15 DIAGNOSIS — N28.9 KIDNEY DISEASE: ICD-10-CM

## 2024-10-15 DIAGNOSIS — I12.9 HYPERTENSIVE CHRONIC KIDNEY DISEASE WITH STAGE 1 THROUGH STAGE 4 CHRONIC KIDNEY DISEASE, OR UNSPECIFIED CHRONIC KIDNEY DISEASE: ICD-10-CM

## 2024-10-15 DIAGNOSIS — N18.32 CHRONIC KIDNEY DISEASE, STAGE 3B (MULTI): Primary | ICD-10-CM

## 2024-10-15 DIAGNOSIS — M10.00 IDIOPATHIC GOUT, UNSPECIFIED CHRONICITY, UNSPECIFIED SITE: ICD-10-CM

## 2024-10-15 PROCEDURE — 4010F ACE/ARB THERAPY RXD/TAKEN: CPT | Performed by: INTERNAL MEDICINE

## 2024-10-15 PROCEDURE — 3078F DIAST BP <80 MM HG: CPT | Performed by: INTERNAL MEDICINE

## 2024-10-15 PROCEDURE — 1157F ADVNC CARE PLAN IN RCRD: CPT | Performed by: INTERNAL MEDICINE

## 2024-10-15 PROCEDURE — 3008F BODY MASS INDEX DOCD: CPT | Performed by: INTERNAL MEDICINE

## 2024-10-15 PROCEDURE — 1159F MED LIST DOCD IN RCRD: CPT | Performed by: INTERNAL MEDICINE

## 2024-10-15 PROCEDURE — 3060F POS MICROALBUMINURIA REV: CPT | Performed by: INTERNAL MEDICINE

## 2024-10-15 PROCEDURE — 99215 OFFICE O/P EST HI 40 MIN: CPT | Performed by: INTERNAL MEDICINE

## 2024-10-15 PROCEDURE — 3077F SYST BP >= 140 MM HG: CPT | Performed by: INTERNAL MEDICINE

## 2024-10-15 RX ORDER — FEBUXOSTAT 40 MG/1
40 TABLET, FILM COATED ORAL DAILY
Qty: 30 TABLET | Refills: 11 | Status: SHIPPED | OUTPATIENT
Start: 2024-10-15 | End: 2025-10-15

## 2024-10-15 NOTE — PATIENT INSTRUCTIONS
Dear Aly it was nice meeting you nephrology clinic today-discussed the following    # Chronic kidney disease stage III baseline kidney function 30-40%.  Currently function is down to 29% based on blood work done in August-will repeat blood work today for clearance regarding umbilicus hernia repair    # Hypertension-in good control.  Continue carvedilol 12.5 mg twice daily, furosemide/Lasix 40 mg, and hydralazine 50 mg twice daily.  Lisinopril is on hold.  Continue same.  Heart rate is low today-consider dropping carvedilol to 6.25 twice daily-discussed with cardiology      # Anemia/pancytopenia-continue to follow the blood doctor.  Less likely kidney related    # Possible gout-will start Uloric 40 mg    # Chronic urine retention/chronic UTI-continue to follow with urology    # Based on repeat blood work-if you continue to have high acid buildup will start sodium bicarbonate twice daily    Follow-up in 6 months with repeat blood work and urinalysis prior to next visit    Milli Mott MD, MS, ANNIKA CHOI   Clinical  - [employer]   Nephrologist - [facility]

## 2024-10-15 NOTE — PROGRESS NOTES
"Subjective       Aly Mccarty is a 74 y.o. male who has past medical history of pancytopenia, chronic UTI, chronic urine retention status post self-catheterization, aortic valve stenosis status post TAVR, hypertension, coronary artery disease, chronic kidney disease was coming to see me for initial consultation for CKD management per PCP    Aly was accompanied by his wife Sole today. Last office visit was in April 2024. Since the last office visit, he had an acute exacerbation of CHF. To reduce lower leg and pulmonary edema, he was changed from hydrochlorothiazide to furosemide 40 mg once daily. After starting on the water pill, he reports a resolution of fluid overload symptoms. He was set to undergo an umbilical hernia repair in August 2024, but the procedure was cancelled after his labs showed a most recent serum creatinine of 2.34 and eGFR of 29. This is slightly below his baseline. He hopes to be cleared for this surgery.     Prior Notes:     Patient came today with his wife \"Sole \".  No continued complaints or concerns.  He follows with urology for chronic urinary retention.  He self catheterize himself 1-twice daily.  He checks blood pressure at home and average reading 120//80.  Blood pressure is in target today.  He has chronic kidney disease for many years.  He had kidney failure 6-7 years ago that he was on dialysis while inpatient.  He tries to follow low-salt diet.  No significant NSAID use at home.  He reports possible gout flares.  No nephrolithiasis    Social history: , remote history of smoking, occasional marijuana  Surgical history: TAVR  Family history: Irrelevant      Objective   /58 (BP Location: Left arm, Patient Position: Sitting, BP Cuff Size: Large adult)   Pulse 54   Temp 36.3 °C (97.3 °F)   Resp 16   Ht 1.88 m (6' 2\")   Wt 145 kg (320 lb)   SpO2 92%   BMI 41.09 kg/m²   Wt Readings from Last 3 Encounters:   10/15/24 145 kg (320 lb)   10/09/24 (!) 150 " kg (331 lb 2.1 oz)   09/25/24 150 kg (329 lb 12.9 oz)       Physical Exam    General appearance: no distress awake and alert on room air, obese, no significant hypervolemia exam  Eyes: non-icteric  HEENT: atrumatic head, PEERLA, moist mucosa  Skin: no apparent rash  Heart: NSR, S1, S2 normal, no murmur or gallop  Lungs: Symmetrical expansion,CTA bilat no wheezing/crackles  Abdomen: soft, nt/nd, obese  Extremities: no edema bilat  Neuro: No FND,asterixis, no focal deficits noticed        Review of Systems     Constitutional: no fever, no chills, no recent weight gain and no recent weight loss.   Eyes: no blurred vision and no diplopia.   ENT: no hearing loss, no earache, no sore throat, no swollen glands in the neck and no nasal discharge.   Cardiovascular: no chest pain, no palpitations and no lower extremity edema.   Respiratory: no shortness of breath, no chronic cough and no shortness of breath during exertion.   Gastrointestinal: no abdominal pain, no constipation, no heartburn, no vomiting, no bloody stools and no change in bowel movements.   Genitourinary: no dysuria and no hematuria.   Musculoskeletal: no arthralgias and no myalgias.   Skin: no rashes and no skin lesions.   Neurological: no headaches and no dizziness.   Psychiatric: no confusion, no depression and no anxiety.   Endocrine: no heat intolerance, no cold intolerance, appetite not increased, no thyroid disorder, no increased urinary frequency and no dry skin.   Hematologic/Lymphatic: does not bleed easily and does not bruise easily.   All other systems have been reviewed and are negative for complaint.         Data Review        Results from last 7 days   Lab Units 10/09/24  1130   WBC AUTO x10*3/uL 3.5*   HEMOGLOBIN g/dL 10.2*   HEMATOCRIT % 34.4*   PLATELETS AUTO x10*3/uL 73*            Lab Results   Component Value Date    URICACID 8.0 (H) 05/02/2024           Lab Results   Component Value Date    HGBA1C 4.8 07/12/2023                 No lab  "exists for component: \"CR\", \"PHOSPHORUS\"        Albumin/Creatinine Ratio   Date Value Ref Range Status   06/07/2024 147.8 (H) <30.0 ug/mg Creat Final            RFP  Recent Labs     08/14/24  1026 07/17/24  1112 07/03/24  0642 07/02/24  0653 07/01/24  0659 05/02/24  1037 02/29/24  1014 11/30/23  1017 08/24/23  1315 11/29/22  0947 11/23/22  0519   * 138 145 144 139 133* 134*   < > 128*   < > 140   K 4.6 3.6 3.8 3.6 3.9 5.0 4.7   < > 4.2   < > 4.0    103 106 107 104 103 104   < > 96*   < > 107   CO2 20* 26 33* 31 23 18* 21   < > 22   < > 25   BUN 49* 18 17 18 20 36* 42*   < > 36*   < > 17   CREATININE 2.34* 1.50* 1.25 1.25 1.42* 1.87* 1.83*   < > 1.89*   < > 1.40*   GLUCOSE 104* 103* 95 89 126* 119* 119*   < > 105*   < > 96   CALCIUM 8.6 9.0 8.5* 8.5* 9.5 9.0 9.0   < > 9.0   < > 8.7   PHOS 4.5  --  3.0 3.7  --  3.7  --   --  2.7  --  2.9   EGFR 29* 49* 61 61 52* 37* 39*   < >  --   --   --    ANIONGAP 12 13 10 10 16 17 14   < > 14   < > 12    < > = values in this interval not displayed.        Urineanalysis  Recent Labs     09/10/24  1039 06/10/24  1444 02/07/24  1523 12/09/22  1157 11/04/22  1115 10/28/22  1100 05/04/20  1246 05/15/19  1000 04/01/19  1100 02/12/19  1032 01/09/19  0942 12/20/18  1715 07/11/18  1050   COLORU Dark-Yellow Dark-Yellow Orange* YELLOW YELLOW KIM YELLOW KIM YELLOW RED YELLOW YELLOW RED   APPEARANCEU Turbid* Ex.Turbid* Clear CLEAR HAZY HAZY HAZY HAZY HAZY CLOUDY HAZY HAZY TURBID   SPECGRAVU 1.014 1.015 1.020 1.010 1.004* 1.008 1.006 1.009 1.009 1.010 1.005 1.008 1.005   HUGH 5.5 5.5 5.5 6.5 7.0 6.0 7.0 6.0 6.0 6.0 6.0 5.0 7.0   PROTUR 20 (TRACE) 30 (1+)* 30 (1+) NEGATIVE NEGATIVE 30(1+)* NEGATIVE NEGATIVE NEGATIVE 100 (2+)* NEGATIVE NEGATIVE >=500(3+)*   GLUCOSEU Normal Normal 100 (1+)* NEGATIVE NEGATIVE NEGATIVE NEGATIVE NEGATIVE NEGATIVE NEGATIVE NEGATIVE NEGATIVE NEGATIVE   BLOODU NEGATIVE 0.5 (2+)* LARGE (3+)* TRACE* MODERATE (2+)* MODERATE(2+)* SMALL(1+)* LARGE(3+)* " SMALL(1+)* LARGE (3+)* SMALL(1+)* MODERATE(2+)* LARGE(3+)*   KETONESU NEGATIVE NEGATIVE NEGATIVE NEGATIVE NEGATIVE NEGATIVE NEGATIVE NEGATIVE NEGATIVE NEGATIVE NEGATIVE NEGATIVE NEGATIVE   BILIRUBINU NEGATIVE NEGATIVE NEGATIVE NEGATIVE NEGATIVE NEGATIVE NEGATIVE NEGATIVE NEGATIVE NEGATIVE NEGATIVE NEGATIVE NEGATIVE   NITRITEU NEGATIVE 1+* POSITIVE* NEGATIVE NEGATIVE NEGATIVE POSITIVE* POSITIVE* NEGATIVE NEGATIVE NEGATIVE NEGATIVE NEGATIVE   LEUKOCYTESU 500 Meron/µL* 500 Meron/µL*  --  LARGE (3+)* LARGE (3+)* LARGE(3+)* LARGE(3+)* LARGE(3+)* LARGE(3+)* LARGE (3+)* LARGE(3+)* LARGE(3+)* LARGE(3+)*       Urine Electrolytes  Recent Labs     09/10/24  1039 06/10/24  1444 06/07/24  1148 02/07/24  1523 12/09/22  1157 11/04/22  1115 10/28/22  1100 05/04/20  1246 05/15/19  1000 04/01/19  1100 02/12/19  1032 01/09/19  0942 12/20/18  1715 07/11/18  1050   CREATU  --   --  38.7  --   --   --   --   --   --   --   --   --   --   --    PROTUR 20 (TRACE) 30 (1+)*  --  30 (1+) NEGATIVE NEGATIVE 30(1+)* NEGATIVE NEGATIVE NEGATIVE 100 (2+)* NEGATIVE NEGATIVE >=500(3+)*   ALBUMINUR  --   --  57.2  --   --   --   --   --   --   --   --   --   --   --    MICROALBCREA  --   --  147.8*  --   --   --   --   --   --   --   --   --   --   --         Urine Micro  Recent Labs     09/10/24  1039 06/10/24  1444 12/09/22  1157 11/12/22  0800 11/04/22  1115 10/28/22  1100 05/04/20  1246 05/15/19  1000 04/01/19  1100 02/12/19  1032 01/09/19  0942 12/20/18  1715 07/11/18  1050 11/30/17  1400 10/06/17  0400   WBCU >50* >50* 55* 115* 35* >182* >182* >182* >182* 93* >182* >182* * >182* >100*   RBCU 1-2 >20* <1 3 5 25* 12* 111* 18* >182* 14* 7* >100* 2 0-5   SQUAMEPIU  --   --  <1  --   --  3 <1 4  --  6 <1  --   --  <1  --    BACTERIAU 1+* 2+* 1+* 3+* 4+* 2+* 3+* 4+* 1+*  --  1+* 1+*  --   --  3+*   MUCUSU FEW FEW FEW 2+ 4+ 1+ 2+ FEW  --   --   --   --   --   --   --         Iron  Recent Labs     06/19/24  1156 05/02/24  1037 02/29/24  1014  11/30/23  1017 08/24/23  1328 08/24/23  1315   IRON 71 88 144 137 CANCELED 119   TIBC 338 288 332 347 CANCELED 300   IRONSAT 21* 31 43 39 CANCELED 40   FERRITIN 117 248 185 104 CANCELED 137          Current Outpatient Medications on File Prior to Visit   Medication Sig Dispense Refill    acetaminophen (Tylenol) 500 mg tablet Take 1-2 by mouth every 6 hours as needed for pain      allopurinol (Zyloprim) 100 mg tablet Take 1 tablet (100 mg) by mouth once daily. 30 tablet 11    carvedilol (Coreg) 25 mg tablet Take 1 tablet (25 mg) by mouth 2 times daily (morning and late afternoon). Take with food. (Patient taking differently: Take 0.5 tablets (12.5 mg) by mouth 2 times daily (morning and late afternoon). Take with food.) 180 tablet 3    cetirizine (ZyrTEC) 10 mg tablet Take 1 tablet (10 mg) by mouth once daily in the evening.      folic acid (Folvite) 1 mg tablet TAKE 1 TABLET BY MOUTH EVERY DAY 90 tablet 1    furosemide (Lasix) 40 mg tablet Take 1 tablet (40 mg) by mouth once daily. 90 tablet 3    hydrALAZINE (Apresoline) 50 mg tablet Take 1 tablet (50 mg) by mouth 2 times a day. 180 tablet 3    ketoconazole (NIZOral) 2 % cream APPLY TO AFFECTED AREA EVERY MORNING TILL CLEAR, REPEAT AS NEEDED.      levothyroxine (Synthroid, Levoxyl) 88 mcg tablet TAKE 1 TABLET BY MOUTH EVERY DAY ON EMPTY STOMACH 90 tablet 1    phenazopyridine (Pyridium) 200 mg tablet Take 1 tablet (200 mg) by mouth 3 times daily (morning, midday, late afternoon).      predniSONE (Deltasone) 10 mg tablet if needed.      Ghazal-Chi Rx 1- mg-mg-mcg tablet TAKE 1 TABLET BY MOUTH EVERY DAY WITH FOOD 90 tablet 3    tamsulosin (Flomax) 0.4 mg 24 hr capsule Take 1 capsule (0.4 mg) by mouth once daily. 30 capsule 11    Xarelto 20 mg tablet Take 1 tablet (20 mg) by mouth once daily in the evening. Take with meals. 90 tablet 3    cyanocobalamin (Vitamin B-12) 1,000 mcg tablet Take 2 tablets (2,000 mcg) by mouth every other day.      fluconazole (Diflucan)  150 mg tablet TAKE ONCE WEEKLY FOR ONE MONTH      lisinopril 40 mg tablet Take 1 tablet (40 mg) by mouth once daily. (Patient not taking: Reported on 10/9/2024) 90 tablet 3    thiamine 100 mg tablet Take 1 tablet (100 mg) by mouth once daily.       No current facility-administered medications on file prior to visit.           Assessment and Plan       Aly Mccarty  is a 74 y.o. male who has past medical history of  pancytopenia, chronic UTI, chronic urine retention status post self-catheterization, aortic valve stenosis status post TAVR, hypertension, coronary artery disease, chronic kidney disease was coming to see me for initial consultation for CKD management per PCP    Impression    # Chronic kidney disease -G3 B/A2-possible atherosclerotic cardiovascular disease/prior acute kidney injury  -Patient was on dialysis many years ago as an inpatient and the setting of sepsis  -Baseline serum creatinine 1.8-2.2, GFR 30-40 mill per minute per 1.73 m²-currently stable.  - Kidney ultrasound done January 2024 was reviewed and showed B kidneys bilateral with no masses or hydronephrosis  - Latest serum creatinine 2.34 and GFR 29.   - He was switched from hydrochlorothiazide to furosemide which could explain the decrease in kidney function.  - We will repeat a renal functional panel to reassess candidacy for umbilical hernia repair.       # Chronic mild hyponatremia sodium 131-we will monitor    # Mild Acidosis  - Discussed with patient that the most recent blood work showed low levels of bicarbonate  - Will start on sodium bicarbonate twice daily if repeat blood work shows acidosis    # BP - today at office is in target with negative orthostatic hypotension  -Current medication carvedilol 12.5 mg twice daily, furosemide 40 mg, hydralazine 50 mg twice daily. Lisinopril is on hold.  -No changes    #Bradycardia  - Discussed with patient that his heart rate was still low despite dropping from 25 mg carvedilol to 12.5 mg  carvedilol. Follow up with cardiology to potentially drop to 6.25 mg daily.       #Anemia/pancytopenia-most likely CKD related.  Most likely bone marrow related.  Continue to follow with hematology.  Will consider MEENA as needed-no immediate indication    #(CKD)-MBD  -Within normal calcium, phosphorus, albumin.  Recheck PTH and vitamin D    # CVS  -Aortic valve stenosis status post TAVR  -Currently is not on statins or SGL 2 inhibitors    # No history of diabetes    # Chronic urine retention-he follows with urology.  He self catheterize himself 8-11 times daily.  No evidence of urinary retention or hydronephrosis per most recent kidney ultrasound done in January 2024    # Possible history of recurrent gout-will monitor uric acid.    - Stop allopurinol due to low GFR  - Start Uloric (febuxustat) 40 mg daily    #Others  - No NSAIDs, no contrast as possible. If to be done- we recommend holding ACEi/ARBS/diuretics 24 hrs prior to contrast exposure and ensure appropriate hydration   - Ensure well hydration  - Limit salt in diet  - No smoking    Patient received CKD education and counselling    Follow-up in 6 months with renal order contrasted prior to next visit    Nile Morin, Second Year Medical Student  [employer]    Milli Mott MD, MS, ANNIKA CHOI   Clinical  - [employer]   Nephrologist - [facility] - [facility]

## 2024-10-15 NOTE — PROGRESS NOTES
"Subjective       Aly Mccarty is a 74 y.o. male who has past medical history of pancytopenia, chronic UTI, chronic urine retention status post self-catheterization, aortic valve stenosis status post TAVR, hypertension, coronary artery disease, chronic kidney disease was coming to see me for initial consultation for CKD management per PCP    Aly was accompanied by his wife Sole today. Last office visit was in April 2024. Since the last office visit, he had an acute exacerbation of CHF. To reduce lower leg and pulmonary edema, he was changed from hydrochlorothiazide to furosemide 40 mg once daily. After starting on the water pill, he reports a resolution of fluid overload symptoms. He was set to undergo an umbilical hernia repair in August 2024, but the procedure was cancelled after his labs showed a most recent serum creatinine of 2.34 and eGFR of 29. This is slightly below his baseline. He hopes to be cleared for this surgery.     Prior Notes:     Patient came today with his wife \"Sole \".  No continued complaints or concerns.  He follows with urology for chronic urinary retention.  He self catheterize himself 1-twice daily.  He checks blood pressure at home and average reading 120//80.  Blood pressure is in target today.  He has chronic kidney disease for many years.  He had kidney failure 6-7 years ago that he was on dialysis while inpatient.  He tries to follow low-salt diet.  No significant NSAID use at home.  He reports possible gout flares.  No nephrolithiasis    Social history: , remote history of smoking, occasional marijuana  Surgical history: TAVR  Family history: Irrelevant      Objective   /58 (BP Location: Left arm, Patient Position: Sitting, BP Cuff Size: Large adult)   Pulse 54   Temp 36.3 °C (97.3 °F)   Resp 16   Ht 1.88 m (6' 2\")   Wt 145 kg (320 lb)   SpO2 92%   BMI 41.09 kg/m²   Wt Readings from Last 3 Encounters:   10/15/24 145 kg (320 lb)   10/09/24 (!) 150 " kg (331 lb 2.1 oz)   09/25/24 150 kg (329 lb 12.9 oz)       Physical Exam    General appearance: no distress awake and alert on room air, obese, no significant hypervolemia exam  Eyes: non-icteric  HEENT: atrumatic head, PEERLA, moist mucosa  Skin: no apparent rash  Heart: NSR, S1, S2 normal, no murmur or gallop  Lungs: Symmetrical expansion,CTA bilat no wheezing/crackles  Abdomen: soft, nt/nd, obese  Extremities: no edema bilat  Neuro: No FND,asterixis, no focal deficits noticed        Review of Systems     Constitutional: no fever, no chills, no recent weight gain and no recent weight loss.   Eyes: no blurred vision and no diplopia.   ENT: no hearing loss, no earache, no sore throat, no swollen glands in the neck and no nasal discharge.   Cardiovascular: no chest pain, no palpitations and no lower extremity edema.   Respiratory: no shortness of breath, no chronic cough and no shortness of breath during exertion.   Gastrointestinal: no abdominal pain, no constipation, no heartburn, no vomiting, no bloody stools and no change in bowel movements.   Genitourinary: no dysuria and no hematuria.   Musculoskeletal: no arthralgias and no myalgias.   Skin: no rashes and no skin lesions.   Neurological: no headaches and no dizziness.   Psychiatric: no confusion, no depression and no anxiety.   Endocrine: no heat intolerance, no cold intolerance, appetite not increased, no thyroid disorder, no increased urinary frequency and no dry skin.   Hematologic/Lymphatic: does not bleed easily and does not bruise easily.   All other systems have been reviewed and are negative for complaint.         Data Review        Results from last 7 days   Lab Units 10/09/24  1130   WBC AUTO x10*3/uL 3.5*   HEMOGLOBIN g/dL 10.2*   HEMATOCRIT % 34.4*   PLATELETS AUTO x10*3/uL 73*            Lab Results   Component Value Date    URICACID 8.0 (H) 05/02/2024           Lab Results   Component Value Date    HGBA1C 4.8 07/12/2023                 No lab  "exists for component: \"CR\", \"PHOSPHORUS\"        Albumin/Creatinine Ratio   Date Value Ref Range Status   06/07/2024 147.8 (H) <30.0 ug/mg Creat Final            RFP  Recent Labs     08/14/24  1026 07/17/24  1112 07/03/24  0642 07/02/24  0653 07/01/24  0659 05/02/24  1037 02/29/24  1014 11/30/23  1017 08/24/23  1315 11/29/22  0947 11/23/22  0519   * 138 145 144 139 133* 134*   < > 128*   < > 140   K 4.6 3.6 3.8 3.6 3.9 5.0 4.7   < > 4.2   < > 4.0    103 106 107 104 103 104   < > 96*   < > 107   CO2 20* 26 33* 31 23 18* 21   < > 22   < > 25   BUN 49* 18 17 18 20 36* 42*   < > 36*   < > 17   CREATININE 2.34* 1.50* 1.25 1.25 1.42* 1.87* 1.83*   < > 1.89*   < > 1.40*   GLUCOSE 104* 103* 95 89 126* 119* 119*   < > 105*   < > 96   CALCIUM 8.6 9.0 8.5* 8.5* 9.5 9.0 9.0   < > 9.0   < > 8.7   PHOS 4.5  --  3.0 3.7  --  3.7  --   --  2.7  --  2.9   EGFR 29* 49* 61 61 52* 37* 39*   < >  --   --   --    ANIONGAP 12 13 10 10 16 17 14   < > 14   < > 12    < > = values in this interval not displayed.        Urineanalysis  Recent Labs     09/10/24  1039 06/10/24  1444 02/07/24  1523 12/09/22  1157 11/04/22  1115 10/28/22  1100 05/04/20  1246 05/15/19  1000 04/01/19  1100 02/12/19  1032 01/09/19  0942 12/20/18  1715 07/11/18  1050   COLORU Dark-Yellow Dark-Yellow Orange* YELLOW YELLOW KIM YELLOW KIM YELLOW RED YELLOW YELLOW RED   APPEARANCEU Turbid* Ex.Turbid* Clear CLEAR HAZY HAZY HAZY HAZY HAZY CLOUDY HAZY HAZY TURBID   SPECGRAVU 1.014 1.015 1.020 1.010 1.004* 1.008 1.006 1.009 1.009 1.010 1.005 1.008 1.005   HUGH 5.5 5.5 5.5 6.5 7.0 6.0 7.0 6.0 6.0 6.0 6.0 5.0 7.0   PROTUR 20 (TRACE) 30 (1+)* 30 (1+) NEGATIVE NEGATIVE 30(1+)* NEGATIVE NEGATIVE NEGATIVE 100 (2+)* NEGATIVE NEGATIVE >=500(3+)*   GLUCOSEU Normal Normal 100 (1+)* NEGATIVE NEGATIVE NEGATIVE NEGATIVE NEGATIVE NEGATIVE NEGATIVE NEGATIVE NEGATIVE NEGATIVE   BLOODU NEGATIVE 0.5 (2+)* LARGE (3+)* TRACE* MODERATE (2+)* MODERATE(2+)* SMALL(1+)* LARGE(3+)* " SMALL(1+)* LARGE (3+)* SMALL(1+)* MODERATE(2+)* LARGE(3+)*   KETONESU NEGATIVE NEGATIVE NEGATIVE NEGATIVE NEGATIVE NEGATIVE NEGATIVE NEGATIVE NEGATIVE NEGATIVE NEGATIVE NEGATIVE NEGATIVE   BILIRUBINU NEGATIVE NEGATIVE NEGATIVE NEGATIVE NEGATIVE NEGATIVE NEGATIVE NEGATIVE NEGATIVE NEGATIVE NEGATIVE NEGATIVE NEGATIVE   NITRITEU NEGATIVE 1+* POSITIVE* NEGATIVE NEGATIVE NEGATIVE POSITIVE* POSITIVE* NEGATIVE NEGATIVE NEGATIVE NEGATIVE NEGATIVE   LEUKOCYTESU 500 Meron/µL* 500 Meron/µL*  --  LARGE (3+)* LARGE (3+)* LARGE(3+)* LARGE(3+)* LARGE(3+)* LARGE(3+)* LARGE (3+)* LARGE(3+)* LARGE(3+)* LARGE(3+)*       Urine Electrolytes  Recent Labs     09/10/24  1039 06/10/24  1444 06/07/24  1148 02/07/24  1523 12/09/22  1157 11/04/22  1115 10/28/22  1100 05/04/20  1246 05/15/19  1000 04/01/19  1100 02/12/19  1032 01/09/19  0942 12/20/18  1715 07/11/18  1050   CREATU  --   --  38.7  --   --   --   --   --   --   --   --   --   --   --    PROTUR 20 (TRACE) 30 (1+)*  --  30 (1+) NEGATIVE NEGATIVE 30(1+)* NEGATIVE NEGATIVE NEGATIVE 100 (2+)* NEGATIVE NEGATIVE >=500(3+)*   ALBUMINUR  --   --  57.2  --   --   --   --   --   --   --   --   --   --   --    MICROALBCREA  --   --  147.8*  --   --   --   --   --   --   --   --   --   --   --         Urine Micro  Recent Labs     09/10/24  1039 06/10/24  1444 12/09/22  1157 11/12/22  0800 11/04/22  1115 10/28/22  1100 05/04/20  1246 05/15/19  1000 04/01/19  1100 02/12/19  1032 01/09/19  0942 12/20/18  1715 07/11/18  1050 11/30/17  1400 10/06/17  0400   WBCU >50* >50* 55* 115* 35* >182* >182* >182* >182* 93* >182* >182* * >182* >100*   RBCU 1-2 >20* <1 3 5 25* 12* 111* 18* >182* 14* 7* >100* 2 0-5   SQUAMEPIU  --   --  <1  --   --  3 <1 4  --  6 <1  --   --  <1  --    BACTERIAU 1+* 2+* 1+* 3+* 4+* 2+* 3+* 4+* 1+*  --  1+* 1+*  --   --  3+*   MUCUSU FEW FEW FEW 2+ 4+ 1+ 2+ FEW  --   --   --   --   --   --   --         Iron  Recent Labs     06/19/24  1156 05/02/24  1037 02/29/24  1014  11/30/23  1017 08/24/23  1328 08/24/23  1315   IRON 71 88 144 137 CANCELED 119   TIBC 338 288 332 347 CANCELED 300   IRONSAT 21* 31 43 39 CANCELED 40   FERRITIN 117 248 185 104 CANCELED 137          Current Outpatient Medications on File Prior to Visit   Medication Sig Dispense Refill    acetaminophen (Tylenol) 500 mg tablet Take 1-2 by mouth every 6 hours as needed for pain      allopurinol (Zyloprim) 100 mg tablet Take 1 tablet (100 mg) by mouth once daily. 30 tablet 11    carvedilol (Coreg) 25 mg tablet Take 1 tablet (25 mg) by mouth 2 times daily (morning and late afternoon). Take with food. (Patient taking differently: Take 0.5 tablets (12.5 mg) by mouth 2 times daily (morning and late afternoon). Take with food.) 180 tablet 3    cetirizine (ZyrTEC) 10 mg tablet Take 1 tablet (10 mg) by mouth once daily in the evening.      folic acid (Folvite) 1 mg tablet TAKE 1 TABLET BY MOUTH EVERY DAY 90 tablet 1    furosemide (Lasix) 40 mg tablet Take 1 tablet (40 mg) by mouth once daily. 90 tablet 3    hydrALAZINE (Apresoline) 50 mg tablet Take 1 tablet (50 mg) by mouth 2 times a day. 180 tablet 3    ketoconazole (NIZOral) 2 % cream APPLY TO AFFECTED AREA EVERY MORNING TILL CLEAR, REPEAT AS NEEDED.      levothyroxine (Synthroid, Levoxyl) 88 mcg tablet TAKE 1 TABLET BY MOUTH EVERY DAY ON EMPTY STOMACH 90 tablet 1    phenazopyridine (Pyridium) 200 mg tablet Take 1 tablet (200 mg) by mouth 3 times daily (morning, midday, late afternoon).      predniSONE (Deltasone) 10 mg tablet if needed.      Ghazal-Chi Rx 1- mg-mg-mcg tablet TAKE 1 TABLET BY MOUTH EVERY DAY WITH FOOD 90 tablet 3    tamsulosin (Flomax) 0.4 mg 24 hr capsule Take 1 capsule (0.4 mg) by mouth once daily. 30 capsule 11    Xarelto 20 mg tablet Take 1 tablet (20 mg) by mouth once daily in the evening. Take with meals. 90 tablet 3    cyanocobalamin (Vitamin B-12) 1,000 mcg tablet Take 2 tablets (2,000 mcg) by mouth every other day.      fluconazole (Diflucan)  150 mg tablet TAKE ONCE WEEKLY FOR ONE MONTH      lisinopril 40 mg tablet Take 1 tablet (40 mg) by mouth once daily. (Patient not taking: Reported on 10/9/2024) 90 tablet 3    thiamine 100 mg tablet Take 1 tablet (100 mg) by mouth once daily.       No current facility-administered medications on file prior to visit.           Assessment and Plan       Aly Mccarty  is a 74 y.o. male who has past medical history of  pancytopenia, chronic UTI, chronic urine retention status post self-catheterization, aortic valve stenosis status post TAVR, hypertension, coronary artery disease, chronic kidney disease was coming to see me for initial consultation for CKD management per PCP    Impression    # Chronic kidney disease -G3 B/A2-possible atherosclerotic cardiovascular disease/prior acute kidney injury  -Patient was on dialysis many years ago as an inpatient and the setting of sepsis  -Baseline serum creatinine 1.8-2.2, GFR 30-40 mill per minute per 1.73 m²-currently stable.  - Kidney ultrasound done January 2024 was reviewed and showed B kidneys bilateral with no masses or hydronephrosis  - Latest serum creatinine 2.34 and GFR 29.   - He was switched from hydrochlorothiazide to furosemide which could explain the decrease in kidney function.  - We will repeat a renal functional panel to reassess candidacy for umbilical hernia repair.       # Chronic mild hyponatremia sodium 131-we will monitor    # Mild Acidosis  - Discussed with patient that the most recent blood work showed low levels of bicarbonate  - Will start on sodium bicarbonate twice daily if repeat blood work shows acidosis    # BP - today at office is in target with negative orthostatic hypotension  -Current medication carvedilol 12.5 mg twice daily, furosemide 40 mg, hydralazine 50 mg twice daily. Lisinopril is on hold.  -No changes    #Bradycardia  - Discussed with patient that his heart rate was still low despite dropping from 25 mg carvedilol to 12.5 mg  carvedilol. Follow up with cardiology to potentially drop to 6.25 mg daily.       #Anemia/pancytopenia-most likely CKD related.  Most likely bone marrow related.  Continue to follow with hematology.  Will consider MEENA as needed-no immediate indication    #(CKD)-MBD  -Within normal calcium, phosphorus, albumin.  Recheck PTH and vitamin D    # CVS  -Aortic valve stenosis status post TAVR  -Currently is not on statins or SGL 2 inhibitors    # No history of diabetes    # Chronic urine retention-he follows with urology.  He self catheterize himself 8-11 times daily.  No evidence of urinary retention or hydronephrosis per most recent kidney ultrasound done in January 2024    # Possible history of recurrent gout-will monitor uric acid.    - Stop allopurinol due to low GFR  - Start Uloric (febuxustat) 40 mg daily    #Others  - No NSAIDs, no contrast as possible. If to be done- we recommend holding ACEi/ARBS/diuretics 24 hrs prior to contrast exposure and ensure appropriate hydration   - Ensure well hydration  - Limit salt in diet  - No smoking    Patient received CKD education and counselling    Follow-up in 6 months with renal order contrasted prior to next visit    Nile Morin, Second Year Medical Student  [employer]    Milli Mott MD, MS, ANNIKA CHOI   Clinical  - [employer]   Nephrologist - [facility] - [facility]

## 2024-10-23 ENCOUNTER — INFUSION (OUTPATIENT)
Dept: HEMATOLOGY/ONCOLOGY | Facility: CLINIC | Age: 74
End: 2024-10-23
Payer: MEDICARE

## 2024-10-23 VITALS
OXYGEN SATURATION: 93 % | DIASTOLIC BLOOD PRESSURE: 64 MMHG | HEART RATE: 55 BPM | BODY MASS INDEX: 41.99 KG/M2 | RESPIRATION RATE: 16 BRPM | WEIGHT: 315 LBS | SYSTOLIC BLOOD PRESSURE: 121 MMHG | TEMPERATURE: 96.8 F

## 2024-10-23 DIAGNOSIS — I12.9 HYPERTENSIVE CHRONIC KIDNEY DISEASE WITH STAGE 1 THROUGH STAGE 4 CHRONIC KIDNEY DISEASE, OR UNSPECIFIED CHRONIC KIDNEY DISEASE: ICD-10-CM

## 2024-10-23 DIAGNOSIS — N18.4 ANEMIA DUE TO STAGE 4 CHRONIC KIDNEY DISEASE (MULTI): ICD-10-CM

## 2024-10-23 DIAGNOSIS — D63.1 ANEMIA DUE TO STAGE 4 CHRONIC KIDNEY DISEASE (MULTI): ICD-10-CM

## 2024-10-23 DIAGNOSIS — N28.9 KIDNEY DISEASE: ICD-10-CM

## 2024-10-23 DIAGNOSIS — N18.32 CHRONIC KIDNEY DISEASE, STAGE 3B (MULTI): ICD-10-CM

## 2024-10-23 DIAGNOSIS — M10.00 IDIOPATHIC GOUT, UNSPECIFIED CHRONICITY, UNSPECIFIED SITE: ICD-10-CM

## 2024-10-23 LAB
ALBUMIN SERPL BCP-MCNC: 3.9 G/DL (ref 3.4–5)
ANION GAP SERPL CALC-SCNC: 14 MMOL/L (ref 10–20)
BASOPHILS # BLD AUTO: 0.01 X10*3/UL (ref 0–0.1)
BASOPHILS NFR BLD AUTO: 0.3 %
BUN SERPL-MCNC: 32 MG/DL (ref 6–23)
CALCIUM SERPL-MCNC: 9 MG/DL (ref 8.6–10.3)
CHLORIDE SERPL-SCNC: 103 MMOL/L (ref 98–107)
CO2 SERPL-SCNC: 22 MMOL/L (ref 21–32)
CREAT SERPL-MCNC: 1.78 MG/DL (ref 0.5–1.3)
EGFRCR SERPLBLD CKD-EPI 2021: 40 ML/MIN/1.73M*2
EOSINOPHIL # BLD AUTO: 0.3 X10*3/UL (ref 0–0.4)
EOSINOPHIL NFR BLD AUTO: 8.9 %
ERYTHROCYTE [DISTWIDTH] IN BLOOD BY AUTOMATED COUNT: 14 % (ref 11.5–14.5)
GLUCOSE SERPL-MCNC: 95 MG/DL (ref 74–99)
HCT VFR BLD AUTO: 36.4 % (ref 41–52)
HGB BLD-MCNC: 11.3 G/DL (ref 13.5–17.5)
IMM GRANULOCYTES # BLD AUTO: 0 X10*3/UL (ref 0–0.5)
IMM GRANULOCYTES NFR BLD AUTO: 0 % (ref 0–0.9)
LYMPHOCYTES # BLD AUTO: 0.94 X10*3/UL (ref 0.8–3)
LYMPHOCYTES NFR BLD AUTO: 28 %
MCH RBC QN AUTO: 33.2 PG (ref 26–34)
MCHC RBC AUTO-ENTMCNC: 31 G/DL (ref 32–36)
MCV RBC AUTO: 107 FL (ref 80–100)
MONOCYTES # BLD AUTO: 0.32 X10*3/UL (ref 0.05–0.8)
MONOCYTES NFR BLD AUTO: 9.5 %
NEUTROPHILS # BLD AUTO: 1.79 X10*3/UL (ref 1.6–5.5)
NEUTROPHILS NFR BLD AUTO: 53.3 %
NRBC BLD-RTO: ABNORMAL /100{WBCS}
OVALOCYTES BLD QL SMEAR: NORMAL
PHOSPHATE SERPL-MCNC: 3.7 MG/DL (ref 2.5–4.9)
PLATELET # BLD AUTO: 93 X10*3/UL (ref 150–450)
POTASSIUM SERPL-SCNC: 4 MMOL/L (ref 3.5–5.3)
RBC # BLD AUTO: 3.4 X10*6/UL (ref 4.5–5.9)
RBC MORPH BLD: NORMAL
SODIUM SERPL-SCNC: 135 MMOL/L (ref 136–145)
SPHEROCYTES BLD QL SMEAR: NORMAL
WBC # BLD AUTO: 3.4 X10*3/UL (ref 4.4–11.3)

## 2024-10-23 PROCEDURE — 85025 COMPLETE CBC W/AUTO DIFF WBC: CPT

## 2024-10-23 PROCEDURE — 36415 COLL VENOUS BLD VENIPUNCTURE: CPT

## 2024-10-23 PROCEDURE — 80069 RENAL FUNCTION PANEL: CPT

## 2024-10-23 RX ORDER — DIPHENHYDRAMINE HYDROCHLORIDE 50 MG/ML
50 INJECTION INTRAMUSCULAR; INTRAVENOUS AS NEEDED
OUTPATIENT
Start: 2024-11-06

## 2024-10-23 RX ORDER — EPINEPHRINE 0.3 MG/.3ML
0.3 INJECTION SUBCUTANEOUS EVERY 5 MIN PRN
OUTPATIENT
Start: 2024-11-06

## 2024-10-23 RX ORDER — FAMOTIDINE 10 MG/ML
20 INJECTION INTRAVENOUS ONCE AS NEEDED
OUTPATIENT
Start: 2024-11-06

## 2024-10-23 RX ORDER — ALBUTEROL SULFATE 0.83 MG/ML
3 SOLUTION RESPIRATORY (INHALATION) AS NEEDED
OUTPATIENT
Start: 2024-11-06

## 2024-10-23 ASSESSMENT — PAIN SCALES - GENERAL: PAINLEVEL_OUTOF10: 0-NO PAIN

## 2024-10-23 NOTE — PROGRESS NOTES
Pt came in today for epo injection, hgb increased from 10.2 to 11.3, held epo and notified CNP per protocol. Pt discharged home.

## 2024-11-06 ENCOUNTER — INFUSION (OUTPATIENT)
Dept: HEMATOLOGY/ONCOLOGY | Facility: CLINIC | Age: 74
End: 2024-11-06
Payer: MEDICARE

## 2024-11-06 DIAGNOSIS — D63.1 ANEMIA DUE TO STAGE 4 CHRONIC KIDNEY DISEASE (MULTI): ICD-10-CM

## 2024-11-06 DIAGNOSIS — N18.4 ANEMIA DUE TO STAGE 4 CHRONIC KIDNEY DISEASE (MULTI): ICD-10-CM

## 2024-11-06 LAB
BASOPHILS # BLD AUTO: 0.02 X10*3/UL (ref 0–0.1)
BASOPHILS NFR BLD AUTO: 0.6 %
EOSINOPHIL # BLD AUTO: 0.25 X10*3/UL (ref 0–0.4)
EOSINOPHIL NFR BLD AUTO: 7.9 %
ERYTHROCYTE [DISTWIDTH] IN BLOOD BY AUTOMATED COUNT: 14 % (ref 11.5–14.5)
HCT VFR BLD AUTO: 37 % (ref 41–52)
HGB BLD-MCNC: 11.4 G/DL (ref 13.5–17.5)
IMM GRANULOCYTES # BLD AUTO: 0 X10*3/UL (ref 0–0.5)
IMM GRANULOCYTES NFR BLD AUTO: 0 % (ref 0–0.9)
LYMPHOCYTES # BLD AUTO: 0.9 X10*3/UL (ref 0.8–3)
LYMPHOCYTES NFR BLD AUTO: 28.5 %
MCH RBC QN AUTO: 32.4 PG (ref 26–34)
MCHC RBC AUTO-ENTMCNC: 30.8 G/DL (ref 32–36)
MCV RBC AUTO: 105 FL (ref 80–100)
MONOCYTES # BLD AUTO: 0.3 X10*3/UL (ref 0.05–0.8)
MONOCYTES NFR BLD AUTO: 9.5 %
NEUTROPHILS # BLD AUTO: 1.69 X10*3/UL (ref 1.6–5.5)
NEUTROPHILS NFR BLD AUTO: 53.5 %
PLATELET # BLD AUTO: 108 X10*3/UL (ref 150–450)
RBC # BLD AUTO: 3.52 X10*6/UL (ref 4.5–5.9)
WBC # BLD AUTO: 3.2 X10*3/UL (ref 4.4–11.3)

## 2024-11-06 PROCEDURE — 36415 COLL VENOUS BLD VENIPUNCTURE: CPT

## 2024-11-06 PROCEDURE — 85025 COMPLETE CBC W/AUTO DIFF WBC: CPT

## 2024-11-06 NOTE — PROGRESS NOTES
Pt did not require treatment today. Denies questions, concerns, or comments at this time. Discharged home with wheelchair

## 2024-11-13 ENCOUNTER — OFFICE VISIT (OUTPATIENT)
Dept: HEMATOLOGY/ONCOLOGY | Facility: CLINIC | Age: 74
End: 2024-11-13
Payer: MEDICARE

## 2024-11-13 VITALS
WEIGHT: 315 LBS | TEMPERATURE: 97 F | BODY MASS INDEX: 41.79 KG/M2 | RESPIRATION RATE: 17 BRPM | HEART RATE: 60 BPM | SYSTOLIC BLOOD PRESSURE: 137 MMHG | DIASTOLIC BLOOD PRESSURE: 70 MMHG | OXYGEN SATURATION: 94 %

## 2024-11-13 DIAGNOSIS — D50.9 IRON DEFICIENCY ANEMIA, UNSPECIFIED IRON DEFICIENCY ANEMIA TYPE: ICD-10-CM

## 2024-11-13 DIAGNOSIS — D63.1 ANEMIA DUE TO STAGE 4 CHRONIC KIDNEY DISEASE (MULTI): ICD-10-CM

## 2024-11-13 DIAGNOSIS — D61.818 PANCYTOPENIA: ICD-10-CM

## 2024-11-13 DIAGNOSIS — I10 ESSENTIAL (PRIMARY) HYPERTENSION: ICD-10-CM

## 2024-11-13 DIAGNOSIS — N18.32 CHRONIC KIDNEY DISEASE, STAGE 3B (MULTI): ICD-10-CM

## 2024-11-13 DIAGNOSIS — R10.84 GENERALIZED ABDOMINAL PAIN: ICD-10-CM

## 2024-11-13 DIAGNOSIS — M10.00 IDIOPATHIC GOUT, UNSPECIFIED CHRONICITY, UNSPECIFIED SITE: ICD-10-CM

## 2024-11-13 DIAGNOSIS — N18.4 ANEMIA DUE TO STAGE 4 CHRONIC KIDNEY DISEASE (MULTI): ICD-10-CM

## 2024-11-13 DIAGNOSIS — N28.9 KIDNEY DISEASE: ICD-10-CM

## 2024-11-13 DIAGNOSIS — I12.9 HYPERTENSIVE CHRONIC KIDNEY DISEASE WITH STAGE 1 THROUGH STAGE 4 CHRONIC KIDNEY DISEASE, OR UNSPECIFIED CHRONIC KIDNEY DISEASE: ICD-10-CM

## 2024-11-13 PROCEDURE — 99214 OFFICE O/P EST MOD 30 MIN: CPT

## 2024-11-13 PROCEDURE — 3078F DIAST BP <80 MM HG: CPT

## 2024-11-13 PROCEDURE — 1126F AMNT PAIN NOTED NONE PRSNT: CPT

## 2024-11-13 PROCEDURE — 3075F SYST BP GE 130 - 139MM HG: CPT

## 2024-11-13 PROCEDURE — 3060F POS MICROALBUMINURIA REV: CPT

## 2024-11-13 PROCEDURE — 1159F MED LIST DOCD IN RCRD: CPT

## 2024-11-13 PROCEDURE — 1157F ADVNC CARE PLAN IN RCRD: CPT

## 2024-11-13 PROCEDURE — 1160F RVW MEDS BY RX/DR IN RCRD: CPT

## 2024-11-13 ASSESSMENT — PAIN SCALES - GENERAL: PAINLEVEL_OUTOF10: 0-NO PAIN

## 2024-11-13 NOTE — PROGRESS NOTES
Patient Visit Information:   Visit Type: Follow Up Visit      History of Present Illness:   Hematology History:  Patient is a 74-year-old male that presents with his wife, Sole, for follow up of pancytopenia. Patient presents in a wheelchair.  Outside lab records indicate pancytopenia ongoing since 2014.  Wife states he has been hospitalized in 2017 for over 5 months for osteomyelitis and septicemia.  He has chronic urinary tract infections. Last imaging CT chest abdomen and pelvis in 2022 reveal CAD, atherosclerosis, calcified aortic valve, pulmonary hypertension, dilated pulmonary artery and hernia.    Head CT in 2021 revealed maxillary sinus thickening but no acute issues.   It was also noted in 2017 that he had internal bleeding and a colonoscopy and EGD was performed in which the patient states has resolved. Platelet and blood transfusions administered.    Bone Marrow Biopsy  FINAL DIAGNOSIS 6/6/2024   A-C. BONE MARROW CLOT, CORE BIOPSY, AND ASPIRATE SMEAR, RIGHT ILIAC CREST:   -- SLIGHTLY HYPERCELLULAR BONE MARROW FOR AGE (50-60%) WITH MATURING TRILINEAGE HEMATOPOIESIS.   There is no definite morphologic, immunophenotypic and molecular evidence of myeloid neoplasm in the given sample. The possible etiologies for persistent cytopenias includes but not limited to infectious, autoimmune, medication induced or toxin related causes.   Here to discuss bone marrow biopsy that shows likely toxin chronic infection related.  Hypercellular bone marrow though no dysplasia, no definite morphologic, immunophenotypic and molecular evidence of myeloid neoplasm in the given sample. The possible etiologies for persistent cytopenias includes but not limited to infectious, autoimmune, medication induced or toxin related causes. He plans on decreasing alcohol intake slowly to eventually quit in the next few weeks.  Advised to discuss this with his primary care.  Encouraged him to quit drinking appropriately.  Additionally,  "recurrent urinary tract infections.  Anemia persists.  Discussed blood pressure readings patient states this is due to whitecoat syndrome and that his blood pressure is 120s/80s at home.    ID Statement:    PAMELA CANCINO is a 74 year old Male        Chief Complaint: \"I feel better\"   Interval History:      He presents today in WC with wife, Sole. He has quit drinking heavy liquor. He notes more energy and interest in doing things.  July he was admitted for small bowel obstruction related to the hernia incarceration. Plan for hernia repair.  In the process surgical clearance.  He has not needed Procrit injections the last 2 assessments.  Hemoglobin in the 11's.  Appetite good no unintentional weight loss.  Hydrating with fluid.   He does report that he had a respiratory virus last week and he is doing better this week.  He denies any headache, lymphadenopathy, fever, night sweats, shortness of breath, chest pain or palpitations, abdominal pain, constipation, diarrhea, nausea, vomiting, bloody stool or dark stool. Urinary issues are chronic and include dysuria, frequency and urgency.  Recently saw Nephrology who reports kidney function stable CKD. Nocturia continues. He has sleep apnea and is on a CPAP.  Patient denies any issues with appetite, or weight loss.     Imaging Studies:  US abdomen on 08/31/2023 shows calculi vs sludge in gallbladder, hepatomegaly with similar appearance of hepatic cyst in left lobe of liver. Previous CT abdomen 04/11/2014 shows fatty liver, cyst in left lobe, portal HTN, likely cirrhosis of the liver. Borderline splenomegaly. Repeat CT abdomen 07/22/2018 shows hepatic cysts in left lobe, suggest benign etiology. Renal US 05/29/2017 and 07/22/2020, both unremarkable. On 06/12/2019 Ct Pelvis shows umbilical hernia and no prostate abscess. US renal complete 1/17/2024 Essentially negative renal ultrasound.        Medical history:  -Hypertension  -Aortic valve stenosis  -Chronic " UTI  -A-fib on Xarelto  -Hearing loss sensorineural  -Dizziness and vertigo  -Erectile dysfunction  -GERD  -Idiopathic neuropathy  -Back pain  -Obesity  -Peptic ulcer hemorrhage  -Vitamin D3 def  -Vitamin B12  -Tinnitus   -Anemia as above  -Pancytopenia as above     Social History:  - lives with wife Sole and two children (boy and girl)   -retired  -Most recent alcohol intake was 4 weeks ago has decreased his heavy liquor.  -Denies smoking or suing tobacco   -Occasional Marijuana use  -No illicit drug uses      Family History:  -mom  52 years breast cancer  -dad  84 years vascular disease   -Niece leukemia  as a teenager  -Sister with hypertension  -Daughter immune disease  -Brother myocardial infarction      Review of Systems:   System Review-All other systems including Neurologic, Cardiac, Gastrointestinal, Endocrine, Dermatologic, ENT, Respiratory, Infectious, Urologic, Musculoskeletal  have been reviewed and are negative for complaint outside of events noted below and within the assessment.         Allergies and Intolerances:       Allergies   Allergen Reactions    Levofloxacin Angioedema and Hives      Outpatient Medication Profile:  Current Outpatient Medications   Medication Instructions    acetaminophen (Tylenol) 500 mg tablet Take 1-2 by mouth every 6 hours as needed for pain    allopurinol (ZYLOPRIM) 100 mg, oral, Daily    carvedilol (COREG) 25 mg, oral, 2 times daily (morning and late afternoon), Take with food.    cetirizine (ZYRTEC) 10 mg, Every evening    febuxostat (ULORIC) 40 mg, oral, Daily    folic acid (FOLVITE) 1 mg, oral, Daily    furosemide (LASIX) 40 mg, oral, Daily    hydrALAZINE (APRESOLINE) 50 mg, oral, 2 times daily    ketoconazole (NIZOral) 2 % cream APPLY TO AFFECTED AREA EVERY MORNING TILL CLEAR, REPEAT AS NEEDED.    levothyroxine (Synthroid, Levoxyl) 88 mcg tablet TAKE 1 TABLET BY MOUTH EVERY DAY ON EMPTY STOMACH    phenazopyridine (PYRIDIUM) 200 mg, 3  times daily (morning, midday, late afternoon)    predniSONE (Deltasone) 10 mg tablet if needed.    Ghazal-Chi Rx 1- mg-mg-mcg tablet 1 tablet, oral, Daily, Take with food.    tamsulosin (FLOMAX) 0.4 mg, oral, Daily    Xarelto 20 mg, oral, Daily with evening meal      Past Medical History:   Diagnosis Date    (HFpEF) heart failure with preserved ejection fraction (Multi)     Anemia     Aortic valve stenosis     S/P TAVR    Arthritis     Atrial fibrillation 10/10/2021    Benign prostatic hyperplasia with urinary retention     self caths 3-4 times daily    calcified aortic valve     Chronic UTI     CKD (chronic kidney disease)     Class 3 severe obesity with body mass index (BMI) of 40.0 to 44.9 in adult (Multi) 08/19/2024    42.46 kg/m²    Coronary artery disease     ETOH abuse     Fatty liver     GERD (gastroesophageal reflux disease)     GI (gastrointestinal bleed)     History of hemorrhagic diathesis     History of spinal stenosis     History of transfusion 2017    GI bleed   no reaction    HL (hearing loss)     Hyperlipidemia     Hypertension     hypothyroidism     Idiopathic neuropathy     Osteomyelitis (Multi)     Pancytopenia (Multi)     Pulmonary HTN (Multi)     SBO (small bowel obstruction) (Multi)     Septicemia (Multi)     Sleep apnea     Sleep apnea with use of continuous positive airway pressure (CPAP)     Thrombocytopenia concurrent with and due to alcoholism (Multi)     Umbilical hernia     Uses wheelchair     Vitamin B12 deficiency     Vitamin D deficiency     Wears hearing aid in both ears       Performance:   ECOG Performance Status: 2- Ambulatory 50% of waking  hours          Vitals and Measurements:       8/19/2024    10:37 AM 9/11/2024    12:05 PM 9/25/2024    11:00 AM 10/9/2024    11:33 AM 10/15/2024     1:42 PM 10/23/2024    11:26 AM 11/13/2024    10:30 AM   Vitals   Systolic 111 101 93 125 144 121 137   Diastolic 54 63 43 54 58 64 70   Heart Rate 61 56 56 39 54 55 60   Temp 36 °C (96.8 °F)  "36.2 °C (97.2 °F) 36.4 °C (97.5 °F) 36.4 °C (97.5 °F) 36.3 °C (97.3 °F) 36 °C (96.8 °F) 36.1 °C (97 °F)   Resp 20 17 16 16 16 16 17   Height (in) 1.88 m (6' 2\")    1.88 m (6' 2\")     Weight (lb) 330.69 328.82 329.81 331.13 320 327.05 325.51   BMI 42.46 kg/m2 42.22 kg/m2 42.34 kg/m2 42.51 kg/m2 41.09 kg/m2 41.99 kg/m2 41.79 kg/m2   BSA (m2) 2.8 m2 2.79 m2 2.8 m2 2.8 m2 2.75 m2 2.78 m2 2.78 m2   Visit Report     Report  Report      Physical Exam:      Constitutional: Improved complexion, awake/alert/oriented  x3, no distress, alert and cooperative   Eyes: clear scleras   ENMT: mucous membranes moist, no apparent injury, no lesions seen   Head/Neck: Neck supple, no apparent injury, thyroid  without mass or tenderness, No JVD, trachea midline, no bruits   Respiratory/Thorax: Patent airways, CTAB, diminished bases bilateral, normal  breath sounds with good chest expansion, thorax symmetric   Cardiovascular: arrhythmia at baseline, no murmurs, no swelling bilateral legs improved from prior visits   Gastrointestinal: Obese, soft, non-tender, no rebound tenderness or guarding, unable to palpate liver or spleen   Musculoskeletal: ROM intact, no joint swelling, normal  strength   Extremities: right leg brace, no cyanosis, no contusions or wounds, no clubbing, or  discoloration, venous insufficiency bilateral lower legs chronic   Neurological: alert and oriented x3, intact senses,  motor, response and reflexes, normal strength for baseline   Lymphatic: No significant cervical or subclavicular  lymphadenopathy   Psychological: Appropriate mood and behavior. Smiling.    Skin: Warm and dry, no lesions, no rashes      Lab Results:    Lab Results   Component Value Date    WBC 3.2 (L) 11/06/2024    NEUTROABS 1.69 11/06/2024    IGABSOL 0.00 11/06/2024    LYMPHSABS 0.90 11/06/2024    MONOSABS 0.30 11/06/2024    EOSABS 0.25 11/06/2024    BASOSABS 0.02 11/06/2024    RBC 3.52 (L) 11/06/2024     (H) 11/06/2024    MCHC 30.8 (L) " 11/06/2024    HGB 11.4 (L) 11/06/2024    HCT 37.0 (L) 11/06/2024     (L) 11/06/2024     Lab Results   Component Value Date    RETICCTPCT 3.3 (H) 02/29/2024      Lab Results   Component Value Date    CREATININE 1.78 (H) 10/23/2024    BUN 32 (H) 10/23/2024    EGFR 40 (L) 10/23/2024     (L) 10/23/2024    K 4.0 10/23/2024     10/23/2024    CO2 22 10/23/2024      Lab Results   Component Value Date    ALT 9 (L) 08/14/2024    AST 14 08/14/2024    ALKPHOS 94 08/14/2024    BILITOT 0.6 08/14/2024      Lab Results   Component Value Date    TSH 2.56 07/17/2024     Lab Results   Component Value Date    TSH 2.56 07/17/2024     Lab Results   Component Value Date    IRON 71 06/19/2024    TIBC 338 06/19/2024    FERRITIN 117 06/19/2024      Lab Results   Component Value Date    ZRTRQAHV26 1,671 (H) 11/30/2023      Lab Results   Component Value Date    FOLATE >24.0 11/30/2023     Lab Results   Component Value Date    SEDRATE 6 08/24/2023      Lab Results   Component Value Date    CRP CANCELED 08/24/2023     Lab Results   Component Value Date     02/29/2024     Lab Results   Component Value Date    HAPTOGLOBIN 124 11/30/2023     Lab Results   Component Value Date    SPEP Increase in polyclonal gamma globulins.   02/29/2024     Lab Results   Component Value Date    IGG 1,500 02/29/2024     02/29/2024     02/29/2024        Lab Results   Component Value Date    WBC 3.2 (L) 11/06/2024    NEUTROABS 1.69 11/06/2024    IGABSOL 0.00 11/06/2024    LYMPHSABS 0.90 11/06/2024    MONOSABS 0.30 11/06/2024    EOSABS 0.25 11/06/2024    BASOSABS 0.02 11/06/2024    RBC 3.52 (L) 11/06/2024     (H) 11/06/2024    MCHC 30.8 (L) 11/06/2024    HGB 11.4 (L) 11/06/2024    HCT 37.0 (L) 11/06/2024     (L) 11/06/2024     Lab Results   Component Value Date    RETICCTPCT 3.3 (H) 02/29/2024      Lab Results   Component Value Date    CREATININE 1.78 (H) 10/23/2024    BUN 32 (H) 10/23/2024    EGFR 40 (L) 10/23/2024      (L) 10/23/2024    K 4.0 10/23/2024     10/23/2024    CO2 22 10/23/2024      Lab Results   Component Value Date    ALT 9 (L) 08/14/2024    AST 14 08/14/2024    ALKPHOS 94 08/14/2024    BILITOT 0.6 08/14/2024      Lab Results   Component Value Date    TSH 2.56 07/17/2024     Lab Results   Component Value Date    TSH 2.56 07/17/2024     Lab Results   Component Value Date    IRON 71 06/19/2024    TIBC 338 06/19/2024    FERRITIN 117 06/19/2024      Lab Results   Component Value Date    ZUJZCSHQ12 1,671 (H) 11/30/2023      Lab Results   Component Value Date    FOLATE >24.0 11/30/2023     Lab Results   Component Value Date    SEDRATE 6 08/24/2023      Lab Results   Component Value Date    CRP CANCELED 08/24/2023        Lab Results   Component Value Date     02/29/2024     Lab Results   Component Value Date    HAPTOGLOBIN 124 11/30/2023     Lab Results   Component Value Date    SPEP Increase in polyclonal gamma globulins.   02/29/2024     Lab Results   Component Value Date    IGG 1,500 02/29/2024     02/29/2024     02/29/2024     Lab Results   Component Value Date    URICACID 8.0 (H) 05/02/2024         Assessment and Plan:   Pleasant 74-year-old male presents for pancytopenia follow-up.  Discussed likely contributors below along with not being able to R/O MDS. Also discussed daily alcohol use and chronic urinary tract infections as playing a role in his pancytopenia.  Pathologist review of CBCd reveal macrocytic anemia. Supplements of vital vitamin nutrients advised and prescribed.  Discussed the nutritional deficiency, liver disease and alcohol intake as likely causation. His MCV is 119, more deficient from previous value of 107-119 over the last year. WBC 4.8, Hgb 9 and dropping while taking supplements. RBC 2.49 and WBC 4.0, platelets are 85. His daily alcohol consumption is of concern for pancytopenia. He states understanding.  Concern for bleeding, he denies any signs of bleeding.   He is in agreement for bone marrow biopsy.  Iron panel stable.  Ferritin normal range.  Likely anemia of chronic disease versus bleeding, as additional differential.     Bone Marrow Biopsy  FINAL DIAGNOSIS 6/6/2024   A-C. BONE MARROW CLOT, CORE BIOPSY, AND ASPIRATE SMEAR, RIGHT ILIAC CREST:   -- SLIGHTLY HYPERCELLULAR BONE MARROW FOR AGE (50-60%) WITH MATURING TRILINEAGE HEMATOPOIESIS.   There is no definite morphologic, immunophenotypic and molecular evidence of myeloid neoplasm in the given sample. The possible etiologies for persistent cytopenias includes but not limited to infectious, autoimmune, medication induced or toxin related causes.     Patient's hemoglobin is in the 9-10 range.  Tolerating Epogen injections.  Patient states blood pressure is managed at home and he has whitecoat syndrome while in the office.  Recent changes to his blood pressure medication.    Additional Observations, history, and referrals:  He is wheel chair bound other than pivot and transfer independently, increased weakness. His physical activity is limited by physical pain back and joints. Obesity noted. He has followed inpatient cardiology 09/2021 and 11/2022 for severe aortic stenosis and a-flutter. Follow cardiology yearly for ECHO and assessment. He is on Xarelto and ASA. Followed GI 08/31/2017 for colonoscopy for LLQ pain. Redundant colon noted. No specimens taken. Repeat five years recommended.   Patient could have IBS secondary to alcohol abuse.  Patient originally followed with GI/hepatology.  Who did not feel liver biopsy was necessary as obvious evidence of disease not noted.  He will avoid a FibroScan because he thinks it will have a false positive given alcohol use.  Her Dr. Alcides Ohara's note dated 11/30/2023.    8/14/2024 patient improved since Procrit injections.  He will receive today as his hemoglobin is 10.4.  Platelets are stable.  No bleeding or bruising.    11/13/2024 patient overall appears to be doing much  better.  Hemoglobin A1c obtained taking the last 2 assessments lipids.  We will see at the next assessment if he receives Procrit.  If not needed, we will decrease injection times to every 21 days.  He and wife in agreement.  He will have labs drawn next week to assess B12 and folate.  He continues to take the supplements.  He will follow in 4 months.    I have reviewed the patient's MHR of all notes, labs, imaging, procedures to assist in evaluation of health. Some of his health records were obtained outside resources [facility].    Follow-up:  RTC:  -4 months with labs  -Biweekly Procrit ordered at 20, 000 units with standard protocol and administration guidelines per treatment plan    Medications:  -NA    Imaging:  -NA     Referral/Additional appointments:   -Cardiology 2/7/2025  -Nephrology 4/22/2025     Discussed plan of care with patient and his wife. Patient states understanding and agreeable to plan of care.  Patient will call with any questions or concerns.        Thank you allowing me to care for you today.    Sincerely,  Vickie Srivastava, APRN-CNP     This note has been written with voice recognition software if there is need for clarity please reach out.

## 2024-11-20 ENCOUNTER — INFUSION (OUTPATIENT)
Dept: HEMATOLOGY/ONCOLOGY | Facility: CLINIC | Age: 74
End: 2024-11-20
Payer: MEDICARE

## 2024-11-20 VITALS
WEIGHT: 315 LBS | RESPIRATION RATE: 17 BRPM | BODY MASS INDEX: 41.79 KG/M2 | SYSTOLIC BLOOD PRESSURE: 116 MMHG | HEART RATE: 55 BPM | DIASTOLIC BLOOD PRESSURE: 67 MMHG | TEMPERATURE: 96.8 F | OXYGEN SATURATION: 93 %

## 2024-11-20 DIAGNOSIS — D63.1 ANEMIA DUE TO STAGE 4 CHRONIC KIDNEY DISEASE (MULTI): ICD-10-CM

## 2024-11-20 DIAGNOSIS — N18.32 CHRONIC KIDNEY DISEASE, STAGE 3B (MULTI): ICD-10-CM

## 2024-11-20 DIAGNOSIS — N18.4 ANEMIA DUE TO STAGE 4 CHRONIC KIDNEY DISEASE (MULTI): ICD-10-CM

## 2024-11-20 DIAGNOSIS — D61.818 PANCYTOPENIA: ICD-10-CM

## 2024-11-20 DIAGNOSIS — M10.00 IDIOPATHIC GOUT, UNSPECIFIED CHRONICITY, UNSPECIFIED SITE: ICD-10-CM

## 2024-11-20 DIAGNOSIS — R10.84 GENERALIZED ABDOMINAL PAIN: ICD-10-CM

## 2024-11-20 DIAGNOSIS — I10 ESSENTIAL (PRIMARY) HYPERTENSION: ICD-10-CM

## 2024-11-20 DIAGNOSIS — I12.9 HYPERTENSIVE CHRONIC KIDNEY DISEASE WITH STAGE 1 THROUGH STAGE 4 CHRONIC KIDNEY DISEASE, OR UNSPECIFIED CHRONIC KIDNEY DISEASE: ICD-10-CM

## 2024-11-20 DIAGNOSIS — D50.9 IRON DEFICIENCY ANEMIA, UNSPECIFIED IRON DEFICIENCY ANEMIA TYPE: ICD-10-CM

## 2024-11-20 DIAGNOSIS — N28.9 KIDNEY DISEASE: ICD-10-CM

## 2024-11-20 LAB
ALBUMIN SERPL BCP-MCNC: 3.8 G/DL (ref 3.4–5)
ALP SERPL-CCNC: 67 U/L (ref 33–136)
ALT SERPL W P-5'-P-CCNC: 8 U/L (ref 10–52)
ANION GAP SERPL CALC-SCNC: 14 MMOL/L (ref 10–20)
AST SERPL W P-5'-P-CCNC: 16 U/L (ref 9–39)
BASOPHILS # BLD AUTO: 0.02 X10*3/UL (ref 0–0.1)
BASOPHILS NFR BLD AUTO: 0.4 %
BILIRUB SERPL-MCNC: 0.5 MG/DL (ref 0–1.2)
BUN SERPL-MCNC: 29 MG/DL (ref 6–23)
CALCIUM SERPL-MCNC: 9.1 MG/DL (ref 8.6–10.3)
CHLORIDE SERPL-SCNC: 101 MMOL/L (ref 98–107)
CO2 SERPL-SCNC: 23 MMOL/L (ref 21–32)
CREAT SERPL-MCNC: 1.87 MG/DL (ref 0.5–1.3)
EGFRCR SERPLBLD CKD-EPI 2021: 37 ML/MIN/1.73M*2
EOSINOPHIL # BLD AUTO: 0.3 X10*3/UL (ref 0–0.4)
EOSINOPHIL NFR BLD AUTO: 5.8 %
ERYTHROCYTE [DISTWIDTH] IN BLOOD BY AUTOMATED COUNT: 14.3 % (ref 11.5–14.5)
FOLATE SERPL-MCNC: >24 NG/ML
GLUCOSE SERPL-MCNC: 94 MG/DL (ref 74–99)
HCT VFR BLD AUTO: 39.8 % (ref 41–52)
HGB BLD-MCNC: 12.1 G/DL (ref 13.5–17.5)
IMM GRANULOCYTES # BLD AUTO: 0.01 X10*3/UL (ref 0–0.5)
IMM GRANULOCYTES NFR BLD AUTO: 0.2 % (ref 0–0.9)
LYMPHOCYTES # BLD AUTO: 1.29 X10*3/UL (ref 0.8–3)
LYMPHOCYTES NFR BLD AUTO: 25 %
MCH RBC QN AUTO: 32.5 PG (ref 26–34)
MCHC RBC AUTO-ENTMCNC: 30.4 G/DL (ref 32–36)
MCV RBC AUTO: 107 FL (ref 80–100)
MONOCYTES # BLD AUTO: 0.37 X10*3/UL (ref 0.05–0.8)
MONOCYTES NFR BLD AUTO: 7.2 %
NEUTROPHILS # BLD AUTO: 3.18 X10*3/UL (ref 1.6–5.5)
NEUTROPHILS NFR BLD AUTO: 61.4 %
NRBC BLD-RTO: ABNORMAL /100{WBCS}
PLATELET # BLD AUTO: 97 X10*3/UL (ref 150–450)
POTASSIUM SERPL-SCNC: 4 MMOL/L (ref 3.5–5.3)
PROT SERPL-MCNC: 7.1 G/DL (ref 6.4–8.2)
RBC # BLD AUTO: 3.72 X10*6/UL (ref 4.5–5.9)
RBC MORPH BLD: NORMAL
SODIUM SERPL-SCNC: 134 MMOL/L (ref 136–145)
VIT B12 SERPL-MCNC: 538 PG/ML (ref 211–911)
WBC # BLD AUTO: 5.2 X10*3/UL (ref 4.4–11.3)

## 2024-11-20 PROCEDURE — 36415 COLL VENOUS BLD VENIPUNCTURE: CPT

## 2024-11-20 PROCEDURE — 84075 ASSAY ALKALINE PHOSPHATASE: CPT

## 2024-11-20 PROCEDURE — 82525 ASSAY OF COPPER: CPT

## 2024-11-20 PROCEDURE — 85025 COMPLETE CBC W/AUTO DIFF WBC: CPT

## 2024-11-20 PROCEDURE — 82607 VITAMIN B-12: CPT | Mod: GEALAB

## 2024-11-20 PROCEDURE — 82746 ASSAY OF FOLIC ACID SERUM: CPT | Mod: GEALAB

## 2024-11-20 ASSESSMENT — PAIN SCALES - GENERAL: PAINLEVEL_OUTOF10: 0-NO PAIN

## 2024-11-20 NOTE — PROGRESS NOTES
Hgb is 12.1 today so no treatment indicated. Labs reviewed w Pt / SO who verbalize understanding. Schedule reviewed then Dc'd via WC.

## 2024-11-22 LAB — COPPER SERPL-MCNC: 105.8 UG/DL (ref 70–140)

## 2024-11-27 DIAGNOSIS — N18.4 ANEMIA DUE TO STAGE 4 CHRONIC KIDNEY DISEASE (MULTI): Primary | ICD-10-CM

## 2024-11-27 DIAGNOSIS — D63.1 ANEMIA DUE TO STAGE 4 CHRONIC KIDNEY DISEASE (MULTI): Primary | ICD-10-CM

## 2024-11-27 RX ORDER — EPINEPHRINE 0.3 MG/.3ML
0.3 INJECTION SUBCUTANEOUS EVERY 5 MIN PRN
OUTPATIENT
Start: 2024-11-27

## 2024-11-27 RX ORDER — ALBUTEROL SULFATE 0.83 MG/ML
3 SOLUTION RESPIRATORY (INHALATION) AS NEEDED
OUTPATIENT
Start: 2024-11-27

## 2024-11-27 RX ORDER — DIPHENHYDRAMINE HYDROCHLORIDE 50 MG/ML
50 INJECTION INTRAMUSCULAR; INTRAVENOUS AS NEEDED
OUTPATIENT
Start: 2024-11-27

## 2024-11-27 RX ORDER — FAMOTIDINE 10 MG/ML
20 INJECTION INTRAVENOUS ONCE AS NEEDED
OUTPATIENT
Start: 2024-11-27

## 2024-12-04 ENCOUNTER — APPOINTMENT (OUTPATIENT)
Dept: HEMATOLOGY/ONCOLOGY | Facility: CLINIC | Age: 74
End: 2024-12-04
Payer: MEDICARE

## 2024-12-18 ENCOUNTER — APPOINTMENT (OUTPATIENT)
Dept: HEMATOLOGY/ONCOLOGY | Facility: CLINIC | Age: 74
End: 2024-12-18
Payer: MEDICARE

## 2024-12-26 ENCOUNTER — APPOINTMENT (OUTPATIENT)
Dept: HEMATOLOGY/ONCOLOGY | Facility: CLINIC | Age: 74
End: 2024-12-26
Payer: MEDICARE

## 2025-01-02 ENCOUNTER — APPOINTMENT (OUTPATIENT)
Dept: HEMATOLOGY/ONCOLOGY | Facility: CLINIC | Age: 75
End: 2025-01-02
Payer: MEDICARE

## 2025-01-15 ENCOUNTER — INFUSION (OUTPATIENT)
Dept: HEMATOLOGY/ONCOLOGY | Facility: CLINIC | Age: 75
End: 2025-01-15
Payer: MEDICARE

## 2025-01-15 VITALS
OXYGEN SATURATION: 92 % | BODY MASS INDEX: 43.22 KG/M2 | SYSTOLIC BLOOD PRESSURE: 152 MMHG | RESPIRATION RATE: 18 BRPM | WEIGHT: 315 LBS | DIASTOLIC BLOOD PRESSURE: 53 MMHG | TEMPERATURE: 96.8 F | HEART RATE: 77 BPM

## 2025-01-15 DIAGNOSIS — E03.8 OTHER SPECIFIED HYPOTHYROIDISM: ICD-10-CM

## 2025-01-15 DIAGNOSIS — D63.1 ANEMIA DUE TO STAGE 4 CHRONIC KIDNEY DISEASE (MULTI): ICD-10-CM

## 2025-01-15 DIAGNOSIS — N18.4 ANEMIA DUE TO STAGE 4 CHRONIC KIDNEY DISEASE (MULTI): ICD-10-CM

## 2025-01-15 LAB
BASOPHILS # BLD AUTO: 0.02 X10*3/UL (ref 0–0.1)
BASOPHILS NFR BLD AUTO: 0.5 %
EOSINOPHIL # BLD AUTO: 0.34 X10*3/UL (ref 0–0.4)
EOSINOPHIL NFR BLD AUTO: 7.9 %
ERYTHROCYTE [DISTWIDTH] IN BLOOD BY AUTOMATED COUNT: 13.5 % (ref 11.5–14.5)
HCT VFR BLD AUTO: 39.8 % (ref 41–52)
HGB BLD-MCNC: 12.4 G/DL (ref 13.5–17.5)
IMM GRANULOCYTES # BLD AUTO: 0.01 X10*3/UL (ref 0–0.5)
IMM GRANULOCYTES NFR BLD AUTO: 0.2 % (ref 0–0.9)
LYMPHOCYTES # BLD AUTO: 0.91 X10*3/UL (ref 0.8–3)
LYMPHOCYTES NFR BLD AUTO: 21 %
MCH RBC QN AUTO: 32.5 PG (ref 26–34)
MCHC RBC AUTO-ENTMCNC: 31.2 G/DL (ref 32–36)
MCV RBC AUTO: 105 FL (ref 80–100)
MONOCYTES # BLD AUTO: 0.32 X10*3/UL (ref 0.05–0.8)
MONOCYTES NFR BLD AUTO: 7.4 %
NEUTROPHILS # BLD AUTO: 2.73 X10*3/UL (ref 1.6–5.5)
NEUTROPHILS NFR BLD AUTO: 63 %
PLATELET # BLD AUTO: 92 X10*3/UL (ref 150–450)
PMV BLD AUTO: 8.7 FL (ref 7.5–11.5)
RBC # BLD AUTO: 3.81 X10*6/UL (ref 4.5–5.9)
WBC # BLD AUTO: 4.3 X10*3/UL (ref 4.4–11.3)

## 2025-01-15 PROCEDURE — 85025 COMPLETE CBC W/AUTO DIFF WBC: CPT

## 2025-01-15 RX ORDER — ALBUTEROL SULFATE 0.83 MG/ML
3 SOLUTION RESPIRATORY (INHALATION) AS NEEDED
OUTPATIENT
Start: 2025-02-05

## 2025-01-15 RX ORDER — EPINEPHRINE 0.3 MG/.3ML
0.3 INJECTION SUBCUTANEOUS EVERY 5 MIN PRN
OUTPATIENT
Start: 2025-02-05

## 2025-01-15 RX ORDER — LEVOTHYROXINE SODIUM 88 UG/1
TABLET ORAL
Qty: 90 TABLET | Refills: 1 | Status: SHIPPED | OUTPATIENT
Start: 2025-01-15

## 2025-01-15 RX ORDER — DIPHENHYDRAMINE HYDROCHLORIDE 50 MG/ML
50 INJECTION INTRAMUSCULAR; INTRAVENOUS AS NEEDED
OUTPATIENT
Start: 2025-02-05

## 2025-01-15 RX ORDER — FAMOTIDINE 10 MG/ML
20 INJECTION INTRAVENOUS ONCE AS NEEDED
OUTPATIENT
Start: 2025-02-05

## 2025-01-15 ASSESSMENT — PAIN SCALES - GENERAL: PAINLEVEL_OUTOF10: 0-NO PAIN

## 2025-01-15 NOTE — PROGRESS NOTES
Patient did not meet parameter for Epo injection with a hemoglobin of 12.4. Schedule and lab results were reviewed with patient and he verbalized understanding.

## 2025-01-29 ENCOUNTER — APPOINTMENT (OUTPATIENT)
Dept: HEMATOLOGY/ONCOLOGY | Facility: CLINIC | Age: 75
End: 2025-01-29
Payer: MEDICARE

## 2025-02-05 ENCOUNTER — INFUSION (OUTPATIENT)
Dept: HEMATOLOGY/ONCOLOGY | Facility: CLINIC | Age: 75
End: 2025-02-05
Payer: MEDICARE

## 2025-02-05 VITALS
DIASTOLIC BLOOD PRESSURE: 69 MMHG | HEART RATE: 55 BPM | RESPIRATION RATE: 18 BRPM | TEMPERATURE: 97.5 F | SYSTOLIC BLOOD PRESSURE: 153 MMHG | OXYGEN SATURATION: 92 % | WEIGHT: 315 LBS | BODY MASS INDEX: 43.75 KG/M2

## 2025-02-05 DIAGNOSIS — N18.4 ANEMIA DUE TO STAGE 4 CHRONIC KIDNEY DISEASE (MULTI): ICD-10-CM

## 2025-02-05 DIAGNOSIS — D63.1 ANEMIA DUE TO STAGE 4 CHRONIC KIDNEY DISEASE (MULTI): ICD-10-CM

## 2025-02-05 LAB
BASOPHILS # BLD AUTO: 0.02 X10*3/UL (ref 0–0.1)
BASOPHILS NFR BLD AUTO: 0.5 %
EOSINOPHIL # BLD AUTO: 0.21 X10*3/UL (ref 0–0.4)
EOSINOPHIL NFR BLD AUTO: 4.9 %
ERYTHROCYTE [DISTWIDTH] IN BLOOD BY AUTOMATED COUNT: 13.9 % (ref 11.5–14.5)
FERRITIN SERPL-MCNC: 61 NG/ML (ref 20–300)
HCT VFR BLD AUTO: 38.3 % (ref 41–52)
HGB BLD-MCNC: 11.8 G/DL (ref 13.5–17.5)
IMM GRANULOCYTES # BLD AUTO: 0 X10*3/UL (ref 0–0.5)
IMM GRANULOCYTES NFR BLD AUTO: 0 % (ref 0–0.9)
IRON SATN MFR SERPL: 28 % (ref 25–45)
IRON SERPL-MCNC: 98 UG/DL (ref 35–150)
LYMPHOCYTES # BLD AUTO: 0.95 X10*3/UL (ref 0.8–3)
LYMPHOCYTES NFR BLD AUTO: 22.1 %
MCH RBC QN AUTO: 32.4 PG (ref 26–34)
MCHC RBC AUTO-ENTMCNC: 30.8 G/DL (ref 32–36)
MCV RBC AUTO: 105 FL (ref 80–100)
MONOCYTES # BLD AUTO: 0.29 X10*3/UL (ref 0.05–0.8)
MONOCYTES NFR BLD AUTO: 6.8 %
NEUTROPHILS # BLD AUTO: 2.82 X10*3/UL (ref 1.6–5.5)
NEUTROPHILS NFR BLD AUTO: 65.7 %
PLATELET # BLD AUTO: 103 X10*3/UL (ref 150–450)
RBC # BLD AUTO: 3.64 X10*6/UL (ref 4.5–5.9)
TIBC SERPL-MCNC: 348 UG/DL (ref 240–445)
UIBC SERPL-MCNC: 250 UG/DL (ref 110–370)
WBC # BLD AUTO: 4.3 X10*3/UL (ref 4.4–11.3)

## 2025-02-05 PROCEDURE — 82728 ASSAY OF FERRITIN: CPT

## 2025-02-05 PROCEDURE — 83540 ASSAY OF IRON: CPT

## 2025-02-05 PROCEDURE — 36415 COLL VENOUS BLD VENIPUNCTURE: CPT

## 2025-02-05 PROCEDURE — 85025 COMPLETE CBC W/AUTO DIFF WBC: CPT

## 2025-02-05 ASSESSMENT — PAIN SCALES - GENERAL: PAINLEVEL_OUTOF10: 0-NO PAIN

## 2025-02-05 NOTE — PROGRESS NOTES
Aly Mccarty arrived to [facility] for possible Epo injection.  Hgb 11.8 injection will be held.  Aly Mccarty denies further questions/concerns/comments and agrees to POC going forward.

## 2025-02-07 ENCOUNTER — APPOINTMENT (OUTPATIENT)
Dept: CARDIOLOGY | Facility: HOSPITAL | Age: 75
End: 2025-02-07
Payer: MEDICARE

## 2025-02-19 ENCOUNTER — APPOINTMENT (OUTPATIENT)
Dept: HEMATOLOGY/ONCOLOGY | Facility: CLINIC | Age: 75
End: 2025-02-19
Payer: MEDICARE

## 2025-02-26 ENCOUNTER — INFUSION (OUTPATIENT)
Dept: HEMATOLOGY/ONCOLOGY | Facility: CLINIC | Age: 75
End: 2025-02-26
Payer: MEDICARE

## 2025-02-26 VITALS
OXYGEN SATURATION: 92 % | BODY MASS INDEX: 43.96 KG/M2 | RESPIRATION RATE: 18 BRPM | SYSTOLIC BLOOD PRESSURE: 156 MMHG | WEIGHT: 315 LBS | TEMPERATURE: 96.4 F | DIASTOLIC BLOOD PRESSURE: 70 MMHG | HEART RATE: 70 BPM

## 2025-02-26 DIAGNOSIS — N18.4 ANEMIA DUE TO STAGE 4 CHRONIC KIDNEY DISEASE (MULTI): ICD-10-CM

## 2025-02-26 DIAGNOSIS — D63.1 ANEMIA DUE TO STAGE 4 CHRONIC KIDNEY DISEASE (MULTI): ICD-10-CM

## 2025-02-26 LAB
BASOPHILS # BLD AUTO: 0.02 X10*3/UL (ref 0–0.1)
BASOPHILS NFR BLD AUTO: 0.5 %
EOSINOPHIL # BLD AUTO: 0.31 X10*3/UL (ref 0–0.4)
EOSINOPHIL NFR BLD AUTO: 7.4 %
ERYTHROCYTE [DISTWIDTH] IN BLOOD BY AUTOMATED COUNT: 13.7 % (ref 11.5–14.5)
HCT VFR BLD AUTO: 39.4 % (ref 41–52)
HGB BLD-MCNC: 12.5 G/DL (ref 13.5–17.5)
IMM GRANULOCYTES # BLD AUTO: 0.01 X10*3/UL (ref 0–0.5)
IMM GRANULOCYTES NFR BLD AUTO: 0.2 % (ref 0–0.9)
LYMPHOCYTES # BLD AUTO: 0.96 X10*3/UL (ref 0.8–3)
LYMPHOCYTES NFR BLD AUTO: 22.9 %
MCH RBC QN AUTO: 33.1 PG (ref 26–34)
MCHC RBC AUTO-ENTMCNC: 31.7 G/DL (ref 32–36)
MCV RBC AUTO: 104 FL (ref 80–100)
MONOCYTES # BLD AUTO: 0.28 X10*3/UL (ref 0.05–0.8)
MONOCYTES NFR BLD AUTO: 6.7 %
NEUTROPHILS # BLD AUTO: 2.62 X10*3/UL (ref 1.6–5.5)
NEUTROPHILS NFR BLD AUTO: 62.3 %
PLATELET # BLD AUTO: 89 X10*3/UL (ref 150–450)
RBC # BLD AUTO: 3.78 X10*6/UL (ref 4.5–5.9)
WBC # BLD AUTO: 4.2 X10*3/UL (ref 4.4–11.3)

## 2025-02-26 PROCEDURE — 85025 COMPLETE CBC W/AUTO DIFF WBC: CPT

## 2025-02-26 PROCEDURE — 36415 COLL VENOUS BLD VENIPUNCTURE: CPT

## 2025-02-26 ASSESSMENT — PAIN SCALES - GENERAL: PAINLEVEL_OUTOF10: 0-NO PAIN

## 2025-02-26 NOTE — PROGRESS NOTES
Patient with H/H 12.5/39.4 and no need for Epo injection for today. Has all appointments to RTC. Wife and patient aware of results.

## 2025-02-28 ENCOUNTER — OFFICE VISIT (OUTPATIENT)
Dept: CARDIOLOGY | Facility: HOSPITAL | Age: 75
End: 2025-02-28
Payer: MEDICARE

## 2025-02-28 VITALS
WEIGHT: 315 LBS | BODY MASS INDEX: 44.07 KG/M2 | DIASTOLIC BLOOD PRESSURE: 66 MMHG | SYSTOLIC BLOOD PRESSURE: 110 MMHG | OXYGEN SATURATION: 92 % | HEART RATE: 79 BPM

## 2025-02-28 DIAGNOSIS — Z95.2 S/P TAVR (TRANSCATHETER AORTIC VALVE REPLACEMENT): ICD-10-CM

## 2025-02-28 DIAGNOSIS — I48.21 PERMANENT ATRIAL FIBRILLATION (MULTI): ICD-10-CM

## 2025-02-28 DIAGNOSIS — I50.32 CHRONIC DIASTOLIC CONGESTIVE HEART FAILURE: Primary | ICD-10-CM

## 2025-02-28 DIAGNOSIS — I10 ESSENTIAL (PRIMARY) HYPERTENSION: ICD-10-CM

## 2025-02-28 PROCEDURE — 1159F MED LIST DOCD IN RCRD: CPT | Performed by: NURSE PRACTITIONER

## 2025-02-28 PROCEDURE — 99214 OFFICE O/P EST MOD 30 MIN: CPT | Performed by: NURSE PRACTITIONER

## 2025-02-28 PROCEDURE — G2211 COMPLEX E/M VISIT ADD ON: HCPCS | Performed by: NURSE PRACTITIONER

## 2025-02-28 PROCEDURE — 1036F TOBACCO NON-USER: CPT | Performed by: NURSE PRACTITIONER

## 2025-02-28 PROCEDURE — 1157F ADVNC CARE PLAN IN RCRD: CPT | Performed by: NURSE PRACTITIONER

## 2025-02-28 PROCEDURE — 3074F SYST BP LT 130 MM HG: CPT | Performed by: NURSE PRACTITIONER

## 2025-02-28 PROCEDURE — 3078F DIAST BP <80 MM HG: CPT | Performed by: NURSE PRACTITIONER

## 2025-02-28 ASSESSMENT — ENCOUNTER SYMPTOMS
NEUROLOGICAL NEGATIVE: 1
EYES NEGATIVE: 1
HEMATOLOGIC/LYMPHATIC NEGATIVE: 1
ENDOCRINE NEGATIVE: 1
MYALGIAS: 1
RESPIRATORY NEGATIVE: 1
CONSTITUTIONAL NEGATIVE: 1
GASTROINTESTINAL NEGATIVE: 1
PSYCHIATRIC NEGATIVE: 1

## 2025-02-28 NOTE — PROGRESS NOTES
Referred by Dr. Cantor ref. provider found for No chief complaint on file.     History Of Present Illness:   Mr. Hsu is a very pleasant 74 year old gentleman with a history of atrial fibrillation (Xarelto), HFpEF (LVEF 55-60%), HTN, HLD and aortic stenosis s/p TAVR, he is here for a follow up visit. The patient is seen in collaboration with Dr. Hensley. Mr. Mccarty dizziness has improved. Swelling has resolved. Feeling good overall. Denies chest pain or shortness of breath.     Review of Systems   Constitutional: Negative.   HENT: Negative.     Eyes: Negative.    Respiratory: Negative.     Endocrine: Negative.    Hematologic/Lymphatic: Negative.    Skin: Negative.    Musculoskeletal:  Positive for muscle weakness and myalgias.   Gastrointestinal: Negative.    Neurological: Negative.    Psychiatric/Behavioral: Negative.          Past Medical History:  He has a past medical history of (HFpEF) heart failure with preserved ejection fraction, Anemia, Aortic valve stenosis, Arthritis, Atrial fibrillation (10/10/2021), Benign prostatic hyperplasia with urinary retention, calcified aortic valve, Chronic UTI, CKD (chronic kidney disease), Class 3 severe obesity with body mass index (BMI) of 40.0 to 44.9 in adult (08/19/2024), Coronary artery disease, ETOH abuse, Fatty liver, GERD (gastroesophageal reflux disease), GI (gastrointestinal bleed), History of hemorrhagic diathesis, History of spinal stenosis, History of transfusion (2017), HL (hearing loss), Hyperlipidemia, Hypertension, hypothyroidism, Idiopathic neuropathy, Osteomyelitis, Pancytopenia, Pulmonary HTN (Multi), SBO (small bowel obstruction) (Multi), Septicemia (Multi), Sleep apnea, Sleep apnea with use of continuous positive airway pressure (CPAP), Thrombocytopenia concurrent with and due to alcoholism (Multi), Umbilical hernia, Urinary tract infection (2018), Uses wheelchair, Vitamin B12 deficiency, Vitamin D deficiency, and Wears hearing aid in both  ears.    Past Surgical History:  He has a past surgical history that includes picc line insertion wo imaging (08/08/2019); Leg Surgery (08/08/2019); Other surgical history (07/07/2021); Abdominal surgery; Eye surgery; Fracture surgery; Cardiac surgery; Tonsillectomy; Vascular surgery; Aortic valve replacement; Colonoscopy; Upper gastrointestinal endoscopy; and Vasectomy (1984).      Social History:  He reports that he has never smoked. He has never used smokeless tobacco. He reports that he does not currently use alcohol after a past usage of about 21.0 standard drinks of alcohol per week. He reports current drug use. Drug: Marijuana.    Family History:  Family History   Problem Relation Name Age of Onset    Hypertension Mother Yanira     Breast cancer Mother Yanira     Kidney disease Father Aly CARRILLO         CKD    Peripheral vascular disease Father Aly CARRILLO     Cancer Sibling          Allergies:  Levofloxacin    Outpatient Medications:  Current Outpatient Medications   Medication Instructions    acetaminophen (Tylenol) 500 mg tablet Take 1-2 by mouth every 6 hours as needed for pain    allopurinol (ZYLOPRIM) 100 mg, oral, Daily    carvedilol (COREG) 25 mg, oral, 2 times daily (morning and late afternoon), Take with food.    cetirizine (ZYRTEC) 10 mg, Every evening    febuxostat (ULORIC) 40 mg, oral, Daily    folic acid (FOLVITE) 1 mg, oral, Daily    furosemide (LASIX) 40 mg, oral, Daily    hydrALAZINE (APRESOLINE) 50 mg, oral, 2 times daily    ketoconazole (NIZOral) 2 % cream APPLY TO AFFECTED AREA EVERY MORNING TILL CLEAR, REPEAT AS NEEDED.    levothyroxine (Synthroid, Levoxyl) 88 mcg tablet TAKE 1 TABLET BY MOUTH EVERY DAY ON EMPTY STOMACH    phenazopyridine (PYRIDIUM) 200 mg, 3 times daily (morning, midday, late afternoon)    predniSONE (Deltasone) 10 mg tablet if needed.    Ghazal-Chi Rx 1- mg-mg-mcg tablet 1 tablet, oral, Daily, Take with food.    tamsulosin (FLOMAX) 0.4 mg, oral, Daily    Xarelto 20 mg,  oral, Daily with evening meal        Last Recorded Vitals:  Vitals:    02/28/25 1045   BP: 110/66   Pulse: 79   SpO2: 92%   Weight: (!) 156 kg (343 lb 4.1 oz)         Physical Exam:  Physical Exam  Vitals reviewed.   HENT:      Head: Normocephalic.      Nose: Nose normal.   Eyes:      Pupils: Pupils are equal, round, and reactive to light.   Cardiovascular:      Rate and Rhythm: Irregularly Irregular   Pulmonary:      Effort: Pulmonary effort is normal.      Breath sounds: Normal breath sounds.   Abdominal:      General: Abdomen is flat.      Palpations: Abdomen is soft.   Musculoskeletal:         General: Normal range of motion.      Cervical back: Normal range of motion.   Skin:     General: Skin is warm and dry.   Neurological:      General: No focal deficit present.      Mental Status: He is alert and oriented to person, place, and time.   Psychiatric:         Mood and Affect: Mood normal.            Last Labs:  CBC -  Lab Results   Component Value Date    WBC 4.2 (L) 02/26/2025    HGB 12.5 (L) 02/26/2025    HCT 39.4 (L) 02/26/2025     (H) 02/26/2025    PLT 89 (L) 02/26/2025       CMP -  Lab Results   Component Value Date    CALCIUM 9.1 11/20/2024    PHOS 3.7 10/23/2024    PROT 7.1 11/20/2024    ALBUMIN 3.8 11/20/2024    AST 16 11/20/2024    ALT 8 (L) 11/20/2024    ALKPHOS 67 11/20/2024    BILITOT 0.5 11/20/2024       LIPID PANEL -   Lab Results   Component Value Date    CHOL 181 07/12/2023    TRIG 119 07/12/2023    HDL 47.4 07/12/2023    CHHDL 3.8 07/12/2023    LDLF 110 (H) 07/12/2023    VLDL 24 07/12/2023       RENAL FUNCTION PANEL -   Lab Results   Component Value Date    GLUCOSE 94 11/20/2024     (L) 11/20/2024    K 4.0 11/20/2024     11/20/2024    CO2 23 11/20/2024    ANIONGAP 14 11/20/2024    BUN 29 (H) 11/20/2024    CREATININE 1.87 (H) 11/20/2024    GFRMALE 37 (A) 08/24/2023    CALCIUM 9.1 11/20/2024    PHOS 3.7 10/23/2024    ALBUMIN 3.8 11/20/2024        Lab Results   Component Value  Date     (H) 07/01/2024    HGBA1C 4.8 07/12/2023       Last Cardiology Tests:  ECG:    Echo:  Echocardiogram 8/21/2024   1. The left ventricular systolic function is normal, with a visually estimated ejection fraction of 55-60%.   2. Spectral Doppler shows an impaired relaxation pattern of left ventricular diastolic filling.   3. There is normal right ventricular global systolic function.   4. The left atrium is severely dilated.   5. The right atrium is severely dilated.   6. There is mild mitral valve stenosis.   7. There is a transcatheter aortic valve replacement. The peak instantaneous gradient of the aortic valve is 28.7 mmHg. The mean gradient of the aortic valve is 17.0 mmHg.   8. There is mild tricuspid regurgitation.   9. The estimated pulmonary artery pressure is mildly elevated with the RVSP at 47.2 mmHg.     Echocardiogram 12/16/2022  1. Left ventricular systolic function is normal with a 60-65% estimated ejection fraction.   2. Spectral Doppler shows an abnormal pattern of left ventricular diastolic filling.   3. The left atrium is moderate to severely dilated.   4. Aortic valve appears abnormal.   5. There is a transcatheter aortic valve replacement, functioning normally with trivial aortic regurgitation.   6. Mild aortic valve regurgitation.   7. Poorly visualized anatomical structures due to suboptimal image quality.     Echocardiogram 11/22/022  1. Left ventricular systolic function is normal with a 55-60% estimated ejection fraction.   2. Post TAVR - no pericardial effusion, normal gradients and mild kaye prosthetic regurg.   3. Poorly visualized anatomical structures due to suboptimal image quality.   4. The left atrium is enlarged.   5. There is moderate mitral annular calcification.   6. Aortic valve appears abnormal.   7. Severe aortic valve stenosis.   8. There is severe aortic valve cusp calcification.   9. The patient is in atrial fibrillation which may influence the estimate of left  ventricular function and transvalvular flows.    [facility] 11/2/2022  1. Left ventricular systolic function is normal with a 65-70% estimated ejection fraction.   2. Spectral Doppler shows a restrictive pattern of left ventricular diastolic filling.   3. There is moderate concentric left ventricular hypertrophy.   4. The left atrium is moderately dilated.   5. There is a small pedunculated mobile echodense structure attached to the anterior wall of the left atrial appendage. It is not typical of a athrombus.   6. Moderate tricuspid regurgitation visualized.   7. Severe aortic valve stenosis.   8. There is moderate aortic valve cusp calcification.   9. Mild aortic valve regurgitation.  10. No left atrial mass.  11. No left atrial thrombus.  12. Small patent foramen ovale.  13. There is plaque visualized in the descending aorta.     Echocardiogram 5/28/2017   1. The left ventricular systolic function is normal with a 55% estimated ejection fraction.   2. Spectral Doppler shows an impaired relaxation pattern of left ventricular diastolic filling.   3. Moderate aortic valve stenosis.    Ejection Fractions:  LVEF 60-65%  Cath:  Heart cath 10/24/2022  1. Non obstructive CAD.   Stress Test:    Cardiac Imaging:  TAVR 11/22/2022  1. Transcatheter aortic valve replacement with successful implantation of an Collins Sapien3 29 mm valve.   2. Patient was enrolled in a research study and data was included in the TVT Registry.    Assessment/Plan   Mr. Mccarty is a very pleasant 74 year old gentleman with a history of aortic stenosis s/p TAVR (11/2022), HTN, HLD, atrial fibrillation (Xarelto) and HFpEF, he continues to do well from a cardiac standpoint. He is euvolemic on exam       Plan   -call with any questions   -follow up in one year   -continue Carvedilol 25 mg twice a day, Hydralazine 50 mg twice a day, and Xarelto 20 mg daily   -continue Lasix 40 mg daily         Tiffani Hopkins, APRN-CNP

## 2025-03-19 ENCOUNTER — OFFICE VISIT (OUTPATIENT)
Dept: HEMATOLOGY/ONCOLOGY | Facility: CLINIC | Age: 75
End: 2025-03-19
Payer: MEDICARE

## 2025-03-19 ENCOUNTER — APPOINTMENT (OUTPATIENT)
Dept: HEMATOLOGY/ONCOLOGY | Facility: CLINIC | Age: 75
End: 2025-03-19
Payer: MEDICARE

## 2025-03-19 ENCOUNTER — INFUSION (OUTPATIENT)
Dept: HEMATOLOGY/ONCOLOGY | Facility: CLINIC | Age: 75
End: 2025-03-19
Payer: MEDICARE

## 2025-03-19 VITALS
HEART RATE: 65 BPM | SYSTOLIC BLOOD PRESSURE: 144 MMHG | OXYGEN SATURATION: 93 % | TEMPERATURE: 97.9 F | RESPIRATION RATE: 17 BRPM | WEIGHT: 315 LBS | DIASTOLIC BLOOD PRESSURE: 74 MMHG | BODY MASS INDEX: 43.77 KG/M2

## 2025-03-19 DIAGNOSIS — M10.00 IDIOPATHIC GOUT, UNSPECIFIED CHRONICITY, UNSPECIFIED SITE: ICD-10-CM

## 2025-03-19 DIAGNOSIS — D50.9 IRON DEFICIENCY ANEMIA, UNSPECIFIED IRON DEFICIENCY ANEMIA TYPE: ICD-10-CM

## 2025-03-19 DIAGNOSIS — N18.4 ANEMIA DUE TO STAGE 4 CHRONIC KIDNEY DISEASE (MULTI): Primary | ICD-10-CM

## 2025-03-19 DIAGNOSIS — N18.32 CHRONIC KIDNEY DISEASE, STAGE 3B (MULTI): ICD-10-CM

## 2025-03-19 DIAGNOSIS — R10.84 GENERALIZED ABDOMINAL PAIN: ICD-10-CM

## 2025-03-19 DIAGNOSIS — D63.1 ANEMIA DUE TO STAGE 4 CHRONIC KIDNEY DISEASE (MULTI): ICD-10-CM

## 2025-03-19 DIAGNOSIS — I12.9 HYPERTENSIVE CHRONIC KIDNEY DISEASE WITH STAGE 1 THROUGH STAGE 4 CHRONIC KIDNEY DISEASE, OR UNSPECIFIED CHRONIC KIDNEY DISEASE: ICD-10-CM

## 2025-03-19 DIAGNOSIS — N18.4 ANEMIA DUE TO STAGE 4 CHRONIC KIDNEY DISEASE (MULTI): ICD-10-CM

## 2025-03-19 DIAGNOSIS — N28.9 KIDNEY DISEASE: ICD-10-CM

## 2025-03-19 DIAGNOSIS — D63.1 ANEMIA DUE TO STAGE 4 CHRONIC KIDNEY DISEASE (MULTI): Primary | ICD-10-CM

## 2025-03-19 DIAGNOSIS — I10 ESSENTIAL (PRIMARY) HYPERTENSION: ICD-10-CM

## 2025-03-19 DIAGNOSIS — D61.818 PANCYTOPENIA: ICD-10-CM

## 2025-03-19 LAB
ALBUMIN SERPL BCP-MCNC: 3.9 G/DL (ref 3.4–5)
ALP SERPL-CCNC: 54 U/L (ref 33–136)
ALT SERPL W P-5'-P-CCNC: 12 U/L (ref 10–52)
ANION GAP SERPL CALC-SCNC: 21 MMOL/L (ref 10–20)
AST SERPL W P-5'-P-CCNC: 17 U/L (ref 9–39)
BASOPHILS # BLD AUTO: 0.02 X10*3/UL (ref 0–0.1)
BASOPHILS NFR BLD AUTO: 0.4 %
BILIRUB SERPL-MCNC: 0.6 MG/DL (ref 0–1.2)
BUN SERPL-MCNC: 25 MG/DL (ref 6–23)
CALCIUM SERPL-MCNC: 8.8 MG/DL (ref 8.6–10.3)
CHLORIDE SERPL-SCNC: 103 MMOL/L (ref 98–107)
CO2 SERPL-SCNC: 17 MMOL/L (ref 21–32)
CREAT SERPL-MCNC: 2.02 MG/DL (ref 0.5–1.3)
EGFRCR SERPLBLD CKD-EPI 2021: 34 ML/MIN/1.73M*2
EOSINOPHIL # BLD AUTO: 0.23 X10*3/UL (ref 0–0.4)
EOSINOPHIL NFR BLD AUTO: 4.8 %
ERYTHROCYTE [DISTWIDTH] IN BLOOD BY AUTOMATED COUNT: 13.8 % (ref 11.5–14.5)
FERRITIN SERPL-MCNC: 54 NG/ML (ref 20–300)
GLUCOSE SERPL-MCNC: 90 MG/DL (ref 74–99)
HCT VFR BLD AUTO: 40 % (ref 41–52)
HGB BLD-MCNC: 12.6 G/DL (ref 13.5–17.5)
IMM GRANULOCYTES # BLD AUTO: 0.01 X10*3/UL (ref 0–0.5)
IMM GRANULOCYTES NFR BLD AUTO: 0.2 % (ref 0–0.9)
IRON SATN MFR SERPL: 29 % (ref 25–45)
IRON SERPL-MCNC: 97 UG/DL (ref 35–150)
LYMPHOCYTES # BLD AUTO: 1.16 X10*3/UL (ref 0.8–3)
LYMPHOCYTES NFR BLD AUTO: 24.1 %
MCH RBC QN AUTO: 33.3 PG (ref 26–34)
MCHC RBC AUTO-ENTMCNC: 31.5 G/DL (ref 32–36)
MCV RBC AUTO: 106 FL (ref 80–100)
MONOCYTES # BLD AUTO: 0.32 X10*3/UL (ref 0.05–0.8)
MONOCYTES NFR BLD AUTO: 6.6 %
NEUTROPHILS # BLD AUTO: 3.08 X10*3/UL (ref 1.6–5.5)
NEUTROPHILS NFR BLD AUTO: 63.9 %
PLATELET # BLD AUTO: 113 X10*3/UL (ref 150–450)
POTASSIUM SERPL-SCNC: 3.9 MMOL/L (ref 3.5–5.3)
PROT SERPL-MCNC: 7 G/DL (ref 6.4–8.2)
RBC # BLD AUTO: 3.78 X10*6/UL (ref 4.5–5.9)
SODIUM SERPL-SCNC: 137 MMOL/L (ref 136–145)
TIBC SERPL-MCNC: 332 UG/DL (ref 240–445)
UIBC SERPL-MCNC: 235 UG/DL (ref 110–370)
WBC # BLD AUTO: 4.8 X10*3/UL (ref 4.4–11.3)

## 2025-03-19 PROCEDURE — 99213 OFFICE O/P EST LOW 20 MIN: CPT | Mod: 25

## 2025-03-19 PROCEDURE — 1160F RVW MEDS BY RX/DR IN RCRD: CPT

## 2025-03-19 PROCEDURE — 36415 COLL VENOUS BLD VENIPUNCTURE: CPT

## 2025-03-19 PROCEDURE — 1157F ADVNC CARE PLAN IN RCRD: CPT

## 2025-03-19 PROCEDURE — 83550 IRON BINDING TEST: CPT

## 2025-03-19 PROCEDURE — 85025 COMPLETE CBC W/AUTO DIFF WBC: CPT

## 2025-03-19 PROCEDURE — 82728 ASSAY OF FERRITIN: CPT

## 2025-03-19 PROCEDURE — 3078F DIAST BP <80 MM HG: CPT

## 2025-03-19 PROCEDURE — 82746 ASSAY OF FOLIC ACID SERUM: CPT | Mod: GEALAB

## 2025-03-19 PROCEDURE — 99203 OFFICE O/P NEW LOW 30 MIN: CPT

## 2025-03-19 PROCEDURE — 1159F MED LIST DOCD IN RCRD: CPT

## 2025-03-19 PROCEDURE — 1126F AMNT PAIN NOTED NONE PRSNT: CPT

## 2025-03-19 PROCEDURE — 84075 ASSAY ALKALINE PHOSPHATASE: CPT

## 2025-03-19 PROCEDURE — 82607 VITAMIN B-12: CPT | Mod: GEALAB

## 2025-03-19 PROCEDURE — 3077F SYST BP >= 140 MM HG: CPT

## 2025-03-19 RX ORDER — FAMOTIDINE 10 MG/ML
20 INJECTION, SOLUTION INTRAVENOUS ONCE AS NEEDED
OUTPATIENT
Start: 2025-04-24

## 2025-03-19 RX ORDER — DIPHENHYDRAMINE HYDROCHLORIDE 50 MG/ML
50 INJECTION, SOLUTION INTRAMUSCULAR; INTRAVENOUS AS NEEDED
OUTPATIENT
Start: 2025-04-24

## 2025-03-19 RX ORDER — ALBUTEROL SULFATE 0.83 MG/ML
3 SOLUTION RESPIRATORY (INHALATION) AS NEEDED
OUTPATIENT
Start: 2025-04-24

## 2025-03-19 RX ORDER — EPINEPHRINE 0.3 MG/.3ML
0.3 INJECTION SUBCUTANEOUS EVERY 5 MIN PRN
OUTPATIENT
Start: 2025-04-24

## 2025-03-19 ASSESSMENT — PATIENT HEALTH QUESTIONNAIRE - PHQ9
SUM OF ALL RESPONSES TO PHQ9 QUESTIONS 1 AND 2: 0
1. LITTLE INTEREST OR PLEASURE IN DOING THINGS: NOT AT ALL
2. FEELING DOWN, DEPRESSED OR HOPELESS: NOT AT ALL

## 2025-03-19 ASSESSMENT — PAIN SCALES - GENERAL: PAINLEVEL_OUTOF10: 0-NO PAIN

## 2025-03-19 NOTE — PROGRESS NOTES
[facility]    PATIENT VISIT INFORMATION    Visit Type: Follow up visit     Referring Provider: Unknown   Reason for referral: Anemia  Primary Practice Provider: Luis Eduardo Kay DO    CANCER/HEMATOLGOY HISTORY      Hematology History:  Patient is a 74-year-old male that presents with his wife, Sole, for follow up of pancytopenia. Patient presents in a wheelchair.  Outside lab records indicate pancytopenia ongoing since 2014.  Wife states he has been hospitalized in 2017 for over 5 months for osteomyelitis and septicemia.  He has chronic urinary tract infections. Last imaging CT chest abdomen and pelvis in 2022 reveal CAD, atherosclerosis, calcified aortic valve, pulmonary hypertension, dilated pulmonary artery and hernia.    Head CT in 2021 revealed maxillary sinus thickening but no acute issues.   It was also noted in 2017 that he had internal bleeding and a colonoscopy and EGD was performed in which the patient states has resolved. Platelet and blood transfusions administered.    Bone Marrow Biopsy  FINAL DIAGNOSIS 6/6/2024   A-C. BONE MARROW CLOT, CORE BIOPSY, AND ASPIRATE SMEAR, RIGHT ILIAC CREST:   -- SLIGHTLY HYPERCELLULAR BONE MARROW FOR AGE (50-60%) WITH MATURING TRILINEAGE HEMATOPOIESIS.   There is no definite morphologic, immunophenotypic and molecular evidence of myeloid neoplasm in the given sample. The possible etiologies for persistent cytopenias includes but not limited to infectious, autoimmune, medication induced or toxin related causes.   Here to discuss bone marrow biopsy that shows likely toxin chronic infection related.  Hypercellular bone marrow though no dysplasia, no definite morphologic, immunophenotypic and molecular evidence of myeloid neoplasm in the given sample. The possible etiologies for persistent cytopenias includes but not limited to infectious, autoimmune, medication induced or toxin related causes. He plans on decreasing alcohol intake slowly  "to eventually quit in the next few weeks.  Advised to discuss this with his primary care.  Encouraged him to quit drinking appropriately.  Additionally, recurrent urinary tract infections.  Anemia persists.  Discussed blood pressure readings patient states this is due to whitecoat syndrome and that his blood pressure is 120s/80s at home.    HISTORY OF PRESENT ILLNESS     ID Statement:  PAMELA CANCINO is a 74 year old Male     Chief Complaint: \"I feel really good\"    Interval History:   He presents today in WC with wife, Sole.  Overall he is doing much better.  Today is his birthday.  Improved energy.  He continues to have his urinary issues.  Follows urology.  He has quit drinking heavy liquor. He notes more energy and interest in doing things.  July he was admitted for small bowel obstruction related to the hernia incarceration. Plan for hernia repair.  In the process surgical clearance.  He has not needed Procrit injections the last 3 assessments.  Hemoglobin in the 12's.  Appetite good no unintentional weight loss.  Hydrating with fluid.   He denies any headache, lymphadenopathy, fever, night sweats, shortness of breath, chest pain or palpitations, abdominal pain, constipation, diarrhea, nausea, vomiting, bloody stool or dark stool. Urinary issues are chronic and include dysuria, frequency and urgency.  Recently saw Nephrology who reports kidney function stable CKD. Nocturia continues and follows with urology and nephrology. He has sleep apnea and is on a CPAP.  Denies any other concerns.    Imaging Studies:  US abdomen on 08/31/2023 shows calculi vs sludge in gallbladder, hepatomegaly with similar appearance of hepatic cyst in left lobe of liver. Previous CT abdomen 04/11/2014 shows fatty liver, cyst in left lobe, portal HTN, likely cirrhosis of the liver. Borderline splenomegaly. Repeat CT abdomen 07/22/2018 shows hepatic cysts in left lobe, suggest benign etiology. Renal US 05/29/2017 and 07/22/2020, " both unremarkable. On 2019 Ct Pelvis shows umbilical hernia and no prostate abscess. US renal complete 2024 Essentially negative renal ultrasound.    PAST/CURRENT HISTORY     Medical history:  -Hypertension  -Aortic valve stenosis  -Chronic UTI  -A-fib on Xarelto  -Hearing loss sensorineural  -Dizziness and vertigo  -Erectile dysfunction  -GERD  -Idiopathic neuropathy  -Back pain  -Obesity  -Peptic ulcer hemorrhage  -Vitamin D3 def  -Vitamin B12  -Tinnitus   -Anemia as above  -Pancytopenia as above     Social History:  - lives with wife Sole and two children (boy and girl)   -retired  -Most recent alcohol intake was several months ago has decreased his heavy liquor.  -Denies smoking or suing tobacco   -Occasional Marijuana use  -No illicit drug uses      Family History:  -mom  52 years breast cancer  -dad  84 years vascular disease   -Niece leukemia  as a teenager  -Sister with hypertension  -Daughter immune disease  -Brother myocardial infarction     REVIEW OF SYSTEMS      Review of Systems:   System Review-All other systems including Neurologic, Cardiac, Gastrointestinal, Endocrine, Dermatologic, ENT, Respiratory, Infectious, Urologic, Musculoskeletal  have been reviewed and are negative for complaint outside of events noted below and within the assessment.        ALLERGIES AND MEDICATIONS      Allergies and Intolerances:       Allergies   Allergen Reactions    Levofloxacin Angioedema and Hives      Outpatient Medication Profile:  Current Outpatient Medications   Medication Instructions    acetaminophen (Tylenol) 500 mg tablet Take 1-2 by mouth every 6 hours as needed for pain    allopurinol (ZYLOPRIM) 100 mg, oral, Daily    carvedilol (COREG) 25 mg, oral, 2 times daily (morning and late afternoon), Take with food.    cetirizine (ZYRTEC) 10 mg, Every evening    febuxostat (ULORIC) 40 mg, oral, Daily    folic acid (FOLVITE) 1 mg, oral, Daily    furosemide (LASIX) 40 mg,  oral, Daily    hydrALAZINE (APRESOLINE) 50 mg, oral, 2 times daily    ketoconazole (NIZOral) 2 % cream APPLY TO AFFECTED AREA EVERY MORNING TILL CLEAR, REPEAT AS NEEDED.    levothyroxine (Synthroid, Levoxyl) 88 mcg tablet TAKE 1 TABLET BY MOUTH EVERY DAY ON EMPTY STOMACH    phenazopyridine (PYRIDIUM) 200 mg, 3 times daily (morning, midday, late afternoon)    predniSONE (Deltasone) 10 mg tablet if needed.    Ghazal-Chi Rx 1- mg-mg-mcg tablet 1 tablet, oral, Daily, Take with food.    tamsulosin (FLOMAX) 0.4 mg, oral, Daily    Xarelto 20 mg, oral, Daily with evening meal      Past Medical History:   Diagnosis Date    (HFpEF) heart failure with preserved ejection fraction     Anemia     Aortic valve stenosis     S/P TAVR    Arthritis     Atrial fibrillation 10/10/2021    Benign prostatic hyperplasia with urinary retention     self caths 3-4 times daily    calcified aortic valve     Chronic UTI     CKD (chronic kidney disease)     Class 3 severe obesity with body mass index (BMI) of 40.0 to 44.9 in adult 08/19/2024    42.46 kg/m²    Coronary artery disease     ETOH abuse     Fatty liver     GERD (gastroesophageal reflux disease)     GI (gastrointestinal bleed)     History of hemorrhagic diathesis     History of spinal stenosis     History of transfusion 2017    GI bleed   no reaction    HL (hearing loss)     Hyperlipidemia     Hypertension     hypothyroidism     Idiopathic neuropathy     Osteomyelitis     Pancytopenia     Pulmonary HTN (Multi)     SBO (small bowel obstruction) (Multi)     Septicemia (Multi)     Sleep apnea     Sleep apnea with use of continuous positive airway pressure (CPAP)     Thrombocytopenia concurrent with and due to alcoholism (Multi)     Umbilical hernia     Urinary tract infection 2018    Uses wheelchair     Vitamin B12 deficiency     Vitamin D deficiency     Wears hearing aid in both ears       PHYSICAL EXAM     Performance:   ECOG Performance Status: 2- Ambulatory 50% of waking  hours        Grade ECOG performance status   0 Fully active, able to carry on all pre-disease performance without restriction   1 Restricted in physically strenuous activity but ambulatory and able to carry out work of a light or sedentary nature, e.g., light housework, office work   2 Ambulatory and capable of all selfcare but unable to carry out any work activities; Up and about more than 50% of waking hours   3 Capable of only limited selfcare, confined to bed or chair more than 50% of waking hours   4 Completely disabled; Cannot carry out any selfcare; Totally confined to bed or chair   5 Dead        Vitals and Measurements:       11/13/2024    10:30 AM 11/20/2024    11:59 AM 1/15/2025    12:03 PM 2/5/2025     1:00 PM 2/26/2025     1:29 PM 2/28/2025    10:45 AM 3/19/2025     1:27 PM   Vitals   Systolic 137 116 152 153 156 110 144   Diastolic 70 67 53 69 70 66 74   BP Location Right arm Left arm Right arm Left arm Left arm  Right arm   Heart Rate 60 55 77 55 70 79 65   Temp 36.1 °C (97 °F) 36 °C (96.8 °F) 36 °C (96.8 °F) 36.4 °C (97.5 °F) 35.8 °C (96.4 °F)  36.6 °C (97.9 °F)   Resp 17 17 18 18 18  17   Weight (lb) 325.51 325.51 336.64 340.72 342.37 343.26 340.94   BMI 41.79 kg/m2 41.79 kg/m2 43.22 kg/m2 43.75 kg/m2 43.96 kg/m2 44.07 kg/m2 43.77 kg/m2   BSA (m2) 2.78 m2 2.78 m2 2.83 m2 2.84 m2 2.84 m2 2.85 m2 2.84 m2   Visit Report Report     Report Report      Physical Exam:      Constitutional: Improved complexion, awake/alert/oriented  x3, no distress, alert and cooperative, patient presents in wheelchair overall appears well   Eyes: clear scleras   ENMT: mucous membranes moist, no apparent injury, no lesions seen   Head/Neck: Neck supple, no apparent injury, thyroid  without mass or tenderness, No JVD, trachea midline, no bruits   Respiratory/Thorax: Patent airways, CTAB, diminished bases bilateral, normal  breath sounds with good chest expansion, thorax symmetric   Cardiovascular: arrhythmia at baseline, no murmurs, no  swelling bilateral legs improved from prior visits   Gastrointestinal: Obese, soft, non-tender, no rebound tenderness or guarding, unable to palpate liver or spleen   Musculoskeletal: ROM intact, no joint swelling, normal  strength   Extremities: Lower extremities reduced swelling, no skin lesions or sores.  Dry skin resolved from previous assessment   Neurological: alert and oriented x3, intact senses,  motor, response and reflexes, normal strength for baseline   Lymphatic: No significant cervical or subclavicular  lymphadenopathy   Psychological: Appropriate mood and behavior. Smiling.    Skin: Warm and dry, no lesions, no rashes      RESULTS/DATA     Lab Results:    Lab Results   Component Value Date    WBC 4.8 03/19/2025    NEUTROABS 3.08 03/19/2025    IGABSOL 0.01 03/19/2025    LYMPHSABS 1.16 03/19/2025    MONOSABS 0.32 03/19/2025    EOSABS 0.23 03/19/2025    BASOSABS 0.02 03/19/2025    RBC 3.78 (L) 03/19/2025     (H) 03/19/2025    MCHC 31.5 (L) 03/19/2025    HGB 12.6 (L) 03/19/2025    HCT 40.0 (L) 03/19/2025     (L) 03/19/2025     Lab Results   Component Value Date    RETICCTPCT 3.3 (H) 02/29/2024      Lab Results   Component Value Date    CREATININE 1.87 (H) 11/20/2024    BUN 29 (H) 11/20/2024    EGFR 37 (L) 11/20/2024     (L) 11/20/2024    K 4.0 11/20/2024     11/20/2024    CO2 23 11/20/2024      Lab Results   Component Value Date    ALT 8 (L) 11/20/2024    AST 16 11/20/2024    ALKPHOS 67 11/20/2024    BILITOT 0.5 11/20/2024      Lab Results   Component Value Date    TSH 2.56 07/17/2024     Lab Results   Component Value Date    TSH 2.56 07/17/2024     Lab Results   Component Value Date    IRON 98 02/05/2025    TIBC 348 02/05/2025    FERRITIN 61 02/05/2025      Lab Results   Component Value Date    TCBNVNPK74 538 11/20/2024      Lab Results   Component Value Date    FOLATE >24.0 11/20/2024     Lab Results   Component Value Date    SEDRATE 6 08/24/2023      Lab Results   Component  Value Date    CRP CANCELED 08/24/2023     Lab Results   Component Value Date     02/29/2024     Lab Results   Component Value Date    HAPTOGLOBIN 124 11/30/2023     Lab Results   Component Value Date    SPEP Increase in polyclonal gamma globulins.   02/29/2024     Lab Results   Component Value Date    IGG 1,500 02/29/2024     02/29/2024     02/29/2024        Lab Results   Component Value Date    WBC 4.8 03/19/2025    NEUTROABS 3.08 03/19/2025    IGABSOL 0.01 03/19/2025    LYMPHSABS 1.16 03/19/2025    MONOSABS 0.32 03/19/2025    EOSABS 0.23 03/19/2025    BASOSABS 0.02 03/19/2025    RBC 3.78 (L) 03/19/2025     (H) 03/19/2025    MCHC 31.5 (L) 03/19/2025    HGB 12.6 (L) 03/19/2025    HCT 40.0 (L) 03/19/2025     (L) 03/19/2025     Lab Results   Component Value Date    RETICCTPCT 3.3 (H) 02/29/2024      Lab Results   Component Value Date    CREATININE 1.87 (H) 11/20/2024    BUN 29 (H) 11/20/2024    EGFR 37 (L) 11/20/2024     (L) 11/20/2024    K 4.0 11/20/2024     11/20/2024    CO2 23 11/20/2024      Lab Results   Component Value Date    ALT 8 (L) 11/20/2024    AST 16 11/20/2024    ALKPHOS 67 11/20/2024    BILITOT 0.5 11/20/2024      Lab Results   Component Value Date    TSH 2.56 07/17/2024     Lab Results   Component Value Date    TSH 2.56 07/17/2024     Lab Results   Component Value Date    IRON 98 02/05/2025    TIBC 348 02/05/2025    FERRITIN 61 02/05/2025      Lab Results   Component Value Date    LTCLQIUZ18 538 11/20/2024      Lab Results   Component Value Date    FOLATE >24.0 11/20/2024     Lab Results   Component Value Date    SEDRATE 6 08/24/2023      Lab Results   Component Value Date    CRP CANCELED 08/24/2023        Lab Results   Component Value Date     02/29/2024     Lab Results   Component Value Date    HAPTOGLOBIN 124 11/30/2023     Lab Results   Component Value Date    SPEP Increase in polyclonal gamma globulins.   02/29/2024     Lab Results   Component  Value Date    IGG 1,500 02/29/2024     02/29/2024     02/29/2024     Lab Results   Component Value Date    URICACID 8.0 (H) 05/02/2024        ASSESSMENT/PLAN      Assessment and Plan:   Pleasant 75-year-old male presents for pancytopenia follow-up.  Discussed likely contributors below along with not being able to R/O MDS. Also discussed daily alcohol use and chronic urinary tract infections as playing a role in his pancytopenia.  Pathologist review of CBCd reveal macrocytic anemia. Supplements of vital vitamin nutrients advised and prescribed.  Discussed the nutritional deficiency, liver disease and alcohol intake as likely causation. His MCV is 119, more deficient from previous value of 107-119 over the last year. WBC 4.8, Hgb 9 and dropping while taking supplements. RBC 2.49 and WBC 4.0, platelets are 85. His daily alcohol consumption is of concern for pancytopenia. He states understanding.  Concern for bleeding, he denies any signs of bleeding.  He is in agreement for bone marrow biopsy.  Iron panel stable.  Ferritin normal range.  Likely anemia of chronic disease versus bleeding, as additional differential.     Bone Marrow Biopsy  FINAL DIAGNOSIS 6/6/2024   A-C. BONE MARROW CLOT, CORE BIOPSY, AND ASPIRATE SMEAR, RIGHT ILIAC CREST:   -- SLIGHTLY HYPERCELLULAR BONE MARROW FOR AGE (50-60%) WITH MATURING TRILINEAGE HEMATOPOIESIS.   There is no definite morphologic, immunophenotypic and molecular evidence of myeloid neoplasm in the given sample. The possible etiologies for persistent cytopenias includes but not limited to infectious, autoimmune, medication induced or toxin related causes.     Patient's hemoglobin is in the 9-10 range.  Tolerating Epogen injections.  Patient states blood pressure is managed at home and he has whitecoat syndrome while in the office.  Recent changes to his blood pressure medication.    Additional Observations, history, and referrals:  He is wheel chair bound other than  pivot and transfer independently, increased weakness. His physical activity is limited by physical pain back and joints. Obesity noted. He has followed inpatient cardiology 09/2021 and 11/2022 for severe aortic stenosis and a-flutter. Follow cardiology yearly for ECHO and assessment. He is on Xarelto and ASA. Followed GI 08/31/2017 for colonoscopy for LLQ pain. Redundant colon noted. No specimens taken. Repeat five years recommended.   Patient could have IBS secondary to alcohol abuse.  Patient originally followed with GI/hepatology.  Who did not feel liver biopsy was necessary as obvious evidence of disease not noted.  He will avoid a FibroScan because he thinks it will have a false positive given alcohol use.  Her Dr. Alcides Ohara's note dated 11/30/2023.    8/14/2024 patient improved since Procrit injections.  He will receive today as his hemoglobin is 10.4.  Platelets are stable.  No bleeding or bruising.    11/13/2024 patient overall appears to be doing much better.  Hemoglobin A1c obtained taking the last 2 assessments lipids.  We will see at the next assessment if he receives Procrit.  If not needed, we will decrease injection times to every 21 days.  He and wife in agreement.  He will have labs drawn next week to assess B12 and folate.  He continues to take the supplements.  He will follow in 4 months.    3/19/2025 patient doing very well.  He has not needed the last 3 Procrit injections.  Reduced orders to every 28 days.  Patient would like to have CBC drawn prior to scheduling appointment.  If patient does not need Procrit injection in the next couple months we will order as needed.    **Please follow with specialties as scheduled for other health needs and routine screenings.**    Follow-up:  RTC:  - 6 months with labs  -Monthly Procrit ordered at 20, 000 units with standard protocol and administration guidelines per treatment plan    Medications:  -Procrit injection monthly    Imaging:  -NA      Referral/Additional appointments:   -Nephrology 4/22/2025   -Cardiology 2/27/2026    Discussed plan of care with patient and his wife. Patient states understanding and agreeable to plan of care.  Patient will call with any questions or concerns.      Happy birthday!!  Thank you allowing me to care for you today.      Sincerely,  RICARDA Baum-CNP     Hematology/Oncology Clinical Nurse Practitioner     [facility]   [id number]  [place] [id number]  [place] [id number]  Office #: [phone]    DISCLAIMER:   In preparing for this visit and writing this note, I reviewed all the previous electronic medical records (testing, labs, imaging, and other procedures, provider notes, and medical charts) of the patient available in the physician portal pertinent to patient care. Significant findings which helped in decision making are recorded in this chart.    This document may have been written by voice recognition software.  There may be some incorrect wording, spelling and/or spelling errors or punctuation errors that were not corrected prior to committing the note to the medical record. Please request clarification if there is documentation error or clarification is needed.   Time based billing: Please see documentation within this specific encounter.

## 2025-03-20 LAB
FOLATE SERPL-MCNC: >24 NG/ML
VIT B12 SERPL-MCNC: 354 PG/ML (ref 211–911)

## 2025-03-25 ENCOUNTER — APPOINTMENT (OUTPATIENT)
Dept: HEMATOLOGY/ONCOLOGY | Facility: CLINIC | Age: 75
End: 2025-03-25
Payer: MEDICARE

## 2025-04-02 ENCOUNTER — OFFICE VISIT (OUTPATIENT)
Dept: UROLOGY | Facility: HOSPITAL | Age: 75
End: 2025-04-02
Payer: MEDICARE

## 2025-04-02 DIAGNOSIS — N13.8 BPH WITH OBSTRUCTION/LOWER URINARY TRACT SYMPTOMS: Primary | ICD-10-CM

## 2025-04-02 DIAGNOSIS — N40.1 BPH WITH OBSTRUCTION/LOWER URINARY TRACT SYMPTOMS: Primary | ICD-10-CM

## 2025-04-02 PROCEDURE — 1157F ADVNC CARE PLAN IN RCRD: CPT | Performed by: STUDENT IN AN ORGANIZED HEALTH CARE EDUCATION/TRAINING PROGRAM

## 2025-04-02 PROCEDURE — 99212 OFFICE O/P EST SF 10 MIN: CPT | Performed by: STUDENT IN AN ORGANIZED HEALTH CARE EDUCATION/TRAINING PROGRAM

## 2025-04-02 PROCEDURE — 1159F MED LIST DOCD IN RCRD: CPT | Performed by: STUDENT IN AN ORGANIZED HEALTH CARE EDUCATION/TRAINING PROGRAM

## 2025-04-03 NOTE — PROGRESS NOTES
Subjective   Patient ID: Aly Mccarty is a 75 y.o. male    HPI  75 y.o. male who presents for a cystoscopic evaluation with lower urinary tract symptoms. The patient reports intermittency, straining, and urgency. He denies  hematuria . The patient states symptoms are of moderate severity. Onset of symptoms was several months ago and was gradual in onset. His AUA Symptom Score is 17/35 manifested as irritative symptoms including urgency. He has no personal history of prostate cancer. He reports a history of no complicating symptoms. He denies no complicating symptoms.    Today, he ***    The most recent Urine Culture, conducted on 04/02/2025, revealed:  ***           Review of Systems    All systems were reviewed. Anything negative was noted in the HPI.    Objective   Physical Exam    General: Well developed, well nourished, alert and cooperative, appears in no acute distress   Eyes: Non-injected conjunctiva, sclera clear, no proptosis   Cardiac: Extremities are warm and well perfused. No edema, cyanosis or pallor   Lungs: Breathing is easy, non-labored. Speaking in clear and complete sentences. Normal diaphragmatic movement   MSK: Ambulatory with steady gait, unassisted   Neuro: Alert and oriented to person, place, and time   Psych: Demonstrates good judgment and reason, without hallucinations, abnormal affect or abnormal behaviors   Skin: No obvious lesions, no rashes       No CVA tenderness bilaterally   No suprapubic pain or discomfort       Past Medical History:   Diagnosis Date    (HFpEF) heart failure with preserved ejection fraction     Anemia     Aortic valve stenosis     S/P TAVR    Arthritis     Atrial fibrillation 10/10/2021    Benign prostatic hyperplasia with urinary retention     self caths 3-4 times daily    calcified aortic valve     Chronic UTI     CKD (chronic kidney disease)     Class 3 severe obesity with body mass index (BMI) of 40.0 to 44.9 in adult 08/19/2024    42.46 kg/m²    Coronary  artery disease     ETOH abuse     Fatty liver     GERD (gastroesophageal reflux disease)     GI (gastrointestinal bleed)     History of hemorrhagic diathesis     History of spinal stenosis     History of transfusion 2017    GI bleed   no reaction    HL (hearing loss)     Hyperlipidemia     Hypertension     hypothyroidism     Idiopathic neuropathy     Osteomyelitis     Pancytopenia     Pulmonary HTN (Multi)     SBO (small bowel obstruction) (Multi)     Septicemia (Multi)     Sleep apnea     Sleep apnea with use of continuous positive airway pressure (CPAP)     Thrombocytopenia concurrent with and due to alcoholism (Multi)     Umbilical hernia     Urinary tract infection 2018    Uses wheelchair     Vitamin B12 deficiency     Vitamin D deficiency     Wears hearing aid in both ears          Past Surgical History:   Procedure Laterality Date    ABDOMINAL SURGERY      AORTIC VALVE REPLACEMENT      TAVR    CARDIAC SURGERY      COLONOSCOPY      EYE SURGERY      FRACTURE SURGERY      LEG SURGERY  08/08/2019    OTHER SURGICAL HISTORY  07/07/2021    Tonsillectomy with adenoidectomy    PICC LINE INSERTION WO IMAGING  08/08/2019    TONSILLECTOMY      UPPER GASTROINTESTINAL ENDOSCOPY      VASCULAR SURGERY      VASECTOMY  1984     Procedure:  The patient was prepped using a Betadine solution. Lidocaine jelly was instilled into the urethra. The flexible cystoscope was sterilely inserted into the urethra and formal cystoscopy performed in a systematic fashion. For detailed findings of the procedure, please see Dr. Lozada’s remarks below  Scope A used, Cipro 500 mg p.o. given    Assessment/Plan   Cystoscopic evaluation, Hx of bacteriemia, septicemia and urosepsis sp prolonged ICU stay, urinary retention    75 y.o. male who presents for the above condition, Today, we had a very long and extensive discussion with the patient regarding the pathophysiology, differential diagnosis, risk factor, associated condition, diagnostic work-up and  management of BPH and lower urinary tract symptoms and acute urinary retention. I discussed with the patient the need to check his PSA to assess his prostate cancer risk. We discussed at length the mechanism of action, risk, benefit, adverse events and side effect of alpha-blocker in the form of tamsulosin 0.4 mg p.o nightly. We discussed in particular the risk of hypotension, lightheadedness, dizziness, and the risk of fall and bone fracture. Also discussed retrograde ejaculation of the side effects of the medication. We had another discussion with the patient regarding lifestyle modifications including low fluid intake after 5 PM, timed voiding every 2 hours, and decrease caffeine intake. I discussed with the patient that in case he had an elevated PSA, we will proceed do an MRI of the prostate.      Plan:  - ***    E&M visit today is associated with current or anticipated ongoing medical care services related to a patient's single, serious condition or a complex condition.     4/8/2025    Scribe Attestation  By signing my name below, I, ***, Scribe attest that this documentation has been prepared under the direction and in the presence of Dr. Alfredito Lozada.

## 2025-04-03 NOTE — PROGRESS NOTES
Aly Mccarty is a 75 y.o. who presents today for evaluation of lower urinary tract symptoms, increase urination, recurrent UTIs. The patient reports intermittency, straining, and urgency. He is having the need to CIC more than 5 times daily, even waking up at night multiple time. He denies  hematuria The patient states symptoms are of moderate- severe. Onset of symptoms was several months ago and was gradual in onset. His AUA Symptom Score is 25/35 manifested as irritative symptoms including urgency, frequency, dysuria. He has no personal history of prostate cancer. He reports a history of no complicating symptoms. He denies no complicating symptoms.     Lab Review       Lab Results  Component Value Date    PSA 1.24 07/12/2023        Objective  There were no vitals taken for this visit.  Physical Exam  Constitutional:       Appearance: Normal appearance.   HENT:      Head: Normocephalic and atraumatic.      Nose: Nose normal.   Pulmonary:      Effort: No respiratory distress.   Abdominal:      General: There is no distension.   Neurological:      Mental Status: He is alert.         Review of Systems   All other systems reviewed and are negative.              Assessment/Plan  hx of bacteriemia, septicemia and urosepsis sp prolonged ICU stay, urinary retention, CIC needed more than 5 times daily, recent UTI       75 year-old very pleasant gentleman presenting with the above condition. We had a very long and extensive discussion with the patient regarding the pathophysiology, differential diagnosis, risk factor, management, natural history, incidence and diagnostic work-up of the condition.      Today, we had a very long and extensive discussion with the patient regarding the pathophysiology, differential diagnosis, risk factor, associated condition, diagnostic work-up and management of BPH and lower urinary tract symptoms and acute urinary retention. I discussed with the patient the need to check his PSA to  assess his prostate cancer risk. We discussed at length the mechanism of action, risk, benefit, adverse events and side effect of alpha-blocker in the form of tamsulosin 0.4 mg p.o nightly. We discussed in particular the risk of hypotension, lightheadedness, dizziness, and the risk of fall and bone fracture. Also discussed retrograde ejaculation of the side effects of the medication. We had another discussion with the patient regarding lifestyle modifications including low fluid intake after 5 PM, timed voiding every 2 hours, and decrease caffeine intake. I discussed with the patient that in case he had an elevated PSA, we will proceed do an MRI of the prostate.        Plan  Cysto  Possible Mendoza insertion next visit

## 2025-04-05 LAB — BACTERIA UR CULT: ABNORMAL

## 2025-04-08 ENCOUNTER — PROCEDURE VISIT (OUTPATIENT)
Dept: UROLOGY | Facility: HOSPITAL | Age: 75
End: 2025-04-08
Payer: MEDICARE

## 2025-04-08 VITALS — HEART RATE: 48 BPM | SYSTOLIC BLOOD PRESSURE: 147 MMHG | DIASTOLIC BLOOD PRESSURE: 62 MMHG

## 2025-04-08 DIAGNOSIS — N31.9 NEUROGENIC BLADDER: ICD-10-CM

## 2025-04-08 DIAGNOSIS — N40.1 BPH WITH OBSTRUCTION/LOWER URINARY TRACT SYMPTOMS: Primary | ICD-10-CM

## 2025-04-08 DIAGNOSIS — N13.8 BPH WITH OBSTRUCTION/LOWER URINARY TRACT SYMPTOMS: Primary | ICD-10-CM

## 2025-04-08 PROCEDURE — 99214 OFFICE O/P EST MOD 30 MIN: CPT | Performed by: STUDENT IN AN ORGANIZED HEALTH CARE EDUCATION/TRAINING PROGRAM

## 2025-04-08 PROCEDURE — 52000 CYSTOURETHROSCOPY: CPT | Performed by: STUDENT IN AN ORGANIZED HEALTH CARE EDUCATION/TRAINING PROGRAM

## 2025-04-08 NOTE — PROGRESS NOTES
Subjective   Patient ID: Aly Mccarty is a 75 y.o. male    HPI  75 y.o. male who presents for a cystoscopic evaluation with lower urinary tract symptoms. The patient reports intermittency, straining, and urgency. He denies hematuria. The patient states symptoms are of moderate severity. Onset of symptoms was several months ago and was gradual in onset. His AUA Symptom Score is 17/35 manifested as irritative symptoms including urgency. He has no personal history of prostate cancer. He reports a history of no complicating symptoms. He denies no complicating symptoms.    Today, he reports increased x3 urinary frequency and urgency. These symptoms are worse at night. He is currently on Lasix 40 mg once daily.       The most recent Urine Culture, conducted on 04/02/2025, revealed:  For uncomplicated UTI caused by E. coli, K. pneumoniae or P. mirabilis: Cefazolin is susceptible if CHARLETTE <32 mcg/mL and predicts     susceptible to the oral agents cefaclor, cefdinir, cefpodoxime, cefprozil, cefuroxime, cephalexin, and loracarbef.       Review of Systems    All systems were reviewed. Anything negative was noted in the HPI.    Objective   Physical Exam    General: Well developed, well nourished, alert and cooperative, appears in no acute distress   Eyes: Non-injected conjunctiva, sclera clear, no proptosis   Cardiac: Extremities are warm and well perfused. No edema, cyanosis or pallor   Lungs: Breathing is easy, non-labored. Speaking in clear and complete sentences. Normal diaphragmatic movement   MSK: Ambulatory with steady gait, unassisted   Neuro: Alert and oriented to person, place, and time   Psych: Demonstrates good judgment and reason, without hallucinations, abnormal affect or abnormal behaviors   Skin: No obvious lesions, no rashes       No CVA tenderness bilaterally   No suprapubic pain or discomfort       Past Medical History:   Diagnosis Date    (HFpEF) heart failure with preserved ejection fraction     Anemia      Aortic valve stenosis     S/P TAVR    Arthritis     Atrial fibrillation 10/10/2021    Benign prostatic hyperplasia with urinary retention     self caths 3-4 times daily    calcified aortic valve     Chronic UTI     CKD (chronic kidney disease)     Class 3 severe obesity with body mass index (BMI) of 40.0 to 44.9 in adult 08/19/2024    42.46 kg/m²    Coronary artery disease     ETOH abuse     Fatty liver     GERD (gastroesophageal reflux disease)     GI (gastrointestinal bleed)     History of hemorrhagic diathesis     History of spinal stenosis     History of transfusion 2017    GI bleed   no reaction    HL (hearing loss)     Hyperlipidemia     Hypertension     hypothyroidism     Idiopathic neuropathy     Osteomyelitis     Pancytopenia     Pulmonary HTN (Multi)     SBO (small bowel obstruction) (Multi)     Septicemia (Multi)     Sleep apnea     Sleep apnea with use of continuous positive airway pressure (CPAP)     Thrombocytopenia concurrent with and due to alcoholism (Multi)     Umbilical hernia     Urinary tract infection 2018    Uses wheelchair     Vitamin B12 deficiency     Vitamin D deficiency     Wears hearing aid in both ears          Past Surgical History:   Procedure Laterality Date    ABDOMINAL SURGERY      AORTIC VALVE REPLACEMENT      TAVR    CARDIAC SURGERY      COLONOSCOPY      EYE SURGERY      FRACTURE SURGERY      LEG SURGERY  08/08/2019    OTHER SURGICAL HISTORY  07/07/2021    Tonsillectomy with adenoidectomy    PICC LINE INSERTION WO IMAGING  08/08/2019    TONSILLECTOMY      UPPER GASTROINTESTINAL ENDOSCOPY      VASCULAR SURGERY      VASECTOMY  1984     Procedure:  The patient was prepped using a Betadine solution. Lidocaine jelly was instilled into the urethra. The flexible cystoscope was sterilely inserted into the urethra and formal cystoscopy performed in a systematic fashion. For detailed findings of the procedure, please see Dr. Lozada’s remarks below  Scope A used, Cipro 500 mg p.o.  given    Assessment/Plan   Cystoscopic evaluation, Hx of bacteriemia, septicemia and urosepsis sp prolonged ICU stay, urinary retention    75 y.o. male who presents for the above condition, Today, we had a very long and extensive discussion with the patient regarding the pathophysiology, differential diagnosis, risk factor, associated condition, diagnostic work-up and management of BPH and lower urinary tract symptoms and acute urinary retention. I discussed with the patient the need to check his PSA to assess his prostate cancer risk. We discussed at length the mechanism of action, risk, benefit, adverse events and side effect of alpha-blocker in the form of tamsulosin 0.4 mg p.o nightly. We discussed in particular the risk of hypotension, lightheadedness, dizziness, and the risk of fall and bone fracture. Also discussed retrograde ejaculation of the side effects of the medication. We had another discussion with the patient regarding lifestyle modifications including low fluid intake after 5 PM, timed voiding every 2 hours, and decrease caffeine intake. I discussed with the patient that in case he had an elevated PSA, we will proceed do an MRI of the prostate.     Pt will need to self cath 5 times daily for more than 3 months CIC with a 16fr indefinitely for chronic retention.  Due to increased nocturia, pt is requesting to have a fierro placed every night by caretaker 16fr 10cc balloon, to be removed every morning        Plan:  - Cystoscopy performed today.  - Indwelling Fierro catheter inserted today for 2 weeks, followed by self-cath every night.  - Follow up in 8 weeks.   - Bactrim       E&M visit today is associated with current or anticipated ongoing medical care services related to a patient's single, serious condition or a complex condition.     4/8/2025    Scribe Attestation  By signing my name below, IVianney Scribe attest that this documentation has been prepared under the direction and in the  presence of Dr. Alfredito Lozada.

## 2025-04-09 ENCOUNTER — APPOINTMENT (OUTPATIENT)
Dept: HEMATOLOGY/ONCOLOGY | Facility: CLINIC | Age: 75
End: 2025-04-09
Payer: MEDICARE

## 2025-04-09 RX ORDER — SULFAMETHOXAZOLE AND TRIMETHOPRIM 800; 160 MG/1; MG/1
1 TABLET ORAL 2 TIMES DAILY
Qty: 10 TABLET | Refills: 0 | Status: SHIPPED | OUTPATIENT
Start: 2025-04-09 | End: 2025-04-14

## 2025-04-09 NOTE — PROGRESS NOTES
Patient ID: Aly Mccarty is a 75 y.o. male.    Cystoscopy    Date/Time: 4/9/2025 10:49 AM    Performed by: Alfredito Lozada MD MPH  Authorized by: Alfredito Lozada MD MPH    Procedure - Bladder Cystoscopy:     Procedure details: cystoscopy    PROCEDURE NOTE:    PREOPERATIVE DIAGNOSIS:  Neurogenic bladder, UTI, nocturia     POSTOPERATIVE DIAGNOSIS:  Same    OPERATION:  Flexible Cystourethroscopy      SURGEON:  Alfredito Lozada MD MPH    ANESTHESIA:  2%  lidocaine jelly    COMPLICATIONS:  None    EBL: Minimal    SPECIMEN:  Voided urine was collected and submitted for cx.    DISPOSITION:  The patient was discharged home after the procedure, per routine.    INDICATIONS: :  Mr. Mccarty is a 75 y.o. patient with a history of Neurogenic bladder, UTI, nocturia  who presents today for Cystoscopy.     The indications, risks and benefits of this procedure were discussed with the patient, consent was obtained prior to the procedure, and to the best of my judgement the patient seemed to understand and agree to the procedure.    PROCEDURE:  The patient  was brought into the procedure suite and informed consent was reviewed and confirmed. Vital signs were obtained prior to the procedure: /62   Pulse (!) 48 .  The patient was escorted onto the stretcher, placed supine, prepped with betadine and draped in the usual standard surgical fashion.  Intraurethral 2% viscous lidocaine jelly was used for local analgesia.  A 16 Nepali flexible cystourethroscope was inserted into the urethra.   The penile urethra was normal.  The prostate urethra was enlarged.  Upon entering the bladder the entire bladder was surveyed in a 360 degree fashion.  Extremely poor visibility. Grossly, There was no evidence of any bladder lesions, foreign objects, stones or evidence of any mucosal changes. Pt will need to repeat his cysto.   The patient tolerated the procedure well.  Vitals were stable after the procedure.  The patient was able to  void and was discharged home.  Verbal and written Post procedure instructions were reviewed with the patient.    IMPRESSION:  Poor visibility  Current UTI      PLAN:  Ucx  CIC 5 times daily

## 2025-04-10 ENCOUNTER — HOSPITAL ENCOUNTER (EMERGENCY)
Facility: HOSPITAL | Age: 75
Discharge: HOME | End: 2025-04-10
Attending: EMERGENCY MEDICINE
Payer: MEDICARE

## 2025-04-10 ENCOUNTER — APPOINTMENT (OUTPATIENT)
Dept: RADIOLOGY | Facility: HOSPITAL | Age: 75
End: 2025-04-10
Payer: MEDICARE

## 2025-04-10 VITALS
WEIGHT: 315 LBS | RESPIRATION RATE: 18 BRPM | DIASTOLIC BLOOD PRESSURE: 64 MMHG | SYSTOLIC BLOOD PRESSURE: 126 MMHG | TEMPERATURE: 97.9 F | OXYGEN SATURATION: 95 % | HEIGHT: 73 IN | HEART RATE: 60 BPM | BODY MASS INDEX: 41.75 KG/M2

## 2025-04-10 DIAGNOSIS — K59.00 CONSTIPATION, UNSPECIFIED CONSTIPATION TYPE: Primary | ICD-10-CM

## 2025-04-10 DIAGNOSIS — N39.0 URINARY TRACT INFECTION ASSOCIATED WITH INDWELLING URETHRAL CATHETER, INITIAL ENCOUNTER: ICD-10-CM

## 2025-04-10 DIAGNOSIS — T83.511A URINARY TRACT INFECTION ASSOCIATED WITH INDWELLING URETHRAL CATHETER, INITIAL ENCOUNTER: ICD-10-CM

## 2025-04-10 LAB
ALBUMIN SERPL BCP-MCNC: 4 G/DL (ref 3.4–5)
ALP SERPL-CCNC: 45 U/L (ref 33–136)
ALT SERPL W P-5'-P-CCNC: 13 U/L (ref 10–52)
ANION GAP SERPL CALC-SCNC: 12 MMOL/L (ref 10–20)
APPEARANCE UR: CLEAR
AST SERPL W P-5'-P-CCNC: 42 U/L (ref 9–39)
BACTERIA #/AREA URNS AUTO: ABNORMAL /HPF
BASOPHILS # BLD AUTO: 0.02 X10*3/UL (ref 0–0.1)
BASOPHILS NFR BLD AUTO: 0.4 %
BILIRUB SERPL-MCNC: 0.7 MG/DL (ref 0–1.2)
BILIRUB UR STRIP.AUTO-MCNC: NEGATIVE MG/DL
BUN SERPL-MCNC: 19 MG/DL (ref 6–23)
CALCIUM SERPL-MCNC: 8.5 MG/DL (ref 8.6–10.3)
CHLORIDE SERPL-SCNC: 101 MMOL/L (ref 98–107)
CO2 SERPL-SCNC: 25 MMOL/L (ref 21–32)
COLOR UR: YELLOW
CREAT SERPL-MCNC: 1.8 MG/DL (ref 0.5–1.3)
EGFRCR SERPLBLD CKD-EPI 2021: 39 ML/MIN/1.73M*2
EOSINOPHIL # BLD AUTO: 0.22 X10*3/UL (ref 0–0.4)
EOSINOPHIL NFR BLD AUTO: 4.2 %
ERYTHROCYTE [DISTWIDTH] IN BLOOD BY AUTOMATED COUNT: 13.9 % (ref 11.5–14.5)
GLUCOSE SERPL-MCNC: 106 MG/DL (ref 74–99)
GLUCOSE UR STRIP.AUTO-MCNC: NORMAL MG/DL
HCT VFR BLD AUTO: 40.4 % (ref 41–52)
HGB BLD-MCNC: 13.1 G/DL (ref 13.5–17.5)
IMM GRANULOCYTES # BLD AUTO: 0.01 X10*3/UL (ref 0–0.5)
IMM GRANULOCYTES NFR BLD AUTO: 0.2 % (ref 0–0.9)
KETONES UR STRIP.AUTO-MCNC: NEGATIVE MG/DL
LEUKOCYTE ESTERASE UR QL STRIP.AUTO: ABNORMAL
LIPASE SERPL-CCNC: 29 U/L (ref 9–82)
LYMPHOCYTES # BLD AUTO: 0.84 X10*3/UL (ref 0.8–3)
LYMPHOCYTES NFR BLD AUTO: 16.1 %
MCH RBC QN AUTO: 33.6 PG (ref 26–34)
MCHC RBC AUTO-ENTMCNC: 32.4 G/DL (ref 32–36)
MCV RBC AUTO: 104 FL (ref 80–100)
MONOCYTES # BLD AUTO: 0.26 X10*3/UL (ref 0.05–0.8)
MONOCYTES NFR BLD AUTO: 5 %
MUCOUS THREADS #/AREA URNS AUTO: ABNORMAL /LPF
NEUTROPHILS # BLD AUTO: 3.86 X10*3/UL (ref 1.6–5.5)
NEUTROPHILS NFR BLD AUTO: 74.1 %
NITRITE UR QL STRIP.AUTO: NEGATIVE
NRBC BLD-RTO: 0 /100 WBCS (ref 0–0)
PH UR STRIP.AUTO: 7 [PH]
PLATELET # BLD AUTO: 94 X10*3/UL (ref 150–450)
POTASSIUM SERPL-SCNC: 5.3 MMOL/L (ref 3.5–5.3)
PROT SERPL-MCNC: 7.2 G/DL (ref 6.4–8.2)
PROT UR STRIP.AUTO-MCNC: ABNORMAL MG/DL
RBC # BLD AUTO: 3.9 X10*6/UL (ref 4.5–5.9)
RBC # UR STRIP.AUTO: ABNORMAL MG/DL
RBC #/AREA URNS AUTO: >20 /HPF
SODIUM SERPL-SCNC: 133 MMOL/L (ref 136–145)
SP GR UR STRIP.AUTO: 1.03
SQUAMOUS #/AREA URNS AUTO: ABNORMAL /HPF
UROBILINOGEN UR STRIP.AUTO-MCNC: ABNORMAL MG/DL
WBC # BLD AUTO: 5.2 X10*3/UL (ref 4.4–11.3)
WBC #/AREA URNS AUTO: >50 /HPF
WBC CLUMPS #/AREA URNS AUTO: ABNORMAL /HPF
YEAST BUDDING #/AREA UR COMP ASSIST: PRESENT /HPF

## 2025-04-10 PROCEDURE — 87086 URINE CULTURE/COLONY COUNT: CPT | Mod: GEALAB

## 2025-04-10 PROCEDURE — 2500000005 HC RX 250 GENERAL PHARMACY W/O HCPCS

## 2025-04-10 PROCEDURE — 74177 CT ABD & PELVIS W/CONTRAST: CPT

## 2025-04-10 PROCEDURE — 85025 COMPLETE CBC W/AUTO DIFF WBC: CPT

## 2025-04-10 PROCEDURE — 36415 COLL VENOUS BLD VENIPUNCTURE: CPT

## 2025-04-10 PROCEDURE — 99285 EMERGENCY DEPT VISIT HI MDM: CPT | Mod: 25 | Performed by: EMERGENCY MEDICINE

## 2025-04-10 PROCEDURE — 96360 HYDRATION IV INFUSION INIT: CPT

## 2025-04-10 PROCEDURE — 74177 CT ABD & PELVIS W/CONTRAST: CPT | Performed by: RADIOLOGY

## 2025-04-10 PROCEDURE — 2500000004 HC RX 250 GENERAL PHARMACY W/ HCPCS (ALT 636 FOR OP/ED)

## 2025-04-10 PROCEDURE — 80053 COMPREHEN METABOLIC PANEL: CPT

## 2025-04-10 PROCEDURE — 51798 US URINE CAPACITY MEASURE: CPT

## 2025-04-10 PROCEDURE — 81001 URINALYSIS AUTO W/SCOPE: CPT

## 2025-04-10 PROCEDURE — 2550000001 HC RX 255 CONTRASTS

## 2025-04-10 PROCEDURE — 83690 ASSAY OF LIPASE: CPT

## 2025-04-10 RX ORDER — LIDOCAINE HYDROCHLORIDE 20 MG/ML
JELLY TOPICAL
Status: COMPLETED
Start: 2025-04-10 | End: 2025-04-10

## 2025-04-10 RX ORDER — POLYETHYLENE GLYCOL 3350 17 G/17G
17 POWDER, FOR SOLUTION ORAL DAILY
Qty: 3 PACKET | Refills: 0 | Status: SHIPPED | OUTPATIENT
Start: 2025-04-10 | End: 2025-04-13

## 2025-04-10 RX ORDER — AMOXICILLIN 250 MG
1 CAPSULE ORAL DAILY
Qty: 20 TABLET | Refills: 0 | Status: SHIPPED | OUTPATIENT
Start: 2025-04-10 | End: 2025-04-30

## 2025-04-10 RX ADMIN — LIDOCAINE HYDROCHLORIDE: 20 JELLY TOPICAL at 14:15

## 2025-04-10 RX ADMIN — IOHEXOL 74 ML: 350 INJECTION, SOLUTION INTRAVENOUS at 12:34

## 2025-04-10 RX ADMIN — SODIUM CHLORIDE, SODIUM LACTATE, POTASSIUM CHLORIDE, AND CALCIUM CHLORIDE 1000 ML: .6; .31; .03; .02 INJECTION, SOLUTION INTRAVENOUS at 12:48

## 2025-04-10 ASSESSMENT — COLUMBIA-SUICIDE SEVERITY RATING SCALE - C-SSRS
2. HAVE YOU ACTUALLY HAD ANY THOUGHTS OF KILLING YOURSELF?: NO
1. IN THE PAST MONTH, HAVE YOU WISHED YOU WERE DEAD OR WISHED YOU COULD GO TO SLEEP AND NOT WAKE UP?: NO
6. HAVE YOU EVER DONE ANYTHING, STARTED TO DO ANYTHING, OR PREPARED TO DO ANYTHING TO END YOUR LIFE?: NO

## 2025-04-10 ASSESSMENT — PAIN SCALES - GENERAL
PAINLEVEL_OUTOF10: 0 - NO PAIN

## 2025-04-10 ASSESSMENT — PAIN - FUNCTIONAL ASSESSMENT
PAIN_FUNCTIONAL_ASSESSMENT: 0-10
PAIN_FUNCTIONAL_ASSESSMENT: 0-10

## 2025-04-10 ASSESSMENT — LIFESTYLE VARIABLES
HAVE YOU EVER FELT YOU SHOULD CUT DOWN ON YOUR DRINKING: NO
EVER HAD A DRINK FIRST THING IN THE MORNING TO STEADY YOUR NERVES TO GET RID OF A HANGOVER: NO
TOTAL SCORE: 0
EVER FELT BAD OR GUILTY ABOUT YOUR DRINKING: NO
HAVE PEOPLE ANNOYED YOU BY CRITICIZING YOUR DRINKING: NO

## 2025-04-10 NOTE — DISCHARGE INSTRUCTIONS
You have been evaluated in the Emergency Department today for abdominal pain.  Please increase your MiraLAX to 3 times a day.  Please titrate to a bowel movement.  Can also takes senna.  Please follow-up with urologist for the UTI.  Please continue the Bactrim    Please follow up with your primary care physician within two days. If you do not have a primary care doctor you may call [phone] to make an appointment.     Return to the Emergency Department if you develop any new or worsening symptoms.   Thank you for choosing us for your care.

## 2025-04-10 NOTE — ED PROVIDER NOTES
History of Present Illness     History provided by: Patient and Family Member  Limitations to History: None  External Records Reviewed with Brief Summary: Outpatient progress note from 3/19/2025 which showed visit for pancytopenia    HPI:  Aly Mccarty is a 75 y.o. male With a past medical history of hypertension, aortic stenosis, A-fib on Xarelto, GERD, indwelling Mendoza who is presenting today with abdominal pain.  Patient reports that he has not had a bowel movement in about a week.  He has been taking MiraLAX twice daily and enema.  Denying nausea vomiting.  Reports multiple abdominal surgeries.  Reports a history of a hernia that was never repaired.  Reports that he is able to pass gas.  And 1 episode of diarrhea.    Physical Exam   Triage vitals:  T 36.1 °C (97 °F)  HR 60  /86  RR 17  O2 96 % None (Room air)    General: Awake, alert, in no acute distress  Eyes: Gaze conjugate.  No scleral icterus or injection  HENT: Normo-cephalic, atraumatic. No stridor  CV: Regular rate, regular rhythm. Radial pulses 2+ bilaterally  Resp: Breathing non-labored, speaking in full sentences.  GI: Soft, distended, non-tender. No rebound or guarding.  Umbilical hernia is reducible  MSK/Extremities: No gross bony deformities. Moving all extremities  Skin: Warm. Appropriate color  Neuro: Alert. Oriented. Face symmetric. Speech is fluent.  Gross strength and sensation intact in b/l UE and LEs  Psych: Appropriate mood and affect      Medical Decision Making & ED Course   Medical Decision Makin y.o. male  With a past medical history of hypertension, aortic stenosis, A-fib on Xarelto, GERD, indwelling Mendoza who is presenting today with abdominal pain.  Vital signs within normal limits.  On exam, patient has an umbilical hernia that is reducible.  Abdomen is distended but nontender.  Has a history of multiple abdominal surgeries, concerning for a partial SBO.  However believe that a constipation is more likely given  patient is able to pass gas.  Will obtain a CT scan of the abdomen to rule out a partial SBO.  Will also obtain lab work including a CBC CMP and lipase to check for electrolyte derangement.  Patient was offered pain medication but declined. Please see ed course for interpretation of labs and further ed course  ----      Differential diagnoses considered include but are not limited to: Please see MDM.     Social Determinants of Health which Significantly Impact Care: None identified     EKG Independent Interpretation: If an EKG interpretation is available. Please see ED Course and MDM for full interpretation.    Independent Result Review and Interpretation: Results were independently reviewed and interpreted by myself. Please see ED course and MDM for full interpretation.    Chronic conditions affecting the patient's care: As documented in the MDM    The patient was discussed with the following consultants/services: None    Care Considerations: As per MDM    ED Course:  ED Course as of 04/11/25 0818   Thu Apr 10, 2025   1224 Creatinine(!): 1.80  At baseline [HELDER]   1318 Platelets(!): 94  Chronic thrombocytopenia  [HELDER]   1422 CT scan showed concern for cystitis. [HELDER]   1432 Will exchange the Mendoza.  After further discussion with the patient he was already being treated for a UTI by his urologist.  Patient will be discharged home and advised to continue the Bactrim. [HELDER]   1433 Patient was advised to adjust his bowel regiment with increasing the amount of MiraLAX, and senna.. [HELDER]      ED Course User Index  [HELDER] Madhavi Bradford MD         Diagnoses as of 04/11/25 0818   Constipation, unspecified constipation type   Urinary tract infection associated with indwelling urethral catheter, initial encounter     Disposition   As a result of the work-up, the patient was discharged home.  he was informed of his diagnosis and instructed to come back with any concerns or worsening of condition.  he and was agreeable to the plan as  discussed above.  he was given the opportunity to ask questions.  All of the patient's questions were answered.    Procedures   Procedures    Patient seen and discussed with ED attending physician.    Madhavi Bradford MD  Emergency Medicine     Madhavi Bradford MD  Resident  04/11/25 0828

## 2025-04-11 LAB
BACTERIA UR CULT: ABNORMAL
BACTERIA UR CULT: NORMAL
HOLD SPECIMEN: NORMAL

## 2025-04-18 DIAGNOSIS — D63.1 ANEMIA DUE TO STAGE 4 CHRONIC KIDNEY DISEASE: ICD-10-CM

## 2025-04-18 DIAGNOSIS — N18.4 ANEMIA DUE TO STAGE 4 CHRONIC KIDNEY DISEASE: ICD-10-CM

## 2025-04-18 DIAGNOSIS — N28.9 KIDNEY DISEASE: ICD-10-CM

## 2025-04-21 ENCOUNTER — LAB (OUTPATIENT)
Dept: LAB | Facility: HOSPITAL | Age: 75
End: 2025-04-21
Payer: MEDICARE

## 2025-04-21 DIAGNOSIS — N18.4 CHRONIC KIDNEY DISEASE, STAGE 4 (SEVERE) (MULTI): Primary | ICD-10-CM

## 2025-04-21 LAB
BASOPHILS # BLD AUTO: 0.02 X10*3/UL (ref 0–0.1)
BASOPHILS NFR BLD AUTO: 0.5 %
EOSINOPHIL # BLD AUTO: 0.15 X10*3/UL (ref 0–0.4)
EOSINOPHIL NFR BLD AUTO: 3.4 %
ERYTHROCYTE [DISTWIDTH] IN BLOOD BY AUTOMATED COUNT: 13.4 % (ref 11.5–14.5)
HCT VFR BLD AUTO: 40.5 % (ref 41–52)
HGB BLD-MCNC: 13.4 G/DL (ref 13.5–17.5)
IMM GRANULOCYTES # BLD AUTO: 0.02 X10*3/UL (ref 0–0.5)
IMM GRANULOCYTES NFR BLD AUTO: 0.5 % (ref 0–0.9)
LYMPHOCYTES # BLD AUTO: 0.92 X10*3/UL (ref 0.8–3)
LYMPHOCYTES NFR BLD AUTO: 21.1 %
MCH RBC QN AUTO: 33.7 PG (ref 26–34)
MCHC RBC AUTO-ENTMCNC: 33.1 G/DL (ref 32–36)
MCV RBC AUTO: 102 FL (ref 80–100)
MONOCYTES # BLD AUTO: 0.25 X10*3/UL (ref 0.05–0.8)
MONOCYTES NFR BLD AUTO: 5.7 %
NEUTROPHILS # BLD AUTO: 2.99 X10*3/UL (ref 1.6–5.5)
NEUTROPHILS NFR BLD AUTO: 68.8 %
NRBC BLD-RTO: 0 /100 WBCS (ref 0–0)
PLATELET # BLD AUTO: 101 X10*3/UL (ref 150–450)
RBC # BLD AUTO: 3.98 X10*6/UL (ref 4.5–5.9)
WBC # BLD AUTO: 4.4 X10*3/UL (ref 4.4–11.3)

## 2025-04-21 PROCEDURE — 85025 COMPLETE CBC W/AUTO DIFF WBC: CPT

## 2025-04-22 ENCOUNTER — APPOINTMENT (OUTPATIENT)
Dept: NEPHROLOGY | Facility: CLINIC | Age: 75
End: 2025-04-22
Payer: MEDICARE

## 2025-04-24 ENCOUNTER — APPOINTMENT (OUTPATIENT)
Dept: HEMATOLOGY/ONCOLOGY | Facility: CLINIC | Age: 75
End: 2025-04-24
Payer: MEDICARE

## 2025-04-30 ENCOUNTER — APPOINTMENT (OUTPATIENT)
Dept: HEMATOLOGY/ONCOLOGY | Facility: CLINIC | Age: 75
End: 2025-04-30
Payer: MEDICARE

## 2025-05-06 ENCOUNTER — APPOINTMENT (OUTPATIENT)
Dept: UROLOGY | Facility: HOSPITAL | Age: 75
End: 2025-05-06
Payer: MEDICARE

## 2025-05-08 ENCOUNTER — APPOINTMENT (OUTPATIENT)
Dept: NEPHROLOGY | Facility: CLINIC | Age: 75
End: 2025-05-08
Payer: MEDICARE

## 2025-05-08 VITALS
DIASTOLIC BLOOD PRESSURE: 72 MMHG | BODY MASS INDEX: 41.75 KG/M2 | TEMPERATURE: 97.3 F | SYSTOLIC BLOOD PRESSURE: 152 MMHG | OXYGEN SATURATION: 97 % | HEART RATE: 53 BPM | HEIGHT: 73 IN | WEIGHT: 315 LBS

## 2025-05-08 DIAGNOSIS — M10.00 IDIOPATHIC GOUT, UNSPECIFIED CHRONICITY, UNSPECIFIED SITE: Primary | ICD-10-CM

## 2025-05-08 DIAGNOSIS — N28.9 KIDNEY DISEASE: ICD-10-CM

## 2025-05-08 DIAGNOSIS — N18.32 CHRONIC KIDNEY DISEASE, STAGE 3B (MULTI): ICD-10-CM

## 2025-05-08 DIAGNOSIS — I12.9 HYPERTENSIVE CHRONIC KIDNEY DISEASE WITH STAGE 1 THROUGH STAGE 4 CHRONIC KIDNEY DISEASE, OR UNSPECIFIED CHRONIC KIDNEY DISEASE: ICD-10-CM

## 2025-05-08 DIAGNOSIS — E87.20 METABOLIC ACIDOSIS: ICD-10-CM

## 2025-05-08 PROCEDURE — G2211 COMPLEX E/M VISIT ADD ON: HCPCS | Performed by: INTERNAL MEDICINE

## 2025-05-08 PROCEDURE — 99214 OFFICE O/P EST MOD 30 MIN: CPT | Performed by: INTERNAL MEDICINE

## 2025-05-08 PROCEDURE — 3078F DIAST BP <80 MM HG: CPT | Performed by: INTERNAL MEDICINE

## 2025-05-08 PROCEDURE — 3077F SYST BP >= 140 MM HG: CPT | Performed by: INTERNAL MEDICINE

## 2025-05-08 PROCEDURE — 1159F MED LIST DOCD IN RCRD: CPT | Performed by: INTERNAL MEDICINE

## 2025-05-08 PROCEDURE — 3008F BODY MASS INDEX DOCD: CPT | Performed by: INTERNAL MEDICINE

## 2025-05-08 RX ORDER — SODIUM BICARBONATE 650 MG/1
650 TABLET ORAL 2 TIMES DAILY
Qty: 60 TABLET | Refills: 11 | Status: SHIPPED | OUTPATIENT
Start: 2025-05-08 | End: 2026-05-08

## 2025-05-08 NOTE — PATIENT INSTRUCTIONS
Dear Aly it was nice meeting you nephrology clinic today-discussed the following    # Chronic kidney disease stage III baseline kidney function 30-40%.  Currently function is at baseline 34%.  No objection for hernia repair surgery from kidney standpoint    # Hypertension-in good control.  Continue carvedilol 12.5 mg twice daily, furosemide/Lasix 40 mg, and hydralazine 50 mg twice daily.  Lisinopril is on hold.  Continue same.      # Anemia/pancytopenia-continue to follow the blood doctor.  Less likely kidney related    # Possible gout-continue Uloric 40 mg.  Monitor uric acid    # Chronic urine retention/chronic UTI-continue to follow with urology.  Currently Mendoza catheter with plan for suprapubic catheter insertion        Follow-up in 6 months with repeat blood work and urinalysis prior to next visit    Milli Mott MD, MS, ANNIKA CHOI   Clinical  - [facility] [employer]   Nephrologist - [facility]

## 2025-05-08 NOTE — PROGRESS NOTES
"Subjective       Aly Mccarty is a 75 y.o. male who has past medical history of pancytopenia, chronic UTI, chronic urine retention status post self-catheterization, aortic valve stenosis status post TAVR, hypertension, coronary artery disease, chronic kidney disease was coming to see me for CKD follow-up    Last office visit October 2024.  Patient came today with his wife Sole.  Hernia repair surgery was postponed-he is requesting clearance today from kidney standpoint.  No kidney recent complaints or concerns.  He did not start sodium bicarbonate as instructed last office visit-will start.  Repeat blood work showed improved serum creatinine 2 from prior 2.4 and GFR 34 from prior 29.  Acid level remains significant with bicarbonate 17-agreeable to start sodium bicarbonate.  With normal active lites.  He continues to be on Uloric-no reports of gout flare.  He continues to retain urine-currently with Mendoza catheter insertion with plan to add suprapubic catheter with urology.  Blood pressure is accepted today    Prior notes    Aly was accompanied by his wife Sole today. Last office visit was in April 2024. Since the last office visit, he had an acute exacerbation of CHF. To reduce lower leg and pulmonary edema, he was changed from hydrochlorothiazide to furosemide 40 mg once daily. After starting on the water pill, he reports a resolution of fluid overload symptoms. He was set to undergo an umbilical hernia repair in August 2024, but the procedure was cancelled after his labs showed a most recent serum creatinine of 2.34 and eGFR of 29. This is slightly below his baseline. He hopes to be cleared for this surgery.     Prior Notes:     Patient came today with his wife \"Sole \".  No continued complaints or concerns.  He follows with urology for chronic urinary retention.  He self catheterize himself 1-twice daily.  He checks blood pressure at home and average reading 120//80.  Blood pressure is in " "target today.  He has chronic kidney disease for many years.  He had kidney failure 6-7 years ago that he was on dialysis while inpatient.  He tries to follow low-salt diet.  No significant NSAID use at home.  He reports possible gout flares.  No nephrolithiasis    Social history: , remote history of smoking, occasional marijuana  Surgical history: TAVR  Family history: Irrelevant      Objective   /72 (BP Location: Left arm, Patient Position: Sitting, BP Cuff Size: Large adult)   Pulse 53   Temp 36.3 °C (97.3 °F)   Ht 1.854 m (6' 1\")   Wt (!) 151 kg (333 lb)   SpO2 97%   BMI 43.93 kg/m²   Wt Readings from Last 3 Encounters:   05/08/25 (!) 151 kg (333 lb)   04/10/25 (!) 152 kg (335 lb)   03/19/25 (!) 155 kg (340 lb 15.1 oz)       Physical Exam    General appearance: no distress awake and alert on room air, morbidly obese  Eyes: non-icteric  HEENT: atrumatic head, PEERLA, moist mucosa  Skin: no apparent rash  Heart: NSR, S1, S2 normal, no murmur or gallop  Lungs: Symmetrical expansion,CTA bilat no wheezing/crackles  Abdomen: soft, nt/nd, obese  Extremities: Chronic leg swelling  Neuro: No FND,asterixis, no focal deficits noticed        Review of Systems     Constitutional: no fever, no chills, no recent weight gain and no recent weight loss.   Eyes: no blurred vision and no diplopia.   ENT: no hearing loss, no earache, no sore throat, no swollen glands in the neck and no nasal discharge.   Cardiovascular: no chest pain, no palpitations and no lower extremity edema.   Respiratory: no shortness of breath, no chronic cough and no shortness of breath during exertion.   Gastrointestinal: no abdominal pain, no constipation, no heartburn, no vomiting, no bloody stools and no change in bowel movements.   Genitourinary: no dysuria and no hematuria.   Musculoskeletal: no arthralgias and no myalgias.   Skin: no rashes and no skin lesions.   Neurological: no headaches and no dizziness.   Psychiatric: no " "confusion, no depression and no anxiety.   Endocrine: no heat intolerance, no cold intolerance, appetite not increased, no thyroid disorder, no increased urinary frequency and no dry skin.   Hematologic/Lymphatic: does not bleed easily and does not bruise easily.   All other systems have been reviewed and are negative for complaint.         Data Review                     Lab Results   Component Value Date    URICACID 8.0 (H) 05/02/2024           Lab Results   Component Value Date    HGBA1C 4.8 07/12/2023                 No lab exists for component: \"CR\", \"PHOSPHORUS\"        Albumin/Creatinine Ratio   Date Value Ref Range Status   06/07/2024 147.8 (H) <30.0 ug/mg Creat Final            RFP  Recent Labs     04/10/25  1145 03/19/25  1333 11/20/24  1159 10/23/24  1131 08/14/24  1026 07/17/24  1112 07/03/24  0642 07/02/24  0653 07/01/24  0659 05/02/24  1037 11/30/23  1017 08/24/23  1315 11/29/22  0947 11/23/22  0519   * 137 134* 135* 131* 138 145 144   < > 133*   < > 128*   < > 140   K 5.3 3.9 4.0 4.0 4.6 3.6 3.8 3.6   < > 5.0   < > 4.2   < > 4.0    103 101 103 104 103 106 107   < > 103   < > 96*   < > 107   CO2 25 17* 23 22 20* 26 33* 31   < > 18*   < > 22   < > 25   BUN 19 25* 29* 32* 49* 18 17 18   < > 36*   < > 36*   < > 17   CREATININE 1.80* 2.02* 1.87* 1.78* 2.34* 1.50* 1.25 1.25   < > 1.87*   < > 1.89*   < > 1.40*   GLUCOSE 106* 90 94 95 104* 103* 95 89   < > 119*   < > 105*   < > 96   CALCIUM 8.5* 8.8 9.1 9.0 8.6 9.0 8.5* 8.5*   < > 9.0   < > 9.0   < > 8.7   PHOS  --   --   --  3.7 4.5  --  3.0 3.7  --  3.7  --  2.7  --  2.9   EGFR 39* 34* 37* 40* 29* 49* 61 61   < > 37*   < >  --   --   --    ANIONGAP 12 21* 14 14 12 13 10 10   < > 17   < > 14   < > 12    < > = values in this interval not displayed.        Urineanalysis  Recent Labs     04/10/25  1416 09/10/24  1039 06/10/24  1444 02/07/24  1523 12/09/22  1157 11/04/22  1115 10/28/22  1100 05/04/20  1246 05/15/19  1000 04/01/19  1100 02/12/19  1032 " 01/09/19  0942 12/20/18  1715 07/11/18  1050   COLORU Yellow Dark-Yellow Dark-Yellow Orange* YELLOW YELLOW KIM YELLOW KIM YELLOW RED YELLOW YELLOW RED   APPEARANCEU Clear Turbid* Ex.Turbid* Clear CLEAR HAZY HAZY HAZY HAZY HAZY CLOUDY HAZY HAZY TURBID   SPECGRAVU 1.027 1.014 1.015 1.020 1.010 1.004* 1.008 1.006 1.009 1.009 1.010 1.005 1.008 1.005   HUGH 7.0 5.5 5.5 5.5 6.5 7.0 6.0 7.0 6.0 6.0 6.0 6.0 5.0 7.0   PROTUR 10 (TRACE) 20 (TRACE) 30 (1+)* 30 (1+) NEGATIVE NEGATIVE 30(1+)* NEGATIVE NEGATIVE NEGATIVE 100 (2+)* NEGATIVE NEGATIVE >=500(3+)*   GLUCOSEU Normal Normal Normal 100 (1+)* NEGATIVE NEGATIVE NEGATIVE NEGATIVE NEGATIVE NEGATIVE NEGATIVE NEGATIVE NEGATIVE NEGATIVE   BLOODU 0.1 (1+)* NEGATIVE 0.5 (2+)* LARGE (3+)* TRACE* MODERATE (2+)* MODERATE(2+)* SMALL(1+)* LARGE(3+)* SMALL(1+)* LARGE (3+)* SMALL(1+)* MODERATE(2+)* LARGE(3+)*   KETONESU NEGATIVE NEGATIVE NEGATIVE NEGATIVE NEGATIVE NEGATIVE NEGATIVE NEGATIVE NEGATIVE NEGATIVE NEGATIVE NEGATIVE NEGATIVE NEGATIVE   BILIRUBINU NEGATIVE NEGATIVE NEGATIVE NEGATIVE NEGATIVE NEGATIVE NEGATIVE NEGATIVE NEGATIVE NEGATIVE NEGATIVE NEGATIVE NEGATIVE NEGATIVE   NITRITEU NEGATIVE NEGATIVE 1+* POSITIVE* NEGATIVE NEGATIVE NEGATIVE POSITIVE* POSITIVE* NEGATIVE NEGATIVE NEGATIVE NEGATIVE NEGATIVE   LEUKOCYTESU 500 Meron/uL* 500 Meron/µL* 500 Meron/µL*  --  LARGE (3+)* LARGE (3+)* LARGE(3+)* LARGE(3+)* LARGE(3+)* LARGE(3+)* LARGE (3+)* LARGE(3+)* LARGE(3+)* LARGE(3+)*       Urine Electrolytes  Recent Labs     04/10/25  1416 09/10/24  1039 06/10/24  1444 06/07/24  1148 02/07/24  1523 12/09/22  1157 11/04/22  1115 10/28/22  1100 05/04/20  1246 05/15/19  1000 04/01/19  1100 02/12/19  1032 01/09/19  0942 12/20/18  1715 07/11/18  1050   NIKOLEU  --   --   --  38.7  --   --   --   --   --   --   --   --   --   --   --    PROTUR 10 (TRACE) 20 (TRACE) 30 (1+)*  --  30 (1+) NEGATIVE NEGATIVE 30(1+)* NEGATIVE NEGATIVE NEGATIVE 100 (2+)* NEGATIVE NEGATIVE >=500(3+)*   ALBUMINUR  --   --    --  57.2  --   --   --   --   --   --   --   --   --   --   --    MICROALBCREA  --   --   --  147.8*  --   --   --   --   --   --   --   --   --   --   --         Urine Micro  Recent Labs     04/10/25  1416 09/10/24  1039 06/10/24  1444 12/09/22  1157 11/12/22  0800 11/04/22  1115 10/28/22  1100 05/04/20  1246 05/15/19  1000 04/01/19  1100 02/12/19  1032 01/09/19  0942 12/20/18  1715 07/11/18  1050 11/30/17  1400 10/06/17  0400   WBCU >50* >50* >50* 55* 115* 35* >182* >182* >182* >182* 93* >182* >182* * >182* >100*   RBCU >20* 1-2 >20* <1 3 5 25* 12* 111* 18* >182* 14* 7* >100* 2 0-5   SQUAMEPIU 1-9 (SPARSE)  --   --  <1  --   --  3 <1 4  --  6 <1  --   --  <1  --    BACTERIAU 1+* 1+* 2+* 1+* 3+* 4+* 2+* 3+* 4+* 1+*  --  1+* 1+*  --   --  3+*   MUCUSU FEW FEW FEW FEW 2+ 4+ 1+ 2+ FEW  --   --   --   --   --   --   --         Iron  Recent Labs     03/19/25  1333 02/05/25  1317 06/19/24  1156 05/02/24  1037 02/29/24  1014 11/30/23  1017 08/24/23  1328 08/24/23  1315   IRON 97 98 71 88 144 137 CANCELED 119   TIBC 332 348 338 288 332 347 CANCELED 300   IRONSAT 29 28 21* 31 43 39 CANCELED 40   FERRITIN 54 61 117 248 185 104 CANCELED 137          Current Outpatient Medications on File Prior to Visit   Medication Sig Dispense Refill    acetaminophen (Tylenol) 500 mg tablet Take 1-2 by mouth every 6 hours as needed for pain      carvedilol (Coreg) 25 mg tablet Take 1 tablet (25 mg) by mouth 2 times daily (morning and late afternoon). Take with food. (Patient taking differently: Take 0.5 tablets (12.5 mg) by mouth 2 times daily (morning and late afternoon). Take with food.) 180 tablet 3    cetirizine (ZyrTEC) 10 mg tablet Take 1 tablet (10 mg) by mouth once daily in the evening.      febuxostat (Uloric) 40 mg tablet Take 1 tablet (40 mg) by mouth once daily. 30 tablet 11    furosemide (Lasix) 40 mg tablet Take 1 tablet (40 mg) by mouth once daily. 90 tablet 3    hydrALAZINE (Apresoline) 50 mg tablet Take 1 tablet (50  mg) by mouth 2 times a day. 180 tablet 3    ketoconazole (NIZOral) 2 % cream APPLY TO AFFECTED AREA EVERY MORNING TILL CLEAR, REPEAT AS NEEDED. (Patient taking differently: if needed for rash.)      levothyroxine (Synthroid, Levoxyl) 88 mcg tablet TAKE 1 TABLET BY MOUTH EVERY DAY ON EMPTY STOMACH 90 tablet 1    predniSONE (Deltasone) 10 mg tablet if needed.      Ghazal-Chi Rx 1- mg-mg-mcg tablet TAKE 1 TABLET BY MOUTH EVERY DAY WITH FOOD 90 tablet 3    Xarelto 20 mg tablet Take 1 tablet (20 mg) by mouth once daily in the evening. Take with meals. 90 tablet 3    [] allopurinol (Zyloprim) 100 mg tablet Take 1 tablet (100 mg) by mouth once daily. 30 tablet 11    phenazopyridine (Pyridium) 200 mg tablet Take 1 tablet (200 mg) by mouth 3 times daily (morning, midday, late afternoon). (Patient not taking: Reported on 2025)      tamsulosin (Flomax) 0.4 mg 24 hr capsule TAKE 1 CAPSULE BY MOUTH ONCE DAILY. (Patient not taking: Reported on 2025) 90 capsule 0     No current facility-administered medications on file prior to visit.           Assessment and Plan       Aly Mccarty  is a 75 y.o. male who has past medical history of  pancytopenia, chronic UTI, chronic urine retention status post self-catheterization, aortic valve stenosis status post TAVR, hypertension, coronary artery disease, chronic kidney disease was coming to see me for CKD follow-up management per PCP    Impression    # Chronic kidney disease -G3 B/A2-possible atherosclerotic cardiovascular disease/prior acute kidney injury  -Patient was on dialysis in the past as an inpatient in the setting of sepsis  -Baseline serum creatinine 1.8-2.2, GFR 30-40 mill per minute per 1.73 m²-currently stable.  - Kidney ultrasound done 2024 was reviewed and showed within normal kidneys bilateral with no masses or hydronephrosis  -Currently resting better than on hold-I see no immediate medication to resume.  Will consider follow-up  - Patient  is stable from kidney standpoint to proceed with hernia repair surgery    # Chronic mild hyponatremia sodium is now normal    # Mild Acidosis  - Discussed with patient that the most recent blood work showed low levels of bicarbonate  - Will start on sodium bicarbonate twice daily     # BP - today at office is in target with negative orthostatic hypotension  -Current medication carvedilol 12.5 mg twice daily, furosemide 40 mg, hydralazine 50 mg twice daily. Lisinopril is on hold.  -No changes    #Bradycardia-on carvedilol  - Discussed with patient that his heart rate was still low despite dropping from 25 mg carvedilol to 12.5 mg carvedilol. Follow up with cardiology to potentially drop to 6.25 mg daily.       #Anemia/pancytopenia-less likely CKD related.  Most likely bone marrow related.  Continue to follow with hematology.  Will consider MEENA as needed-no immediate indication    #(CKD)-MBD  -Within normal calcium, phosphorus, albumin.  Recheck PTH and vitamin D    # CVS  -Aortic valve stenosis status post TAVR  -Currently is not on statins or SGL 2 inhibitors    # No history of diabetes    # Chronic urine retention-he follows with urology.  Currently has Mendoza catheter in place with plan for suprapubic catheter insertion.       # Possible history of recurrent gout-will monitor uric acid.    - Failed allopurinol  -Continue Uloric (febuxustat) 40 mg daily    #Others  - No NSAIDs, no contrast as possible. If to be done- we recommend holding ACEi/ARBS/diuretics 24 hrs prior to contrast exposure and ensure appropriate hydration   - Ensure well hydration  - Limit salt in diet  - No smoking    Patient received CKD education and counselling    Follow-up in 6 months with renal order contrasted prior to next visit        Milli Mott MD, MS, ANNIKA CHOI   Clinical  - [employer]   Nephrologist - [facility] - [facility]

## 2025-05-12 ENCOUNTER — PATIENT MESSAGE (OUTPATIENT)
Dept: UROLOGY | Facility: HOSPITAL | Age: 75
End: 2025-05-12
Payer: MEDICARE

## 2025-05-13 ENCOUNTER — TELEPHONE (OUTPATIENT)
Dept: UROLOGY | Facility: HOSPITAL | Age: 75
End: 2025-05-13
Payer: MEDICARE

## 2025-05-13 DIAGNOSIS — N31.9 NEUROGENIC BLADDER: Primary | ICD-10-CM

## 2025-05-13 NOTE — TELEPHONE ENCOUNTER
Spoke with patient's wife to let her know IR will reach out to them regarding the consult for the super pubic cath.

## 2025-05-19 ENCOUNTER — LAB (OUTPATIENT)
Dept: LAB | Facility: HOSPITAL | Age: 75
End: 2025-05-19
Payer: MEDICARE

## 2025-05-19 DIAGNOSIS — N28.9 KIDNEY DISEASE: ICD-10-CM

## 2025-05-19 DIAGNOSIS — N18.4 CHRONIC KIDNEY DISEASE, STAGE 4 (SEVERE) (MULTI): Primary | ICD-10-CM

## 2025-05-19 DIAGNOSIS — D63.1 ANEMIA DUE TO STAGE 4 CHRONIC KIDNEY DISEASE: Primary | ICD-10-CM

## 2025-05-19 DIAGNOSIS — N18.4 ANEMIA DUE TO STAGE 4 CHRONIC KIDNEY DISEASE: Primary | ICD-10-CM

## 2025-05-19 LAB
BASOPHILS # BLD AUTO: 0.03 X10*3/UL (ref 0–0.1)
BASOPHILS NFR BLD AUTO: 0.7 %
EOSINOPHIL # BLD AUTO: 0.25 X10*3/UL (ref 0–0.4)
EOSINOPHIL NFR BLD AUTO: 5.7 %
ERYTHROCYTE [DISTWIDTH] IN BLOOD BY AUTOMATED COUNT: 13.2 % (ref 11.5–14.5)
HCT VFR BLD AUTO: 42.3 % (ref 41–52)
HGB BLD-MCNC: 13.6 G/DL (ref 13.5–17.5)
IMM GRANULOCYTES # BLD AUTO: 0.02 X10*3/UL (ref 0–0.5)
IMM GRANULOCYTES NFR BLD AUTO: 0.5 % (ref 0–0.9)
LYMPHOCYTES # BLD AUTO: 1.06 X10*3/UL (ref 0.8–3)
LYMPHOCYTES NFR BLD AUTO: 24 %
MCH RBC QN AUTO: 32.5 PG (ref 26–34)
MCHC RBC AUTO-ENTMCNC: 32.2 G/DL (ref 32–36)
MCV RBC AUTO: 101 FL (ref 80–100)
MONOCYTES # BLD AUTO: 0.34 X10*3/UL (ref 0.05–0.8)
MONOCYTES NFR BLD AUTO: 7.7 %
NEUTROPHILS # BLD AUTO: 2.72 X10*3/UL (ref 1.6–5.5)
NEUTROPHILS NFR BLD AUTO: 61.4 %
NRBC BLD-RTO: 0 /100 WBCS (ref 0–0)
PLATELET # BLD AUTO: 118 X10*3/UL (ref 150–450)
RBC # BLD AUTO: 4.19 X10*6/UL (ref 4.5–5.9)
WBC # BLD AUTO: 4.4 X10*3/UL (ref 4.4–11.3)

## 2025-05-19 PROCEDURE — 36415 COLL VENOUS BLD VENIPUNCTURE: CPT

## 2025-05-19 PROCEDURE — 85025 COMPLETE CBC W/AUTO DIFF WBC: CPT

## 2025-05-22 ENCOUNTER — TELEPHONE (OUTPATIENT)
Dept: HEMATOLOGY/ONCOLOGY | Facility: CLINIC | Age: 75
End: 2025-05-22
Payer: MEDICARE

## 2025-05-22 ENCOUNTER — APPOINTMENT (OUTPATIENT)
Dept: HEMATOLOGY/ONCOLOGY | Facility: CLINIC | Age: 75
End: 2025-05-22
Payer: MEDICARE

## 2025-05-30 ENCOUNTER — HOSPITAL ENCOUNTER (OUTPATIENT)
Dept: RADIOLOGY | Facility: HOSPITAL | Age: 75
Discharge: HOME | End: 2025-05-30
Payer: MEDICARE

## 2025-05-30 ENCOUNTER — HOSPITAL ENCOUNTER (OUTPATIENT)
Dept: CARDIOLOGY | Facility: HOSPITAL | Age: 75
Discharge: HOME | End: 2025-05-30
Payer: MEDICARE

## 2025-05-30 VITALS
TEMPERATURE: 97.2 F | RESPIRATION RATE: 16 BRPM | HEART RATE: 53 BPM | DIASTOLIC BLOOD PRESSURE: 73 MMHG | OXYGEN SATURATION: 96 % | SYSTOLIC BLOOD PRESSURE: 164 MMHG

## 2025-05-30 DIAGNOSIS — N31.9 NEUROGENIC BLADDER: ICD-10-CM

## 2025-05-30 DIAGNOSIS — I10 ESSENTIAL (PRIMARY) HYPERTENSION: ICD-10-CM

## 2025-05-30 PROCEDURE — 2500000004 HC RX 250 GENERAL PHARMACY W/ HCPCS (ALT 636 FOR OP/ED): Mod: JZ | Performed by: NURSE PRACTITIONER

## 2025-05-30 PROCEDURE — 99222 1ST HOSP IP/OBS MODERATE 55: CPT | Performed by: NURSE PRACTITIONER

## 2025-05-30 PROCEDURE — 7100000009 HC PHASE TWO TIME - INITIAL BASE CHARGE

## 2025-05-30 PROCEDURE — 76942 ECHO GUIDE FOR BIOPSY: CPT

## 2025-05-30 PROCEDURE — C1729 CATH, DRAINAGE: HCPCS

## 2025-05-30 PROCEDURE — C1894 INTRO/SHEATH, NON-LASER: HCPCS

## 2025-05-30 PROCEDURE — 99152 MOD SED SAME PHYS/QHP 5/>YRS: CPT | Performed by: RADIOLOGY

## 2025-05-30 PROCEDURE — 99152 MOD SED SAME PHYS/QHP 5/>YRS: CPT

## 2025-05-30 PROCEDURE — C1769 GUIDE WIRE: HCPCS

## 2025-05-30 PROCEDURE — 2500000005 HC RX 250 GENERAL PHARMACY W/O HCPCS: Performed by: RADIOLOGY

## 2025-05-30 PROCEDURE — 2720000007 HC OR 272 NO HCPCS

## 2025-05-30 PROCEDURE — 51102 DRAIN BL W/CATH INSERTION: CPT | Performed by: RADIOLOGY

## 2025-05-30 PROCEDURE — 7100000010 HC PHASE TWO TIME - EACH INCREMENTAL 1 MINUTE

## 2025-05-30 PROCEDURE — 2500000004 HC RX 250 GENERAL PHARMACY W/ HCPCS (ALT 636 FOR OP/ED): Mod: JW | Performed by: RADIOLOGY

## 2025-05-30 RX ORDER — MIDAZOLAM HYDROCHLORIDE 1 MG/ML
INJECTION, SOLUTION INTRAMUSCULAR; INTRAVENOUS AS NEEDED
Status: DISCONTINUED | OUTPATIENT
Start: 2025-05-30 | End: 2025-05-30 | Stop reason: HOSPADM

## 2025-05-30 RX ORDER — SODIUM CHLORIDE 9 MG/ML
INJECTION, SOLUTION INTRAVENOUS CONTINUOUS PRN
Status: COMPLETED | OUTPATIENT
Start: 2025-05-30 | End: 2025-05-30

## 2025-05-30 RX ORDER — FENTANYL CITRATE 50 UG/ML
INJECTION, SOLUTION INTRAMUSCULAR; INTRAVENOUS AS NEEDED
Status: DISCONTINUED | OUTPATIENT
Start: 2025-05-30 | End: 2025-05-30 | Stop reason: HOSPADM

## 2025-05-30 RX ORDER — CEFTRIAXONE 2 G/50ML
2 INJECTION, SOLUTION INTRAVENOUS ONCE
Status: COMPLETED | OUTPATIENT
Start: 2025-05-30 | End: 2025-05-30

## 2025-05-30 RX ORDER — LIDOCAINE HYDROCHLORIDE 10 MG/ML
INJECTION, SOLUTION EPIDURAL; INFILTRATION; INTRACAUDAL; PERINEURAL AS NEEDED
Status: DISCONTINUED | OUTPATIENT
Start: 2025-05-30 | End: 2025-05-30 | Stop reason: HOSPADM

## 2025-05-30 RX ORDER — CARVEDILOL 25 MG/1
12.5 TABLET ORAL
COMMUNITY
Start: 2025-05-30

## 2025-05-30 RX ADMIN — FENTANYL CITRATE 50 MCG: 50 INJECTION, SOLUTION INTRAMUSCULAR; INTRAVENOUS at 14:17

## 2025-05-30 RX ADMIN — Medication 2 L/MIN: at 13:58

## 2025-05-30 RX ADMIN — CEFTRIAXONE SODIUM 2 G: 2 INJECTION, SOLUTION INTRAVENOUS at 14:05

## 2025-05-30 RX ADMIN — MIDAZOLAM HYDROCHLORIDE 1 MG: 1 INJECTION, SOLUTION INTRAMUSCULAR; INTRAVENOUS at 14:18

## 2025-05-30 RX ADMIN — LIDOCAINE HYDROCHLORIDE 1 ML: 10 INJECTION, SOLUTION EPIDURAL; INFILTRATION; INTRACAUDAL; PERINEURAL at 14:21

## 2025-05-30 RX ADMIN — SODIUM CHLORIDE 100 ML/HR: 9 INJECTION, SOLUTION INTRAVENOUS at 13:59

## 2025-05-30 ASSESSMENT — PAIN - FUNCTIONAL ASSESSMENT
PAIN_FUNCTIONAL_ASSESSMENT: 0-10

## 2025-05-30 ASSESSMENT — ENCOUNTER SYMPTOMS
NEUROLOGICAL NEGATIVE: 1
ENDOCRINE NEGATIVE: 1
ALLERGIC/IMMUNOLOGIC NEGATIVE: 1
HEMATOLOGIC/LYMPHATIC NEGATIVE: 1
GASTROINTESTINAL NEGATIVE: 1
PSYCHIATRIC NEGATIVE: 1
MUSCULOSKELETAL NEGATIVE: 1
CONSTITUTIONAL NEGATIVE: 1
CARDIOVASCULAR NEGATIVE: 1
APNEA: 1
EYES NEGATIVE: 1

## 2025-05-30 ASSESSMENT — PAIN SCALES - GENERAL
PAINLEVEL_OUTOF10: 0 - NO PAIN

## 2025-05-30 NOTE — DISCHARGE INSTRUCTIONS
Suprapubic Tube- Patient and Family Education    Your suprapubic catheter is a soft tube placed by the Interventional Radiologist to help you  empty your bladder. This tube may be permanent, or stay in place until you are able to urinate  on your own. These instructions will provide a guideline to decrease the risk of complications  while caring for your catheter.    Sedation:  ? The sedation you receive for your procedure is still in your system.  ? Take it easy the rest of the day, do not drive.  ? Avoid drinking alcohol.  ? You will need an adult escort to drive you home.    Activity:  ? Resume your regular activity in 24 hours, unless you have been restricted for another  reason.    Care of the Suprapubic Tube:  ? You may take the dressing off in two days and take a shower. Keep the water from  directly pouring over the tube.  ? Wash your hands with antibacterial soap and water.  ? Take off the old bandage being careful not to pull on the tube.  ? Pull the bandage in a plastic bag and dispose of it in the wastebasket.  ? Wash around the site of the catheter daily gently with antibacterial soap and water.  ? Apply a clean bandage, careful not to touch the side of the bandage that will touch the  skin.  ? Tape the bandage to your skin.  ? The drainage bag can be worn on your leg or pinned to your clothing.  ? At night you can attach a bigger bag and attach to the side of your bed.  ? Empty the bag into the toilet every 2 to 3 hours.    Care after the procedure:  ? You may experience some discomfort after the procedure. This is normal.  ? You may take over-the-counter medicines such as Tylenol or Advil, unless you are  restricted from these medications.    Sedation:  If you received medication for sedation, it will be active in your body for approximately 24  hours. So you may feel a little sleepy. Therefore, for the next 24 hours:  ? DO NOT drive a car, operate machinery or power tools. It is recommended that a    responsible adult be with you for the first 24 hours.  ? DO NOT drink any alcoholic beverages or take any non-prescriptive medications that   contain alcohol.  ? DO NOT make any important decisions or sign any legal/important documents.    Call your Doctor if you have:  ? Fever or chills.  ? Urine stops draining into the bag.  ? Bleeding, redness, or swelling at the procedure site.    Call the Interventional Radiology Imaging & Intervention Department when:  ? If the tube falls out.  ? Your tube may need to be increased in size.     How to Reach your Doctor:    Call [phone] with problems or questions.     This info is a general resource. It is not meant to replace your health care provider’s advice.  Ask your doctor or health care team any questions. Always follow their instructions.

## 2025-05-30 NOTE — NURSING NOTE
Home going instructions specific to procedure given. Easily arousable; responding appropriately. Vs +/- 20% of pre procedure status. Significant complications are absent. Ambulates without dizziness/age appropriate activity, pulse ox above or equal to 92% on room air/ordered oxygen treatment. Care Plan Complete. Discharge to home accompanied by (family). Discharged via wheelchair.  PIV removed.

## 2025-05-30 NOTE — H&P
History Of Present Illness  Aly Mccarty is a 75 y.o. male presenting with neurogenic bladder, urinary retention with chronic fierro here for SPC placement with Dr. Lemons. PMHx significant for anemia, CKD IV, pancytopenia, chronic UTI, neurogenic bladder, chronic urinary rentention status fierro catheterization, aortic valve stenosis status post TAVR, hypertension, Afib on Xarelto,  non-obstructive coronary artery disease.     Past Medical History:  Medical History[1]     Past Surgical History:  Surgical History[2]       Social History:  Social History[3]    Family History:  Family History[4]     Allergies:  RX Allergies[5]     Home Medications:  Current Outpatient Medications   Medication Instructions    acetaminophen (Tylenol) 500 mg tablet Take 1-2 by mouth every 6 hours as needed for pain    carvedilol (COREG) 25 mg, oral, 2 times daily (morning and late afternoon), Take with food.    cetirizine (ZYRTEC) 10 mg, Every evening    febuxostat (ULORIC) 40 mg, oral, Daily    furosemide (LASIX) 40 mg, oral, Daily    hydrALAZINE (APRESOLINE) 50 mg, oral, 2 times daily    ketoconazole (NIZOral) 2 % cream APPLY TO AFFECTED AREA EVERY MORNING TILL CLEAR, REPEAT AS NEEDED.    levothyroxine (Synthroid, Levoxyl) 88 mcg tablet TAKE 1 TABLET BY MOUTH EVERY DAY ON EMPTY STOMACH    phenazopyridine (PYRIDIUM) 200 mg, 3 times daily (morning, midday, late afternoon)    predniSONE (Deltasone) 10 mg tablet if needed.    Ghazal-Chi Rx 1- mg-mg-mcg tablet 1 tablet, oral, Daily, Take with food.    sodium bicarbonate 650 mg, oral, 2 times daily    tamsulosin (FLOMAX) 0.4 mg, oral, Daily    Xarelto 20 mg, oral, Daily with evening meal       Inpatient Medications:  Scheduled Medications[6]  PRN Medications[7]  Continuous Medications[8]      Review of Systems   Constitutional: Negative.    HENT: Negative.     Eyes: Negative.    Respiratory:  Positive for apnea (CPAP home hs).    Cardiovascular: Negative.    Gastrointestinal:  Negative.    Endocrine: Negative.    Genitourinary: Negative.  Difficulty urinating: +fierro.        Hx urinary retention, +fierro   Musculoskeletal: Negative.    Skin: Negative.    Allergic/Immunologic: Negative.    Neurological: Negative.    Hematological: Negative.    Psychiatric/Behavioral: Negative.            Physical Exam  General:  Patient is awake, alert, and oriented.  Patient is in no acute distress.  HEENT:  Pupils equal and reactive.  Normocephalic.  Moist mucosa.    Neck:  Difficulty assessing JVD 2/2 body habitus  Cardiovascular:  Regular rate and rhythm.  Normal S1 and S2. No murmurs/rubs/gallops. Radial pulses 2+.   Pulmonary:  Clear to auscultation bilaterally.  Abdomen:  Soft. Non-tender.  Obese.  Non-distended.  Positive bowel sounds.  : Fierro with clear yellow urine.   Lower Extremities:  Pedal pulses 2+ No LE edema.  Neurologic:  Cranial nerves II-XII grossly intact.   No focal deficit.   Skin: Skin warm and dry, no lesions. Normal skin turgor.   Psychiatric: Normal affect.     Sedation Plan    ASA 3     Mallampati class: III.    Risks, benefits, and alternatives discussed with patient.         NPO since 0000    Last Recorded Vitals  Blood pressure 146/77, pulse 52, temperature 36.2 °C (97.2 °F), temperature source Temporal, resp. rate 12, SpO2 96%.         Vitals from the Past 24 Hours  Heart Rate:  [52]   Temp:  [36.2 °C (97.2 °F)]   Resp:  [12]   BP: (146)/(77)   SpO2:  [96 %]          Relevant Results    Labs    POCT Glucose:      POCT Urine Pregnancy:       CBC:   Recent Labs     05/19/25  1152 04/21/25  1106 04/10/25  1145 03/19/25  1333 02/26/25  1343 02/05/25  1317   WBC 4.4 4.4 5.2 4.8 4.2* 4.3*   HGB 13.6 13.4* 13.1* 12.6* 12.5* 11.8*   HCT 42.3 40.5* 40.4* 40.0* 39.4* 38.3*   * 101* 94* 113* 89* 103*   * 102* 104* 106* 104* 105*     BMP/CMP:   Recent Labs     04/10/25  1145 03/19/25  1333 11/20/24  1159 10/23/24  1131 08/14/24  1026 07/17/24  1112 07/03/24  0642  "07/02/24  0653   * 137 134* 135* 131* 138 145 144   K 5.3 3.9 4.0 4.0 4.6 3.6 3.8 3.6    103 101 103 104 103 106 107   BUN 19 25* 29* 32* 49* 18 17 18   CREATININE 1.80* 2.02* 1.87* 1.78* 2.34* 1.50* 1.25 1.25   CO2 25 17* 23 22 20* 26 33* 31   CALCIUM 8.5* 8.8 9.1 9.0 8.6 9.0 8.5* 8.5*   PROT 7.2 7.0 7.1  --  6.5  --  6.2* 6.3*   BILITOT 0.7 0.6 0.5  --  0.6  --  0.8 0.9   ALKPHOS 45 54 67  --  94  --  101 97   ALT 13 12 8*  --  9*  --  18 20   AST 42* 17 16  --  14  --  16 13   GLUCOSE 106* 90 94 95 104* 103* 95 89      Magnesium:   Recent Labs     08/14/24  1026 07/03/24  0642 07/02/24  0653 11/23/22  0519 11/22/22  1650   MG 2.32 1.88 1.93 1.83 1.92     Lipid Panel:   Recent Labs     07/12/23  0936 06/13/22  1300 04/27/18  0856   CHOL 181 152 180   HDL 47.4 58.8 38.4*   CHHDL 3.8 2.6 4.7   VLDL 24 13 26   TRIG 119 63 128     Cardiac       No lab exists for component: \"CK\", \"CKMBP\"   Hemoglobin A1C:   Recent Labs     07/12/23  0936 04/27/18  0856   HGBA1C 4.8 5.7     TSH/ Free T4:   Recent Labs     07/17/24  1112 08/24/23  1328 08/24/23  1315 07/12/23  0936 06/13/22  1300 06/03/21  1018 10/01/18  0918 04/27/18  0856   TSH 2.56 CANCELED 1.18 1.01 2.78 0.39* 3.58 2.03     Iron:   Recent Labs     03/19/25  1333 02/05/25  1317 07/01/24  0659 06/19/24  1156 05/02/24  1037 02/29/24  1014 11/30/23  1017 08/24/23  1328 08/24/23  1315 05/04/20  0919   FERRITIN 54 61  --  117 248 185 104 CANCELED 137  --    TIBC 332 348  --  338 288 332 347 CANCELED 300  --    IRONSAT 29 28  --  21* 31 43 39 CANCELED 40  --    BNP  --   --  594*  --   --   --   --   --   --  249*     Coag:     ABO: No results found for: \"ABO\"    Past Cardiology Tests (Last 3 Years):    EKG:  Recent Labs     07/01/24  0725 11/23/22  0535 11/22/22  1654 11/22/22  1203 07/11/18  1011 05/28/17  1951/17  0803 05/15/17  1123 01/18/17  1448   ATRRATE  --  68 64 64   < > 68 73   < > 57   VENTRATE 70 64 63 53   < > 68 73   < > 56   PRINT  --   --  "  --   --   --  192 184  --  192   QRSDUR 118 110 114 110   < > 90 92   < > 104   QTCFRED 457 449 451 426   < > 459 457   < > 475   QTCCALCB 468 453 454 416   < > 467 471   < > 468    < > = values in this interval not displayed.   No results found. However, due to the size of the patient record, not all encounters were searched. Please check Results Review for a complete set of results.  Echo:  Echocardiogram:   Transthoracic Echo (TTE) Complete 08/21/2024    [facility], [address] [place] [address]  Tel [phone] and Fax [phone]    TRANSTHORACIC ECHOCARDIOGRAM REPORT      Patient Name:      PAMELA CANCINO    Reading Physician:    [id number] Deejay Hensley MD  Study Date:        8/21/2024            Ordering Provider:    [id number] SHIMA KEVIN  MRN/PID:           [id number]             Fellow:  Accession#:        [id number]         Nurse:  Date of Birth/Age: 1950 / 74 years Sonographer:          Lisa Stevens  [facility]  Gender:            M                    Additional Staff:  Height:            187.96 cm            Admit Date:  Weight:            149.69 kg            Admission Status:     Outpatient  BSA / BMI:         2.69 m2 / 42.37      Encounter#:           [id number]  kg/m2  Blood Pressure:    111/54 mmHg          Department Location:  [facility]    Study Type:    TRANSTHORACIC ECHO (TTE) COMPLETE  Diagnosis/ICD: Chronic diastolic (congestive) heart failure (CHF)-I50.32  Indication:    Chronic diastolic CHF  CPT Code:      Echo Complete w Full Doppler-[id number]    Patient History:  Valve Disorders:   Aortic Valve Replacement.  Diabetes:          Yes  Pertinent History: CHF, HTN, A-Fib, Hyperlipidemia, COPD and CKD. TAVR 2020 #29  Johan; AMANDA.    Study Detail: The following Echo studies were performed: 2D, M-Mode, Doppler and  color flow. Technically challenging study due to body habitus.      PHYSICIAN INTERPRETATION:  Left Ventricle: The left ventricular systolic  function is normal, with a visually estimated ejection fraction of 55-60%. There are no regional left ventricular wall motion abnormalities. The left ventricular cavity size is normal. There is concentric left ventricular hypertrophy. Spectral Doppler shows an impaired relaxation pattern of left ventricular diastolic filling.  Left Atrium: The left atrium is severely dilated.  Right Ventricle: The right ventricle is normal in size. There is normal right ventricular global systolic function.  Right Atrium: The right atrium is severely dilated.  Aortic Valve: There is a prosthetic aortic valve present. The aortic valve dimensionless index is 0.54. There is transcatheter aortic valve replacement. There is no evidence of aortic valve regurgitation. The peak instantaneous gradient of the aortic valve is 28.7 mmHg. The mean gradient of the aortic valve is 17.0 mmHg.  Mitral Valve: The mitral valve is normal in structure. There is evidence of mild mitral valve stenosis. The doppler estimated mean and peak diastolic pressure gradients are 3.0 mmHg and 10.5 mmHg respectively. There is mild to moderate mitral annular calcification. There is no evidence of mitral valve regurgitation.  Tricuspid Valve: The tricuspid valve is structurally normal. There is mild tricuspid regurgitation.  Pulmonic Valve: The pulmonic valve is not well visualized. The pulmonic valve regurgitation was not well visualized.  Pericardium: There is no pericardial effusion noted.  Aorta: The aortic root is normal.  Pulmonary Artery: The tricuspid regurgitant velocity is 3.05 m/s, and with an estimated right atrial pressure of 10 mmHg, the estimated pulmonary artery pressure is mildly elevated with the RVSP at 47.2 mmHg.  Systemic Veins: The inferior vena cava was not well visualized.      CONCLUSIONS:  1. The left ventricular systolic function is normal, with a visually estimated ejection fraction of 55-60%.  2. Spectral Doppler shows an impaired  relaxation pattern of left ventricular diastolic filling.  3. There is normal right ventricular global systolic function.  4. The left atrium is severely dilated.  5. The right atrium is severely dilated.  6. There is mild mitral valve stenosis.  7. There is a transcatheter aortic valve replacement. The peak instantaneous gradient of the aortic valve is 28.7 mmHg. The mean gradient of the aortic valve is 17.0 mmHg.  8. There is mild tricuspid regurgitation.  9. The estimated pulmonary artery pressure is mildly elevated with the RVSP at 47.2 mmHg.    QUANTITATIVE DATA SUMMARY:  2D MEASUREMENTS:  Normal Ranges:  LAs:           5.40 cm  (2.7-4.0cm)  IVSd:          1.60 cm  (0.6-1.1cm)  LVPWd:         1.40 cm  (0.6-1.1cm)  LVIDd:         4.90 cm  (3.9-5.9cm)  LVIDs:         3.60 cm  LV Mass Index: 116 g/m2  LVEDV Index:   37 ml/m2  LV % FS        26.5 %    LA VOLUME:  Normal Ranges:  LA Vol A4C:        147.9 ml   (22+/-6mL/m2)  LA Vol A2C:        159.0 ml  LA Vol BP:         154.8 ml  LA Vol Index A4C:  55.0ml/m2  LA Vol Index A2C:  59.1 ml/m2  LA Vol Index BP:   57.6 ml/m2  LA Area A4C:       37.0 cm2  LA Area A2C:       38.0 cm2  LA Major Axis A4C: 7.9 cm  LA Major Axis A2C: 7.7 cm  LA Volume Index:   53.2 ml/m2  LA Vol A4C:        137.0 ml  LA Vol A2C:        147.0 ml  LA Vol Index BSA:  52.8 ml/m2    M-MODE MEASUREMENTS:  Normal Ranges:  Ao Root: 4.10 cm (2.0-3.7cm)    AORTA MEASUREMENTS:  Normal Ranges:  Asc Ao, d: 3.60 cm (2.1-3.4cm)    LV SYSTOLIC FUNCTION BY 2D PLANIMETRY (MOD):  Normal Ranges:  EF-A4C View:    52 % (>=55%)  EF-A2C View:    55 %  EF-Biplane:     53 %  EF-Visual:      58 %  LV EF Reported: 58 %    LV DIASTOLIC FUNCTION:  Normal Ranges:  MV e'           0.097 m/s  (>8.0)  MV lateral e'   0.10 m/s  MV medial e'    0.09 m/s  PulmV Sys Bobby:  32.80 cm/s  PulmV Enriquez Bobby: 58.60 cm/s  PulmV S/D Bobby:  0.60    MITRAL VALVE:  Normal Ranges:  MV Vmax:    1.62 m/s  (<=1.3m/s)  MV peak PG: 10.5 mmHg  (<5mmHg)  MV mean PG: 3.0 mmHg  (<2mmHg)    AORTIC VALVE:  Normal Ranges:  AoV Vmax:                2.68 m/s  (<=1.7m/s)  AoV Peak P.7 mmHg (<20mmHg)  AoV Mean P.0 mmHg (1.7-11.5mmHg)  LVOT Max Bobby:            1.42 m/s  (<=1.1m/s)  AoV VTI:                 61.60 cm  (18-25cm)  LVOT VTI:                33.50 cm  LVOT Diameter:           2.40 cm   (1.8-2.4cm)  AoV Area, VTI:           2.46 cm2  (2.5-5.5cm2)  AoV Area,Vmax:           2.40 cm2  (2.5-4.5cm2)  AoV Dimensionless Index: 0.54      RIGHT VENTRICLE:  RV Basal 4.78 cm  RV Mid   3.68 cm  RV Major 7.9 cm  TAPSE:   19.6 mm  RV s'    0.12 m/s    TRICUSPID VALVE/RVSP:  Normal Ranges:  Peak TR Velocity: 3.05 m/s  Est. RA Pressure: 10 mmHg  RV Syst Pressure: 47.2 mmHg (< 30mmHg)    PULMONIC VALVE:  Normal Ranges:  PV Max Bobby: 1.3 m/s  (0.6-0.9m/s)  PV Max P.4 mmHg    Pulmonary Veins:  PulmV Enriquez Bobby: 58.60 cm/s  PulmV S/D Bobby:  0.60  PulmV Sys Bobby:  32.80 cm/s      [id number] Deejay Hensley MD  Electronically signed on 2024 at 12:38:57 AM        ** Final **    Ejection Fractions:  LV EF   Date/Time Value Ref Range Status   2024 03:12 PM 58 %      Cath:  Coronary Angiography:   Adult Cath     Narrative  [facility], Cath Lab, [address] [place] [address]    Cardiovascular Catheterization Report    Patient Name:     PAMELA CANCINO Performing Physician:  [id number] Menendez MD  Study Date:       10/24/2022        Verifying Physician:   Melvin Menendez MD  MRN/PID:          [id number]          Cardiologist/Co-scrub:  Accession/Order#: [id number]         Fellow:                [id number] Tee Ngo MD  YOB: 1950         Fellow:  Gender:           M                 Referring Physician:   MERLIN MENENDEZ  Admit Date:                         Referring Physician:   rogerio  Surgeon:                            Referring Physician:   rogerio      Study: Left Heart  Catheterization      Indications:  PAMELA CANCINO is a 73 year old male who presents with atrial fibrillation and hypertension. Valvular disease, with an asymptomatic chest pain assessment. Study performed as an elective cath procedure.    Medical History:  Stress test performed: No. CTA performed: No. Amelie accessed: No. LVEF Assessed: No.    Procedure Description:  After infiltration with 1% Lidocaine, the right radial artery was cannulated with a modified Seldinger technique. Selective coronary catheterization was performed using a 6 Fr catheter(s) exchanged over a guide wire to cannulate the coronary arteries.  After completion of the procedure, the arterial sheath was pulled and a TR Band Radial Compression Device was utilized to obtain patent hemostasis.    Coronary Angiography:  The coronary circulation is right dominant.    Left Main Coronary Artery:  The left main coronary artery is a normal caliber vessel. The left main arises normally from the left coronary sinus of Valsalva and bifurcates into the LAD and circumflex coronary arteries. The left main coronary artery showed no significant disease or stenosis greater than 30%.    Left Anterior Descending Coronary Artery Distribution:  The left anterior descending coronary artery is a normal caliber vessel. The LAD arises normally from the left main coronary artery. The LAD demonstrated no significant disease or stenosis greater than 30%.    Circumflex Coronary Artery Distribution:  The circumflex coronary artery is a normal caliber vessel. The circumflex arises normally from the left main coronary artery and terminates in the AV groove. The circumflex revealed no significant disease or stenosis greater than 30%.    Right Coronary Artery Distribution:    The right coronary artery is a normal caliber vessel. The RCA arises normally from the right sinus of Valsalva. The RCA showed no significant disease or stenosis greater than 30%.    Coronary  Interventions:    Hemo Personnel:  +-----------------------+-------------+  Name                   Duty           +-----------------------+-------------+  Myles Menendez MD, MD 1  +-----------------------+-------------+  Bran Lucas RN    PROC CIRC 1  +-----------------------+-------------+  Won Maojr RN    PROC RECORD 1  +-----------------------+-------------+  Daphney Figueroa RN PROC NURSE 1  +-----------------------+-------------+  Tee Ngo MD       FELLOW PHYS 1  +-----------------------+-------------+      Sedation Time:  +------------------------+----------------------------------------+  Sedation Start/End TimesTime                                      +------------------------+----------------------------------------+  Start                   10/24/2022 16:07:06                       +------------------------+----------------------------------------+  Drugs                   Versed 1 mg IV per physician for sedatio  +------------------------+----------------------------------------+  End                     10/24/2022 16:35:06                       +------------------------+----------------------------------------+          Fluoroscopy Time:  +--------------------------+--------+  X-Ray Summary Fluoro Time:5.80 min  +--------------------------+--------+      +----------+--------+  Contrast: Dose:     +----------+--------+  Omnipaque:25.00 ml  +----------+--------+      Hemodynamic Pressures:    +----+---------------------+----------+-------------+--------------+---------+  Site      Date Time      Phase NameSystolic mmHgDiastolic mmHgMean mmHg  +----+---------------------+----------+-------------+--------------+---------+    AO10/24/2022 4:23:32 PM      Rest          192            97      135  +----+---------------------+----------+-------------+--------------+---------+    AO10/24/2022 4:27:50 PM      Rest           209            96      136  +----+---------------------+----------+-------------+--------------+---------+        Oxygen Saturation %:  +-----------+------------+  Sample SiteHB (g/100ml)  +-----------+------------+      SYS ART        13.7  +-----------+------------+      SYS ALEXYS        13.7  +-----------+------------+      PUL ART        13.7  +-----------+------------+      PUL ALEXYS        13.7  +-----------+------------+      Cardiac Cath Transition of Care Summary:  Post Procedure Diagnosis: Aortic Stenosis.  Blood Loss:               Estimated blood loss during the procedure was 25 mls.  Specimens Removed:        Number of specimen(s) removed: none.      Recommendations:  Continue with ANGELINA work up.    ____________________________________________________________________________________  CONCLUSIONS:  1. Non obstructive CAD.    ____________________________________________________________________________________  CPT Codes:  Left Heart Cath (visualization of coronaries) and LV-94792    ICD 10 Codes:  I35.0-Nonrheumatic aortic (valve) stenosis  Exertional dyspnea (anginal equivalent)    [id number] Merlin Menendez MD  Performing Physician  Electronically signed by [id number] Merlin Menendez MD on 10/28/2022 at 4:08:20  PM      cc Report to: MERLIN MENENDEZ    cc Report to: x    cc Report to: x              Right Heart Cath: No results found for this or any previous visit from the past 1800 days.    Stress Test:  Nuclear:No results found for this or any previous visit from the past 1800 days.    Metabolic Stress: No results found for this or any previous visit from the past 1800 days.    Cardiac Imaging:  Cardiac Scoring: No results found for this or any previous visit from the past 1800 days.    Cardiac MRI: No results found for this or any previous visit from the past 1800 days.         Assessment/Plan  Assessment/Plan   Assessment & Plan  Neurogenic bladder        -SPC placement with   Cristo today 5/30/25  -2 gm Ceftriaxone IV pre-procedure       NP discussed with Dr. Lemons regarding plan of care/ discharge plan      I spent 30 minutes in the professional and overall care of this patient.      Kerry Almazan, APRN-CNP         [1]   Past Medical History:  Diagnosis Date    (HFpEF) heart failure with preserved ejection fraction     Anemia     Aortic valve stenosis     S/P TAVR    Arthritis     Atrial fibrillation 10/10/2021    Benign prostatic hyperplasia with urinary retention     self caths 3-4 times daily    calcified aortic valve     Chronic UTI     CKD (chronic kidney disease)     Class 3 severe obesity with body mass index (BMI) of 40.0 to 44.9 in adult 08/19/2024    42.46 kg/m²    Coronary artery disease     ETOH abuse     Fatty liver     GERD (gastroesophageal reflux disease)     GI (gastrointestinal bleed)     History of hemorrhagic diathesis     History of spinal stenosis     History of transfusion 2017    GI bleed   no reaction    HL (hearing loss)     Hyperlipidemia     Hypertension     hypothyroidism     Idiopathic neuropathy     Osteomyelitis     Pancytopenia     Pulmonary HTN (Multi)     SBO (small bowel obstruction) (Multi)     Septicemia (Multi)     Sleep apnea     Sleep apnea with use of continuous positive airway pressure (CPAP)     Thrombocytopenia concurrent with and due to alcoholism (Multi)     Umbilical hernia     Urinary tract infection 2018    Uses wheelchair     Vitamin B12 deficiency     Vitamin D deficiency     Wears hearing aid in both ears    [2]   Past Surgical History:  Procedure Laterality Date    ABDOMINAL SURGERY      AORTIC VALVE REPLACEMENT      TAVR    CARDIAC SURGERY      COLONOSCOPY      EYE SURGERY      FRACTURE SURGERY      LEG SURGERY  08/08/2019    OTHER SURGICAL HISTORY  07/07/2021    Tonsillectomy with adenoidectomy    PICC LINE INSERTION WO IMAGING  08/08/2019    TONSILLECTOMY      UPPER GASTROINTESTINAL ENDOSCOPY      VASCULAR SURGERY       VASECTOMY  1984   [3]   Social History  Tobacco Use    Smoking status: Never    Smokeless tobacco: Never   Vaping Use    Vaping status: Never Used   Substance Use Topics    Alcohol use: Not Currently     Alcohol/week: 21.0 standard drinks of alcohol     Types: 21 Standard drinks or equivalent per week     Comment: hx ETOH abuse    Drug use: Yes     Types: Marijuana     Comment: Edibles   [4]   Family History  Problem Relation Name Age of Onset    Hypertension Mother Yanira     Breast cancer Mother Yanira     Kidney disease Father Aly CARRILLO         CKD    Peripheral vascular disease Father Aly CARRILLO     Cancer Sibling     [5]   Allergies  Allergen Reactions    Levofloxacin Angioedema and Hives   [6]   Scheduled medications   Medication Dose Route Frequency    cefTRIAXone  2 g intravenous Once   [7]   PRN medications   Medication   [8]   Continuous Medications   Medication Dose Last Rate

## 2025-05-30 NOTE — POST-PROCEDURE NOTE
Interventional Radiology Brief Postprocedure Note    Attending: Cristo    Assistant: None    Diagnosis: urinary retention    Description of procedure: Percutaneous 18F pigtail suprapubic tube, to gravity.     Anesthesia:  Local  1 mg Versed, 50 mcg Fentanyl    Complications: None    Estimated Blood Loss: minimal        See detailed result report with images in PACS.    The patient tolerated the procedure well without incident or complication and is in stable condition.

## 2025-05-31 ENCOUNTER — HOSPITAL ENCOUNTER (EMERGENCY)
Facility: HOSPITAL | Age: 75
Discharge: HOME | End: 2025-05-31
Payer: MEDICARE

## 2025-05-31 VITALS
RESPIRATION RATE: 17 BRPM | OXYGEN SATURATION: 96 % | HEART RATE: 71 BPM | TEMPERATURE: 97.2 F | HEIGHT: 74 IN | WEIGHT: 315 LBS | DIASTOLIC BLOOD PRESSURE: 88 MMHG | BODY MASS INDEX: 40.43 KG/M2 | SYSTOLIC BLOOD PRESSURE: 197 MMHG

## 2025-05-31 DIAGNOSIS — G89.18 POST-OPERATIVE PAIN: ICD-10-CM

## 2025-05-31 DIAGNOSIS — R30.0 DYSURIA: Primary | ICD-10-CM

## 2025-05-31 DIAGNOSIS — N30.01 ACUTE CYSTITIS WITH HEMATURIA: ICD-10-CM

## 2025-05-31 LAB
APPEARANCE UR: ABNORMAL
BILIRUB UR STRIP.AUTO-MCNC: NEGATIVE MG/DL
COLOR UR: ABNORMAL
GLUCOSE UR STRIP.AUTO-MCNC: NORMAL MG/DL
HOLD SPECIMEN: NORMAL
KETONES UR STRIP.AUTO-MCNC: NEGATIVE MG/DL
LEUKOCYTE ESTERASE UR QL STRIP.AUTO: ABNORMAL
MUCOUS THREADS #/AREA URNS AUTO: ABNORMAL /LPF
NITRITE UR QL STRIP.AUTO: NEGATIVE
PH UR STRIP.AUTO: 7 [PH]
PROT UR STRIP.AUTO-MCNC: ABNORMAL MG/DL
RBC # UR STRIP.AUTO: ABNORMAL MG/DL
RBC #/AREA URNS AUTO: >20 /HPF
SP GR UR STRIP.AUTO: 1.02
UROBILINOGEN UR STRIP.AUTO-MCNC: NORMAL MG/DL
WBC #/AREA URNS AUTO: >50 /HPF
WBC CLUMPS #/AREA URNS AUTO: ABNORMAL /HPF

## 2025-05-31 PROCEDURE — 81003 URINALYSIS AUTO W/O SCOPE: CPT | Performed by: NURSE PRACTITIONER

## 2025-05-31 PROCEDURE — 87086 URINE CULTURE/COLONY COUNT: CPT | Mod: GEALAB | Performed by: NURSE PRACTITIONER

## 2025-05-31 PROCEDURE — 2500000001 HC RX 250 WO HCPCS SELF ADMINISTERED DRUGS (ALT 637 FOR MEDICARE OP): Performed by: NURSE PRACTITIONER

## 2025-05-31 PROCEDURE — 99284 EMERGENCY DEPT VISIT MOD MDM: CPT

## 2025-05-31 PROCEDURE — 2500000002 HC RX 250 W HCPCS SELF ADMINISTERED DRUGS (ALT 637 FOR MEDICARE OP, ALT 636 FOR OP/ED): Performed by: NURSE PRACTITIONER

## 2025-05-31 PROCEDURE — 99283 EMERGENCY DEPT VISIT LOW MDM: CPT

## 2025-05-31 RX ORDER — SULFAMETHOXAZOLE AND TRIMETHOPRIM 800; 160 MG/1; MG/1
1 TABLET ORAL 2 TIMES DAILY
Qty: 14 TABLET | Refills: 0 | Status: SHIPPED | OUTPATIENT
Start: 2025-05-31 | End: 2025-06-07

## 2025-05-31 RX ORDER — OXYCODONE AND ACETAMINOPHEN 5; 325 MG/1; MG/1
1 TABLET ORAL EVERY 6 HOURS PRN
Qty: 5 TABLET | Refills: 0 | Status: SHIPPED | OUTPATIENT
Start: 2025-05-31 | End: 2025-06-03

## 2025-05-31 RX ORDER — LANOLIN ALCOHOL/MO/W.PET/CERES
1000 CREAM (GRAM) TOPICAL DAILY
COMMUNITY

## 2025-05-31 RX ORDER — PHENAZOPYRIDINE HYDROCHLORIDE 200 MG/1
200 TABLET, FILM COATED ORAL 3 TIMES DAILY PRN
Qty: 6 TABLET | Refills: 0 | Status: SHIPPED | OUTPATIENT
Start: 2025-05-31 | End: 2025-06-02 | Stop reason: SDUPTHER

## 2025-05-31 RX ORDER — SULFAMETHOXAZOLE AND TRIMETHOPRIM 800; 160 MG/1; MG/1
1 TABLET ORAL ONCE
Status: COMPLETED | OUTPATIENT
Start: 2025-05-31 | End: 2025-05-31

## 2025-05-31 RX ORDER — OXYCODONE AND ACETAMINOPHEN 5; 325 MG/1; MG/1
1 TABLET ORAL ONCE
Refills: 0 | Status: COMPLETED | OUTPATIENT
Start: 2025-05-31 | End: 2025-05-31

## 2025-05-31 RX ORDER — PHENAZOPYRIDINE HYDROCHLORIDE 100 MG/1
95 TABLET, FILM COATED ORAL ONCE
Status: COMPLETED | OUTPATIENT
Start: 2025-05-31 | End: 2025-05-31

## 2025-05-31 RX ADMIN — SULFAMETHOXAZOLE AND TRIMETHOPRIM 1 TABLET: 800; 160 TABLET ORAL at 10:41

## 2025-05-31 RX ADMIN — PHENAZOPYRIDINE 100 MG: 100 TABLET ORAL at 09:43

## 2025-05-31 RX ADMIN — OXYCODONE HYDROCHLORIDE AND ACETAMINOPHEN 1 TABLET: 5; 325 TABLET ORAL at 10:41

## 2025-05-31 ASSESSMENT — PAIN DESCRIPTION - DIRECTION: RADIATING_TOWARDS: PENIS

## 2025-05-31 ASSESSMENT — PAIN DESCRIPTION - LOCATION
LOCATION: ABDOMEN
LOCATION: PENIS

## 2025-05-31 ASSESSMENT — LIFESTYLE VARIABLES
HAVE YOU EVER FELT YOU SHOULD CUT DOWN ON YOUR DRINKING: NO
TOTAL SCORE: 0
EVER FELT BAD OR GUILTY ABOUT YOUR DRINKING: NO
EVER HAD A DRINK FIRST THING IN THE MORNING TO STEADY YOUR NERVES TO GET RID OF A HANGOVER: NO
HAVE PEOPLE ANNOYED YOU BY CRITICIZING YOUR DRINKING: NO

## 2025-05-31 ASSESSMENT — PAIN DESCRIPTION - DESCRIPTORS: DESCRIPTORS: BURNING

## 2025-05-31 ASSESSMENT — PAIN DESCRIPTION - PAIN TYPE
TYPE: ACUTE PAIN
TYPE: ACUTE PAIN

## 2025-05-31 ASSESSMENT — PAIN - FUNCTIONAL ASSESSMENT
PAIN_FUNCTIONAL_ASSESSMENT: 0-10
PAIN_FUNCTIONAL_ASSESSMENT: 0-10

## 2025-05-31 ASSESSMENT — PAIN SCALES - GENERAL
PAINLEVEL_OUTOF10: 5 - MODERATE PAIN
PAINLEVEL_OUTOF10: 7
PAINLEVEL_OUTOF10: 0 - NO PAIN

## 2025-05-31 NOTE — ED PROVIDER NOTES
Emergency Department Encounter  [facility] EMERGENCY MEDICINE    Patient: Aly Mccarty  MRN: [id number]  : 1950  Date of Evaluation: 2025  ED Provider: DINORA Nazario      Chief Complaint       Chief Complaint   Patient presents with    Abdominal Pain     Pt c/o lower abd pain and burning at his penis. Pt had a fierro removed and had a suprapubic cath placed yesterday.     [place]    (Location/Symptom, Timing/Onset, Context/Setting, Quality, Duration, Modifying Factors, Severity) Note limiting factors.   Limitations to History: none   Historian: self, wife  Records reviewed: EMR inpatient and outpatient notes, Care Everywhere      Aly Mccarty is a 75 y.o. male who presents to the emergency department complaining of penile burning, urgency since yesterday.  States that this has been a chronic problem which is why he has had to have chronic Fierro catheters.  Wife states that this is how he feels when he has cystitis or when his bladder feels full.  Yesterday patient had a suprapubic catheter placed to replace having a chronic indwelling Fierro catheter.  Patient started having discomfort at the surgical site and burning sensation in the penis.  Attempted to straight cath at home with no relief of symptoms, has been on Pyridium in the past but has not taken any medications for discomfort currently.  Not currently on antibiotics.  Has urine in the bag.  Denies any fevers, chills, flank pain, chest pain, shortness of breath.    ROS:     Review of Systems  14 systems reviewed and otherwise acutely negative except as in the [place].      Past History   Medical History[1]  Surgical History[2]  Social History[3]    Medications/Allergies     Previous Medications    ACETAMINOPHEN (TYLENOL) 500 MG TABLET    Take 1-2 by mouth every 6 hours as needed for pain    CARVEDILOL (COREG) 25 MG TABLET    Take 0.5 tablets (12.5 mg) by mouth 2 times daily (morning and late afternoon). Take with food.     CETIRIZINE (ZYRTEC) 10 MG TABLET    Take 1 tablet (10 mg) by mouth once daily in the evening.    CYANOCOBALAMIN (VITAMIN B-12) 1,000 MCG TABLET    Take 1 tablet (1,000 mcg) by mouth once daily.    FEBUXOSTAT (ULORIC) 40 MG TABLET    Take 1 tablet (40 mg) by mouth once daily.    FUROSEMIDE (LASIX) 40 MG TABLET    Take 1 tablet (40 mg) by mouth once daily.    HYDRALAZINE (APRESOLINE) 50 MG TABLET    Take 1 tablet (50 mg) by mouth 2 times a day.    KETOCONAZOLE (NIZORAL) 2 % CREAM    APPLY TO AFFECTED AREA EVERY MORNING TILL CLEAR, REPEAT AS NEEDED.    LEVOTHYROXINE (SYNTHROID, LEVOXYL) 88 MCG TABLET    TAKE 1 TABLET BY MOUTH EVERY DAY ON EMPTY STOMACH    YUE-MAREK RX 1- MG-MG-MCG TABLET    TAKE 1 TABLET BY MOUTH EVERY DAY WITH FOOD    SODIUM BICARBONATE 650 MG TABLET    Take 1 tablet (650 mg) by mouth 2 times a day.    XARELTO 20 MG TABLET    Take 1 tablet (20 mg) by mouth once daily in the evening. Take with meals.     Allergies[4]     Physical Exam       ED Triage Vitals [05/31/25 0902]   Temperature Heart Rate Respirations BP   36.2 °C (97.2 °F) 70 18 (!) 203/91      Pulse Ox Temp Source Heart Rate Source Patient Position   96 % Temporal -- --      BP Location FiO2 (%)     -- --         Physical Exam    GENERAL:  The patient appears nourished and normally developed. Vital signs as documented.     HEENT:  Head normocephalic, atraumatic, EOMs intact, PERRLA, Mucous membranes moist. Nares patent without copious rhinorrhea.  No lymphadenopathy.    PULMONARY:  Lungs are clear to auscultation, without any respiratory distress. Able to speak full sentences, no accessory muscle use    CARDIAC:   Normal rate. No murmurs, rubs or gallops    ABDOMEN:  Soft, non distended, non tender, BS positive x 4 quadrants, No rebound or guarding, no peritoneal signs, no CVA tenderness, no masses or organomegaly    MUSCULOSKELETAL:   Able to ambulate, Non edematous, with no obvious deformities. Pulses intact distal    : No  testicular pain, masses, no rashes or lesions, suprapubic catheter in place with bandage over the surgical site right pelvis, darker clear urine noted in bag with some sediment    SKIN:   Good color, with no significant rashes.  No pallor.    NEURO:  No obvious neurological deficits, normal sensation and strength bilaterally.  Able to follow commands, NIH 0, CN 2-12 intact.        Diagnostics   Labs:  Labs Reviewed   URINALYSIS WITH REFLEX CULTURE AND MICROSCOPIC - Abnormal       Result Value    Color, Urine Light-Orange (*)     Appearance, Urine Turbid (*)     Specific Gravity, Urine 1.017      pH, Urine 7.0      Protein, Urine 200 (2+) (*)     Glucose, Urine Normal      Blood, Urine OVER (3+) (*)     Ketones, Urine NEGATIVE      Bilirubin, Urine NEGATIVE      Urobilinogen, Urine Normal      Nitrite, Urine NEGATIVE      Leukocyte Esterase, Urine 500 [place]/uL (*)     Narrative:     OVER is reported when the result is greater than the clinically reportable range.   URINE CULTURE   URINALYSIS WITH REFLEX CULTURE AND MICROSCOPIC    Narrative:     The following orders were created for panel order Urinalysis with Reflex Culture and Microscopic.  Procedure                               Abnormality         Status                     ---------                               -----------         ------                     Urinalysis with Reflex C...[[id number]]  Abnormal            Final result               Extra Urine Gray Tube[[id number]]                            In process                   Please view results for these tests on the individual orders.   EXTRA URINE GRAY TUBE   MICROSCOPIC ONLY, URINE     Radiographs:  No orders to display             Assessment   In brief, Aly Mccarty is a 75 y.o. male who presented to the emergency department with penile burning and discomfort status post suprapubic catheter placement    Plan   Bladder scan, Pyridium, urinalysis    Differentials   Cystitis  Postoperative  "discomfort  Penile discomfort due to straight catheterization    ED Course     Diagnoses as of 05/31/25 1027   Dysuria   Acute cystitis with hematuria   Post-operative pain       Visit Vitals  BP (!) 203/91   Pulse 70   Temp 36.2 °C (97.2 °F) (Temporal)   Resp 18   Ht 1.88 m (6' 2\")   Wt 150 kg (330 lb 11 oz)   SpO2 96%   BMI 42.46 kg/m²   Smoking Status Never   BSA 2.8 m²       Medications   sulfamethoxazole-trimethoprim (Bactrim DS) 800-160 mg per tablet 1 tablet (has no administration in time range)   oxyCODONE-acetaminophen (Percocet) 5-325 mg per tablet 1 tablet (has no administration in time range)   phenazopyridine (Pyridium) tablet 100 mg (100 mg oral Given 5/31/25 0943)       Plan of care discussed, patient is stable appearing, bladder scan performed with 0 cc of urine in bladder, suprapubic catheter does not appear to be obstructive, there is sediment in the bag.  After discussion with patient and family, appears patient has chronically had a burning sensation which is why he ended up with a [place] catheter to begin with.  Ordered Pyridium, urinalysis obtained reviewed showing 500 leukocytes, previous cultures were reviewed and urine had been susceptible to Bactrim.  This was ordered and patient was also given a dose of Percocet for pain at the request of patient and family.  Patient will be discharged in stable condition, advised to follow-up with his urologist, educated on any worsening signs and symptoms to return to the emergency department      Final Impression      1. Dysuria    2. Acute cystitis with hematuria    3. Post-operative pain          DISPOSITION  Disposition: Discharge  Patient condition is: Stable    Comment: Please note this report has been produced using speech recognition software and may contain errors related to that system including errors in grammar, punctuation, and spelling, as well as words and phrases that may be inappropriate.  If there are any questions or concerns please " feel free to contact the dictating provider for clarification.    Kim Ellis, APRN-CNP       [1]   Past Medical History:  Diagnosis Date    (HFpEF) heart failure with preserved ejection fraction     Anemia     Aortic valve stenosis     S/P TAVR    Arthritis     Atrial fibrillation 10/10/2021    Benign prostatic hyperplasia with urinary retention     self caths 3-4 times daily    calcified aortic valve     Chronic UTI     CKD (chronic kidney disease)     Class 3 severe obesity with body mass index (BMI) of 40.0 to 44.9 in adult 08/19/2024    42.46 kg/m²    Coronary artery disease     ETOH abuse     Fatty liver     GERD (gastroesophageal reflux disease)     GI (gastrointestinal bleed)     History of hemorrhagic diathesis     History of spinal stenosis     History of transfusion 2017    GI bleed   no reaction    HL (hearing loss)     Hyperlipidemia     Hypertension     hypothyroidism     Idiopathic neuropathy     Osteomyelitis     Pancytopenia     Pulmonary HTN (Multi)     SBO (small bowel obstruction) (Multi)     Septicemia (Multi)     Sleep apnea     Sleep apnea with use of continuous positive airway pressure (CPAP)     Thrombocytopenia concurrent with and due to alcoholism (Multi)     Umbilical hernia     Urinary tract infection 2018    Uses wheelchair     Vitamin B12 deficiency     Vitamin D deficiency     Wears hearing aid in both ears    [2]   Past Surgical History:  Procedure Laterality Date    ABDOMINAL SURGERY      AORTIC VALVE REPLACEMENT      TAVR    CARDIAC SURGERY      COLONOSCOPY      EYE SURGERY      FRACTURE SURGERY      LEG SURGERY  08/08/2019    OTHER SURGICAL HISTORY  07/07/2021    Tonsillectomy with adenoidectomy    PICC LINE INSERTION WO IMAGING  08/08/2019    TONSILLECTOMY      UPPER GASTROINTESTINAL ENDOSCOPY      VASCULAR SURGERY      VASECTOMY  1984   [3]   Social History  Socioeconomic History    Marital status:    Tobacco Use    Smoking status: Never    Smokeless tobacco: Never    Vaping Use    Vaping status: Never Used   Substance and Sexual Activity    Alcohol use: Not Currently     Alcohol/week: 21.0 standard drinks of alcohol     Types: 21 Standard drinks or equivalent per week     Comment: hx ETOH abuse    Drug use: Yes     Types: Marijuana     Comment: Edibles    Sexual activity: Defer     Social Drivers of Health     Financial Resource Strain: Low Risk  (7/1/2024)    Overall Financial Resource Strain (CARDIA)     Difficulty of Paying Living Expenses: Not hard at all   Transportation Needs: No Transportation Needs (7/1/2024)    PRAPARE - Transportation     Lack of Transportation (Medical): No     Lack of Transportation (Non-Medical): No   Housing Stability: Low Risk  (7/1/2024)    Housing Stability Vital Sign     Unable to Pay for Housing in the Last Year: No     Number of Places Lived in the Last Year: 1     Unstable Housing in the Last Year: No   [4]   Allergies  Allergen Reactions    Levofloxacin Hives and Angioedema        RICARDA Nazario-CNP  05/31/25 1027

## 2025-05-31 NOTE — ED TRIAGE NOTES
Pt c/o lower abd pain and burning at his penis. Pt had a fierro removed and had a suprapubic cath placed yesterday.

## 2025-06-01 LAB — BACTERIA UR CULT: NORMAL

## 2025-06-02 ENCOUNTER — PATIENT MESSAGE (OUTPATIENT)
Dept: UROLOGY | Facility: HOSPITAL | Age: 75
End: 2025-06-02
Payer: MEDICARE

## 2025-06-02 DIAGNOSIS — R30.0 DYSURIA: Primary | ICD-10-CM

## 2025-06-02 RX ORDER — PHENAZOPYRIDINE HYDROCHLORIDE 200 MG/1
200 TABLET, FILM COATED ORAL 3 TIMES DAILY PRN
Qty: 6 TABLET | Refills: 1 | Status: SHIPPED | OUTPATIENT
Start: 2025-06-02 | End: 2025-06-05

## 2025-06-04 ENCOUNTER — PREP FOR PROCEDURE (OUTPATIENT)
Dept: RADIOLOGY | Facility: HOSPITAL | Age: 75
End: 2025-06-04
Payer: MEDICARE

## 2025-06-04 ENCOUNTER — TELEPHONE VISIT (OUTPATIENT)
Dept: RADIOLOGY | Facility: HOSPITAL | Age: 75
End: 2025-06-04
Payer: MEDICARE

## 2025-06-04 DIAGNOSIS — R10.2 SUPRAPUBIC PAIN, ACUTE: Primary | ICD-10-CM

## 2025-06-04 RX ORDER — OXYCODONE HYDROCHLORIDE 5 MG/1
5 TABLET ORAL EVERY 6 HOURS PRN
Qty: 12 TABLET | Refills: 0 | Status: SHIPPED | OUTPATIENT
Start: 2025-06-04 | End: 2025-06-07

## 2025-06-04 NOTE — TELECONSULT
Aly's wife, Sole called to discuss his ongoing suprapubic/bladder type pain. Hx of neuromuscular dysfunction of the bladder s/p suprapubic tube placement 5/30/25. Presented to the ED with increased suprapubic pain/discomfort and was started on pyridium, bactrim and percocet. Finished the percocet rx Monday 6/2 and called urology to discuss symptoms. They recommended adding scheduled advil and tylenol to the the medication and see if things improve. Still taking the bactrim and pyridium.    Sole reports pain is a 6/10, intermittent spasm type pain which is worse with movement and improves with resting. Does report a small amount of bloody drainage on the dressing 2 days ago and pink/orange tinged urine. Denies pulling on the tube, fever, chills, N/V, urine leakage around the tube, stool changes. Reports has had adequate volume within the urine bag and is emptying regularly.     Reports just can't seem to get the pain to stay away. Reported to continue current medication regimen and I will add Roxicodone for a few days as a one time rx. If additional medication is needed for the discomfort, would rec following up with PCP/urology for ongoing management.

## 2025-06-10 ENCOUNTER — APPOINTMENT (OUTPATIENT)
Dept: UROLOGY | Facility: HOSPITAL | Age: 75
End: 2025-06-10
Payer: MEDICARE

## 2025-06-19 ENCOUNTER — APPOINTMENT (OUTPATIENT)
Dept: HEMATOLOGY/ONCOLOGY | Facility: CLINIC | Age: 75
End: 2025-06-19
Payer: MEDICARE

## 2025-06-24 ENCOUNTER — PATIENT MESSAGE (OUTPATIENT)
Dept: UROLOGY | Facility: HOSPITAL | Age: 75
End: 2025-06-24
Payer: MEDICARE

## 2025-06-27 ENCOUNTER — OFFICE VISIT (OUTPATIENT)
Dept: UROLOGY | Facility: HOSPITAL | Age: 75
End: 2025-06-27
Payer: MEDICARE

## 2025-06-27 DIAGNOSIS — N40.1 BENIGN PROSTATIC HYPERPLASIA WITH URINARY RETENTION: Primary | ICD-10-CM

## 2025-06-27 DIAGNOSIS — Z93.59 SUPRAPUBIC CATHETER (MULTI): ICD-10-CM

## 2025-06-27 DIAGNOSIS — R33.8 BENIGN PROSTATIC HYPERPLASIA WITH URINARY RETENTION: Primary | ICD-10-CM

## 2025-06-27 PROCEDURE — 51702 INSERT TEMP BLADDER CATH: CPT | Performed by: NURSE PRACTITIONER

## 2025-06-27 PROCEDURE — 99213 OFFICE O/P EST LOW 20 MIN: CPT | Performed by: NURSE PRACTITIONER

## 2025-06-27 PROCEDURE — 1159F MED LIST DOCD IN RCRD: CPT | Performed by: NURSE PRACTITIONER

## 2025-06-27 PROCEDURE — 1160F RVW MEDS BY RX/DR IN RCRD: CPT | Performed by: NURSE PRACTITIONER

## 2025-06-27 NOTE — PROGRESS NOTES
[facility]  Outpatient Clinic Note    Patient Name:  Aly Mccarty  MRN:  [id number]  :  1950  Date of Service: 2025     Visit type: Follow up visit    HPI    Interval History:  Aly Mccarty is a 75 y.o. male with a past medical history of hypertension, aortic stenosis, A-fib on Xarelto, GERD, who is being seen today for  problems listed below.     Problem list/Chief complaints:  BPH with nocturia/urinary retention s/p SPT catheter placement 25 with Dr. Lemons      25: Visit with Dr. Lozada. 75 y.o. male who presents for a cystoscopic evaluation with lower urinary tract symptoms. The patient reports intermittency, straining, and urgency. He denies hematuria. The patient states symptoms are of moderate severity. Onset of symptoms was several months ago and was gradual in onset. His AUA Symptom Score is 17/35 manifested as irritative symptoms including urgency. He has no personal history of prostate cancer. He reports a history of no complicating symptoms. He denies no complicating symptoms.     Today, he reports increased x3 urinary frequency and urgency. These symptoms are worse at night. He is currently on Lasix 40 mg once daily.     Pt will need to self cath 5 times daily for more than 3 months CIC with a 16fr indefinitely for chronic retention.  Due to increased nocturia, pt is requesting to have a fierro placed every night by caretaker 16fr 10cc balloon, to be removed every morning      25: S/p SPT catheter placement with Dr. Lemons    25: Patient is accompanied by his wife. He has been unable to change the catheter bag as it's a percutaneous pigtail and not normal catheter. SPT has been draining with no issues. SPT catheter changed.    Medical History[1]    Surgical History[2]    Social History     Socioeconomic History    Marital status:      Spouse name: Not on file    Number of children: Not on file    Years of education: Not on file    Highest  education level: Not on file   Occupational History    Not on file   Tobacco Use    Smoking status: Never    Smokeless tobacco: Never   Vaping Use    Vaping status: Never Used   Substance and Sexual Activity    Alcohol use: Not Currently     Alcohol/week: 21.0 standard drinks of alcohol     Types: 21 Standard drinks or equivalent per week     Comment: hx ETOH abuse    Drug use: Yes     Types: Marijuana     Comment: Edibles    Sexual activity: Defer   Other Topics Concern    Not on file   Social History Narrative    Not on file     Social Drivers of Health     Financial Resource Strain: Low Risk  (7/1/2024)    Overall Financial Resource Strain (CARDIA)     Difficulty of Paying Living Expenses: Not hard at all   Food Insecurity: Not on file   Transportation Needs: No Transportation Needs (7/1/2024)    PRAPARE - Transportation     Lack of Transportation (Medical): No     Lack of Transportation (Non-Medical): No   Physical Activity: Not on file   Stress: Not on file   Social Connections: Not on file   Intimate Partner Violence: Not on file   Housing Stability: Low Risk  (7/1/2024)    Housing Stability Vital Sign     Unable to Pay for Housing in the Last Year: No     Number of Places Lived in the Last Year: 1     Unstable Housing in the Last Year: No       Allergies[3]     Current Medications[4]     Review of system:  All other systems have been reviewed and are negative for complaints      Last recorded vitals:  There were no vitals taken for this visit.    Physical Exam:  General: Appears comfortable and in no apparent distress.  Head: Normocephalic, atraumatic  Eyes: Non-injected conjunctiva, sclera clear, no proptosis  Lungs: Breathing is easy, non-labored. Speaking in clear and complete sentences. Normal diaphragmatic movement.  Cardiovascular: no peripheral edema, cyanosis or pallor.   Abdomen: soft, non-distended, non-tender; SPT with clear, yellow urine draining into catheter bag  : Bladder: non tender, not  distended  MSK: Ambulatory with steady gait, unassisted  Skin: No visible rashes or lesions  Neurologic: Alert, oriented to person, place, and time  Psychiatric: mood and affect appropriate      Imaging  IR  suprapubic placement, US guided percutaneous placement  Narrative: Interpreted By:  Yassine Lemons,   STUDY:  US GUIDED PERCUTANEOUS PLACEMENT; IR  SUPRAPUBIC PLACEMENT; ;  5/30/2025 2:37 pm; 5/30/2025 2:31 pm      ULTRASOUND FLUOROSCOPICALLY GUIDED SUPRAPUBIC CATHETER      INDICATION:  Signs/Symptoms:Suprapubic Catheter Placement with US Guidance in IR  Lab; Signs/Symptoms:supra pubic insertion. 75-year-old man with  neuromuscular dysfunction of the bladder, urinary retention.      ,N31.9 Neuromuscular dysfunction of bladder, unspecified      COMPARISON:  CT abdomen pelvis 04/10/2025      ACCESSION NUMBER(S):  [id number]; [id number]      ORDERING CLINICIAN:  GILMA GALEAS      TECHNIQUE:      ATTENDING : Yassine Lemons M.D.      TECHNICAL DESCRIPTION/FINDINGS: The procedure, including all risks,  benefits and alternatives were explained to the patient in detail.  All questions were answered and written informed consent was obtained.      The patient was positioned supine on the fluoroscopy table. A  time-out was performed.      The anterior pelvis was prepped and draped in usual sterile fashion.  Sterile saline was instilled through the patient's indwelling trans  urethral Mendoza catheter and the bladder was visualized under  sonographic evaluation. Ultrasound images were saved. 1% lidocaine  was administered subcutaneously for local anesthesia. Under real-time  ultrasound guidance, the anterior bladder was accessed with an 18  gauge Chiba needle. Position was confirmed with sonographic  evaluation. Through the access needle, an 035 Amplatz wire was then  coiled and after serial dilation, an 18 Welsh pigtail drainage  catheter functioning as a suprapubic tube was placed.  Completion  contrast injection confirms appropriate positioning.      The newly placed suprapubic catheter was connected to gravity bag  drainage and sutured to the skin with 2 0 Prolene stay suture. A  sterile dressing was applied.      The patient's indwelling trans urethral Mendoza catheter balloon was  then deflated and removed.      SEDATION/MEDICATIONS: Continuous cardiopulmonary monitoring was  performed by a radiology nurse for the duration of the procedure.  1  mg Versed and   50 mcg Fentanyl were administered for moderate  conscious sedation time of 30 minutes.      15 cc 1% Lidocaine was  administered subcutaneously for local anesthesia. SPECIMENS: None.  ESTIMATED BLOOD LOSS:  5 cc  FLOUROSCOPY:  0.4 minutes; DAP  80400 mGy-cm*2; Air Kerma  38.31 mGy  CONTRAST: 10 cc Omnipaque 350      FINDINGS:  Test      Impression: Ultrasound fluoroscopically guided 18 Chadian pigtail suprapubic  catheter placement, to gravity drainage.      Plan:  Anticipate subsequent routine catheter exchanges in the urology  clinic.      MACRO:  None      Signed by: Yassine Lemons 6/1/2025 10:46 AM  Dictation workstation:   [id number]      PROCEDURE: Cystoscopy with Dr. Lozada 4/8/25  The patient  was brought into the procedure suite and informed consent was reviewed and confirmed. Vital signs were obtained prior to the procedure: /62   Pulse (!) 48 .  The patient was escorted onto the stretcher, placed supine, prepped with betadine and draped in the usual standard surgical fashion.  Intraurethral 2% viscous lidocaine jelly was used for local analgesia.  A 16 Chadian flexible cystourethroscope was inserted into the urethra.   The penile urethra was normal.  The prostate urethra was enlarged.  Upon entering the bladder the entire bladder was surveyed in a 360 degree fashion.  Extremely poor visibility. Grossly, There was no evidence of any bladder lesions, foreign objects, stones or evidence of any mucosal changes. Pt will  need to repeat his cysto.   The patient tolerated the procedure well.  Vitals were stable after the procedure.  The patient was able to void and was discharged home.  Verbal and written Post procedure instructions were reviewed with the patient.     IMPRESSION:  Poor visibility  Current UTI        PLAN:  Ucx  CIC 5 times daily       Labs  Admission on 05/31/2025, Discharged on 05/31/2025   Component Date Value    Color, Urine 05/31/2025 Light-[place] (N)     Appearance, Urine 05/31/2025 Turbid (N)     Specific Gravity, Urine 05/31/2025 1.017     pH, Urine 05/31/2025 7.0     Protein, Urine 05/31/2025 200 (2+) (A)     Glucose, Urine 05/31/2025 Normal     Blood, Urine 05/31/2025 OVER (3+) (A)     Ketones, Urine 05/31/2025 NEGATIVE     Bilirubin, Urine 05/31/2025 NEGATIVE     Urobilinogen, Urine 05/31/2025 Normal     Nitrite, Urine 05/31/2025 NEGATIVE     Leukocyte Esterase, Urine 05/31/2025 500 [place]/uL (A)     Extra Tube 05/31/2025 Hold for add-ons.     WBC, Urine 05/31/2025 >50 (A)     WBC Clumps, Urine 05/31/2025 FEW     RBC, Urine 05/31/2025 >20 (A)     Mucus, Urine 05/31/2025 FEW     Urine Culture 05/31/2025 Clinically insignificant growth based on current clinical standards.        Assessment and Plan:  Aly Mccarty is a 75 y.o. male with history of urinary retention s/p SPT catheter placement on 5/30/25 with Dr. Lemons, who presents for follow up and cath change.     Bladder Catheterization  Current indwelling suprapubic tube, was removed without difficulty.  Prep: patient was prepped and draped in usual sterile fashion    Prep type: betadine; urojet       Procedure Details    Procedure: catheter change      Catheter insertion: SPT    Catheter size: 16 fr straight (silicone)    Catheter provided by: health care organization      Number of initial attempts: 1    Urine characteristics: blood tinged, clear    Balloon inflation amount (mL) comment: 10 ml    Leg bag attached: yes      Post-Procedure Details      Outcome: patient tolerated procedure well with no complications      Post-procedure interventions: post-procedure education provided      Disposition: discharged home in satisfactory condition         Plan:  -SPT catheter changed with no issues, irrigated with blood tinged, clear urine  -Patient encouraged to drink plenty of fluids for hydration and urine output  -Follow-up as scheduled on 7/24/25d with Dr. Lozada, or sooner if needed, to reassess symptoms and for medication refill.    All questions and concerns were addressed. Patient verbalizes understanding and has no other questions at this time.     Some elements copied from Dr. Lozada's note on 4/8/25, the elements have been updated and all reflect current decision making from today, 6/27/25    E&M visit today is associated with current or anticipated ongoing medical care services related to a patient's single, serious condition or a complex condition.    RICARDA Gonzalez-CNP   [facility]  6/27/25         [1]   Past Medical History:  Diagnosis Date    (HFpEF) heart failure with preserved ejection fraction     Anemia     Aortic valve stenosis     S/P TAVR    Arthritis     Atrial fibrillation 10/10/2021    Benign prostatic hyperplasia with urinary retention     self caths 3-4 times daily    calcified aortic valve     Chronic UTI     CKD (chronic kidney disease)     Class 3 severe obesity with body mass index (BMI) of 40.0 to 44.9 in adult 08/19/2024    42.46 kg/m²    Coronary artery disease     ETOH abuse     Fatty liver     GERD (gastroesophageal reflux disease)     GI (gastrointestinal bleed)     History of hemorrhagic diathesis     History of spinal stenosis     History of transfusion 2017    GI bleed   no reaction    HL (hearing loss)     Hyperlipidemia     Hypertension     hypothyroidism     Idiopathic neuropathy     Osteomyelitis     Pancytopenia     Pulmonary HTN (Multi)     SBO (small bowel obstruction) (Multi)     Septicemia (Multi)      Sleep apnea     Sleep apnea with use of continuous positive airway pressure (CPAP)     Thrombocytopenia concurrent with and due to alcoholism (Multi)     Umbilical hernia     Urinary tract infection 2018    Uses wheelchair     Vitamin B12 deficiency     Vitamin D deficiency     Wears hearing aid in both ears    [2]   Past Surgical History:  Procedure Laterality Date    ABDOMINAL SURGERY      AORTIC VALVE REPLACEMENT      TAVR    CARDIAC SURGERY      COLONOSCOPY      EYE SURGERY      FRACTURE SURGERY      LEG SURGERY  08/08/2019    OTHER SURGICAL HISTORY  07/07/2021    Tonsillectomy with adenoidectomy    PICC LINE INSERTION WO IMAGING  08/08/2019    TONSILLECTOMY      UPPER GASTROINTESTINAL ENDOSCOPY      VASCULAR SURGERY      VASECTOMY  1984   [3]   Allergies  Allergen Reactions    Levofloxacin Hives and Angioedema   [4]   Current Outpatient Medications:     acetaminophen (Tylenol) 500 mg tablet, Take 1-2 by mouth every 6 hours as needed for pain, Disp: , Rfl:     carvedilol (Coreg) 25 mg tablet, Take 0.5 tablets (12.5 mg) by mouth 2 times daily (morning and late afternoon). Take with food., Disp: , Rfl:     cetirizine (ZyrTEC) 10 mg tablet, Take 1 tablet (10 mg) by mouth once daily in the evening., Disp: , Rfl:     cyanocobalamin (Vitamin B-12) 1,000 mcg tablet, Take 1 tablet (1,000 mcg) by mouth once daily., Disp: , Rfl:     febuxostat (Uloric) 40 mg tablet, Take 1 tablet (40 mg) by mouth once daily., Disp: 30 tablet, Rfl: 11    furosemide (Lasix) 40 mg tablet, Take 1 tablet (40 mg) by mouth once daily., Disp: 90 tablet, Rfl: 3    hydrALAZINE (Apresoline) 50 mg tablet, Take 1 tablet (50 mg) by mouth 2 times a day., Disp: 180 tablet, Rfl: 3    ketoconazole (NIZOral) 2 % cream, APPLY TO AFFECTED AREA EVERY MORNING TILL CLEAR, REPEAT AS NEEDED. (Patient taking differently: Apply 1 Application topically if needed for rash.), Disp: , Rfl:     levothyroxine (Synthroid, Levoxyl) 88 mcg tablet, TAKE 1 TABLET BY MOUTH  EVERY DAY ON EMPTY STOMACH, Disp: 90 tablet, Rfl: 1    Ghazal-Chi Rx 1- mg-mg-mcg tablet, TAKE 1 TABLET BY MOUTH EVERY DAY WITH FOOD, Disp: 90 tablet, Rfl: 3    sodium bicarbonate 650 mg tablet, Take 1 tablet (650 mg) by mouth 2 times a day., Disp: 60 tablet, Rfl: 11    Xarelto 20 mg tablet, Take 1 tablet (20 mg) by mouth once daily in the evening. Take with meals., Disp: 90 tablet, Rfl: 3

## 2025-07-11 ENCOUNTER — APPOINTMENT (OUTPATIENT)
Dept: PRIMARY CARE | Facility: CLINIC | Age: 75
End: 2025-07-11
Payer: MEDICARE

## 2025-07-11 ENCOUNTER — HOSPITAL ENCOUNTER (OUTPATIENT)
Dept: VASCULAR MEDICINE | Facility: HOSPITAL | Age: 75
Discharge: HOME | End: 2025-07-11
Payer: MEDICARE

## 2025-07-11 VITALS
HEART RATE: 50 BPM | HEIGHT: 74 IN | WEIGHT: 315 LBS | SYSTOLIC BLOOD PRESSURE: 130 MMHG | OXYGEN SATURATION: 94 % | DIASTOLIC BLOOD PRESSURE: 80 MMHG | BODY MASS INDEX: 40.43 KG/M2

## 2025-07-11 DIAGNOSIS — M79.89 RIGHT LEG SWELLING: ICD-10-CM

## 2025-07-11 DIAGNOSIS — E11.9 TYPE 2 DIABETES MELLITUS WITHOUT COMPLICATION, WITHOUT LONG-TERM CURRENT USE OF INSULIN: ICD-10-CM

## 2025-07-11 DIAGNOSIS — I48.91 ATRIAL FIBRILLATION, UNSPECIFIED TYPE (MULTI): ICD-10-CM

## 2025-07-11 DIAGNOSIS — E66.01 MORBID (SEVERE) OBESITY DUE TO EXCESS CALORIES (MULTI): ICD-10-CM

## 2025-07-11 DIAGNOSIS — I85.00 ESOPHAGEAL VARICES WITHOUT BLEEDING, UNSPECIFIED ESOPHAGEAL VARICES TYPE (MULTI): ICD-10-CM

## 2025-07-11 DIAGNOSIS — E78.5 HYPERLIPIDEMIA, UNSPECIFIED HYPERLIPIDEMIA TYPE: ICD-10-CM

## 2025-07-11 DIAGNOSIS — I10 PRIMARY HYPERTENSION: ICD-10-CM

## 2025-07-11 DIAGNOSIS — J44.1 ASTHMA WITH COPD WITH EXACERBATION: ICD-10-CM

## 2025-07-11 DIAGNOSIS — Z00.00 ROUTINE GENERAL MEDICAL EXAMINATION AT HEALTH CARE FACILITY: Primary | ICD-10-CM

## 2025-07-11 DIAGNOSIS — E03.9 HYPOTHYROIDISM, UNSPECIFIED TYPE: ICD-10-CM

## 2025-07-11 LAB — BODY SURFACE AREA: 2.84 M2

## 2025-07-11 PROCEDURE — G0439 PPPS, SUBSEQ VISIT: HCPCS | Performed by: INTERNAL MEDICINE

## 2025-07-11 PROCEDURE — 1036F TOBACCO NON-USER: CPT | Performed by: INTERNAL MEDICINE

## 2025-07-11 PROCEDURE — 93971 EXTREMITY STUDY: CPT

## 2025-07-11 PROCEDURE — 1159F MED LIST DOCD IN RCRD: CPT | Performed by: INTERNAL MEDICINE

## 2025-07-11 PROCEDURE — 3079F DIAST BP 80-89 MM HG: CPT | Performed by: INTERNAL MEDICINE

## 2025-07-11 PROCEDURE — 99214 OFFICE O/P EST MOD 30 MIN: CPT | Performed by: INTERNAL MEDICINE

## 2025-07-11 PROCEDURE — 3075F SYST BP GE 130 - 139MM HG: CPT | Performed by: INTERNAL MEDICINE

## 2025-07-11 PROCEDURE — 3008F BODY MASS INDEX DOCD: CPT | Performed by: INTERNAL MEDICINE

## 2025-07-11 PROCEDURE — 1170F FXNL STATUS ASSESSED: CPT | Performed by: INTERNAL MEDICINE

## 2025-07-11 RX ORDER — ATORVASTATIN CALCIUM 10 MG/1
10 TABLET, FILM COATED ORAL DAILY
Qty: 100 TABLET | Refills: 3 | Status: SHIPPED | OUTPATIENT
Start: 2025-07-11 | End: 2026-08-15

## 2025-07-11 RX ORDER — DOXYCYCLINE 100 MG/1
100 CAPSULE ORAL 2 TIMES DAILY
Qty: 14 CAPSULE | Refills: 0 | Status: SHIPPED | OUTPATIENT
Start: 2025-07-11 | End: 2025-07-18

## 2025-07-11 ASSESSMENT — ENCOUNTER SYMPTOMS
CONSTIPATION: 1
HEADACHES: 0
BACK PAIN: 1
DYSURIA: 0
ARTHRALGIAS: 1
LOSS OF SENSATION IN FEET: 0
SHORTNESS OF BREATH: 0
ABDOMINAL PAIN: 0
OCCASIONAL FEELINGS OF UNSTEADINESS: 1
WEAKNESS: 1
DEPRESSION: 0
ABDOMINAL DISTENTION: 1

## 2025-07-11 ASSESSMENT — ACTIVITIES OF DAILY LIVING (ADL)
BATHING: NEEDS ASSISTANCE
GROCERY_SHOPPING: NEEDS ASSISTANCE
DOING_HOUSEWORK: NEEDS ASSISTANCE
MANAGING_FINANCES: INDEPENDENT
DRESSING: NEEDS ASSISTANCE
TAKING_MEDICATION: INDEPENDENT

## 2025-07-11 NOTE — PROGRESS NOTES
"Subjective   Patient ID: Aly Mccarty is a 75 y.o. male who presents for Annual Exam and Medicare Annual Wellness Visit Subsequent.    HPI     Pt is a 79 yo male here with wife for his annual Medicare wellness visit.     Of note, patient recently seen in ED for uncomplicated E.coli UTI and cystitis. Symptoms resolved on Macrobid. For the last few wks, he has followed with urology as he had a catheter placed in May secondary to pt's hx of BPH, subsequently switched from Mendoza to suprapubic catheter which his wife states is changed frequently. Lower abdominal pain persisted on and off during this time    Per pt's wife, 1-2 days ago she noted worsening leg swelling, worse on right vs left. Pt states its not painful. Pt denies any recent trips    Sustained a fall last night, denies hitting head or losing consciousness. Endorses L sided discomfort, attributes it to fall as opposed to chest pain.     No headaches, CP, SOB, abdominal pain, dysuria, or stool changes on ROS.     Review of Systems   Eyes:  Negative for visual disturbance.   Respiratory:  Negative for shortness of breath.    Cardiovascular:  Positive for leg swelling. Negative for chest pain.   Gastrointestinal:  Positive for abdominal distention and constipation. Negative for abdominal pain.   Genitourinary:  Negative for dysuria.   Musculoskeletal:  Positive for arthralgias and back pain.   Neurological:  Positive for weakness. Negative for headaches.       Objective   /62   Pulse 50   Ht 1.88 m (6' 2\")   Wt (!) 155 kg (341 lb)   SpO2 94%   BMI 43.78 kg/m²     Physical Exam  Constitutional:       Appearance: He is obese.   Pulmonary:      Effort: Pulmonary effort is normal.      Breath sounds: Normal breath sounds.   Neurological:      General: No focal deficit present.      Mental Status: He is alert.         Assessment/Plan     #R Leg Swelling, Suspicion for DVT  #Cellulitis  -Doppler US ordered to assess R leg for DVT  -Referral for " vascular medicine placed to assess swelling  -Started on 100 mg doxycycline BID for 1 week  -Increase furosemide to 80mg daily for 5 days then back to 40mg daily for possible volume overload component     #BPH with nocturia/urinary retention s/p SPT catheter placement 5/31/25 with Dr. Lemons   -Follows with urology  -Recently seen in ED for cystitis and uncomplicated UTI secondary to E.coli, resolved on Macrobid  -Switched from Mendoza to suprapubic catheter, no complications reported at visit       -Per pt's wife, catheter to be changed every 4 wks, already been changed once  -Continue to follow with urology recommendations regarding catheter     #HFpEF  #SOB  #AMANDA  -Following with cardiology  -EF of 55-60% on TTE 8/21/2024  -Continuing on Lasix 40 mg daily  -Pt reports CPAP use at home          #HTN, Chronic Afib, AVS s/p TAVR  -BP typically below 130/80 per pt. 130/80 in office   -Cont hydralazine 50mg TID, carvedilol 25mg BID   -Per pt's wife, they expressed concern with insurance coverage for Xarelto. Discussed switch to 5 mg Eliquis BID        -Pt will continue on 20 mg Xarelto daily and if Eliquis covered, will switch and proceed to stop Xarelto    #HLD  -72% ASCVD Risk  -Due to increased cardiovascular risk per high ASCVD score, patient started on 10 mg atorvastatin daily  -Lipid panel ordered     #Pancytopenia   -Following with heme. Getting MEENA injections.     #Hypothyroidism  -Cont levothyroxine 88 mcg tablet daily  -Check TSH        Influenza: 10/1/24  COVID: 10/16/24  RSV: 12/1/23  PREVNAR 20: 11/16/22  PSA:7/12/2023, no longer indicated   Colon Cancer: scope last 4-5 yrs ago- consider next year         Pan Chinchilla MD  PGY-1

## 2025-07-11 NOTE — PATIENT INSTRUCTIONS
You were seen for your annual medicare wellness visit.    You were prescribed a drug called doxycycline. Please take twice a day for the next week.    Labs to check your cholesterol was ordered.    You were prescribed a statin today.     A referral was placed to vascular medicine for your right leg swelling.    An ultrasound of your right leg was performed today.    Eliquis is a drug for atrial fibrillation that was prescribed for you today in the event there are insurance coverage issues with your Xarelto. Continue taking your Xarelto.    If Eliquis is covered , then stop taking your Xarelto.    Return to clinic in 1 year

## 2025-07-23 NOTE — PROGRESS NOTES
Subjective   Patient ID: Aly Mccarty is a 75 y.o. male    HPI  75 y.o. male who presents for a 8 weeks follow-up visit with lower urinary tract symptoms. The patient reports intermittency, straining, and urgency. He denies hematuria. The patient states symptoms are of moderate severity. Onset of symptoms was several months ago and was gradual in onset. His AUA Symptom Score is 17/35 manifested as irritative symptoms including urgency. He has no personal history of prostate cancer. He reports a history of no complicating symptoms. He denies no complicating symptoms.    He is currently on Lasix 40 mg once daily.     Patient presented to the ED on 05/31/2025 with complaints of penile burning, urgency x1 day. Stated that this has been a chronic problem which is why he has had to have chronic Mendoza catheters. Wife stated that this is how he feels when he has cystitis or when his bladder feels full. The day prior the patient had a suprapubic catheter placed to replace having a chronic indwelling Mendoza catheter. Patient started having discomfort at the surgical site and burning sensation in the penis. Attempted to straight cath at home with no relief of symptoms, has been on Pyridium in the past but has not taken any medications for discomfort currently. Not currently on antibiotics. Had urine in the bag. Denied any fevers, chills, flank pain, chest pain, shortness of breath. Bladder scan showed 0 cc of urine in bladder, suprapubic catheter did not appear to be obstructive. UA showed 500 leukocytes.    Today, he reports some pain. Notes some dark blood mostly in the evenings.     The most recent Urine Culture, conducted on 05/31/2025, revealed:  Clinically insignificant growth based on current clinical standards.    The most recent CT abdomen pelvis w IV contrast, conducted on 04/10/2025, revealed:  No evidence of bowel obstruction or fecal retention.      Bilateral nephrolithiasis. No hydronephrosis. Ill-defined  bladder  wall thickening with Mendoza catheter in place and questionable right  renal pelvic wall thickening. UTI/cystitis not excluded. Correlation  with urinalysis suggested.      Tiny stones or sludge in the gallbladder.      Numerous additional findings as above, in general similar to  07/01/2024.      Review of Systems    All systems were reviewed. Anything negative was noted in the HPI.    Objective   Physical Exam    General: Well developed, well nourished, alert and cooperative, appears in no acute distress   Eyes: Non-injected conjunctiva, sclera clear, no proptosis   Cardiac: Extremities are warm and well perfused. No edema, cyanosis or pallor   Lungs: Breathing is easy, non-labored. Speaking in clear and complete sentences. Normal diaphragmatic movement   MSK: Ambulatory with steady gait, unassisted   Neuro: Alert and oriented to person, place, and time   Psych: Demonstrates good judgment and reason, without hallucinations, abnormal affect or abnormal behaviors   Skin: No obvious lesions, no rashes       No CVA tenderness bilaterally   No suprapubic pain or discomfort       Past Medical History:   Diagnosis Date    (HFpEF) heart failure with preserved ejection fraction     Anemia     Aortic valve stenosis     S/P TAVR    Arthritis     Atrial fibrillation 10/10/2021    Benign prostatic hyperplasia with urinary retention     self caths 3-4 times daily    calcified aortic valve     Chronic UTI     CKD (chronic kidney disease)     Class 3 severe obesity with body mass index (BMI) of 40.0 to 44.9 in adult 08/19/2024    42.46 kg/m²    Coronary artery disease     ETOH abuse     Fatty liver     GERD (gastroesophageal reflux disease)     GI (gastrointestinal bleed)     History of hemorrhagic diathesis     History of spinal stenosis     History of transfusion 2017    GI bleed   no reaction    HL (hearing loss)     Hyperlipidemia     Hypertension     hypothyroidism     Idiopathic neuropathy     Osteomyelitis      Pancytopenia     Pulmonary HTN (Multi)     SBO (small bowel obstruction) (Multi)     Septicemia (Multi)     Sleep apnea     Sleep apnea with use of continuous positive airway pressure (CPAP)     Thrombocytopenia concurrent with and due to alcoholism (Multi)     Umbilical hernia     Urinary tract infection 2018    Uses wheelchair     Vitamin B12 deficiency     Vitamin D deficiency     Wears hearing aid in both ears        Past Surgical History:   Procedure Laterality Date    ABDOMINAL SURGERY      AORTIC VALVE REPLACEMENT      TAVR    CARDIAC SURGERY      COLONOSCOPY      EYE SURGERY      FRACTURE SURGERY      LEG SURGERY  08/08/2019    OTHER SURGICAL HISTORY  07/07/2021    Tonsillectomy with adenoidectomy    PICC LINE INSERTION WO IMAGING  08/08/2019    TONSILLECTOMY      UPPER GASTROINTESTINAL ENDOSCOPY      VASCULAR SURGERY      VASECTOMY  1984       Assessment/Plan   Hx of bacteriemia, septicemia and urosepsis sp prolonged ICU stay, urinary retention    75 y.o. male who presents for the above condition, Today, we had a very long and extensive discussion with the patient regarding the pathophysiology, differential diagnosis, risk factor, associated condition, diagnostic work-up and management of BPH and lower urinary tract symptoms and acute urinary retention. I discussed with the patient the need to check his PSA to assess his prostate cancer risk. We discussed at length the mechanism of action, risk, benefit, adverse events and side effect of alpha-blocker in the form of tamsulosin 0.4 mg p.o nightly. We discussed in particular the risk of hypotension, lightheadedness, dizziness, and the risk of fall and bone fracture. Also discussed retrograde ejaculation of the side effects of the medication. We had another discussion with the patient regarding lifestyle modifications including low fluid intake after 5 PM, timed voiding every 2 hours, and decrease caffeine intake. I discussed with the patient that in case he  had an elevated PSA, we will proceed do an MRI of the prostate.       - Avoid caffeine entirely     - Implement a timed voiding schedule (every 2 to 2.5 hours).    - Discussed the importance of controlling fluid intake and avoiding caffeine.     Plan:  - PSA today  - Urine culture  - Follow-up in 1 year  - Follow up in 1 month with NP Lexus Linares for Mendoza change      E&M visit today is associated with current or anticipated ongoing medical care services related to a patient's single, serious condition or a complex condition.     7/24/2025    Scribe Attestation  By signing my name below, STAS, Luna Aranda, Scribe attest that this documentation has been prepared under the direction and in the presence of Dr. Alfredito Lozada.

## 2025-07-24 ENCOUNTER — OFFICE VISIT (OUTPATIENT)
Dept: UROLOGY | Facility: HOSPITAL | Age: 75
End: 2025-07-24
Payer: MEDICARE

## 2025-07-24 DIAGNOSIS — R39.9 LOWER URINARY TRACT SYMPTOMS (LUTS): ICD-10-CM

## 2025-07-24 DIAGNOSIS — Z12.5 SCREENING PSA (PROSTATE SPECIFIC ANTIGEN): Primary | ICD-10-CM

## 2025-07-24 PROCEDURE — 51705 CHANGE OF BLADDER TUBE: CPT | Performed by: STUDENT IN AN ORGANIZED HEALTH CARE EDUCATION/TRAINING PROGRAM

## 2025-07-24 PROCEDURE — G2211 COMPLEX E/M VISIT ADD ON: HCPCS | Performed by: STUDENT IN AN ORGANIZED HEALTH CARE EDUCATION/TRAINING PROGRAM

## 2025-07-24 PROCEDURE — 99214 OFFICE O/P EST MOD 30 MIN: CPT | Performed by: STUDENT IN AN ORGANIZED HEALTH CARE EDUCATION/TRAINING PROGRAM

## 2025-07-24 PROCEDURE — 1159F MED LIST DOCD IN RCRD: CPT | Performed by: STUDENT IN AN ORGANIZED HEALTH CARE EDUCATION/TRAINING PROGRAM

## 2025-07-24 PROCEDURE — 99214 OFFICE O/P EST MOD 30 MIN: CPT | Mod: 25 | Performed by: STUDENT IN AN ORGANIZED HEALTH CARE EDUCATION/TRAINING PROGRAM

## 2025-07-25 LAB — PSA SERPL-MCNC: 0.84 NG/ML

## 2025-07-26 DIAGNOSIS — E03.8 OTHER SPECIFIED HYPOTHYROIDISM: ICD-10-CM

## 2025-07-28 RX ORDER — LEVOTHYROXINE SODIUM 88 UG/1
88 TABLET ORAL DAILY
Qty: 90 TABLET | Refills: 1 | Status: SHIPPED | OUTPATIENT
Start: 2025-07-28

## 2025-08-22 ENCOUNTER — LAB (OUTPATIENT)
Dept: LAB | Facility: HOSPITAL | Age: 75
End: 2025-08-22
Payer: MEDICARE

## 2025-08-22 ENCOUNTER — OFFICE VISIT (OUTPATIENT)
Dept: UROLOGY | Facility: HOSPITAL | Age: 75
End: 2025-08-22
Payer: MEDICARE

## 2025-08-22 DIAGNOSIS — N18.4 CHRONIC KIDNEY DISEASE, STAGE 4 (SEVERE) (MULTI): Primary | ICD-10-CM

## 2025-08-22 DIAGNOSIS — D63.1 ANEMIA DUE TO STAGE 4 CHRONIC KIDNEY DISEASE: ICD-10-CM

## 2025-08-22 DIAGNOSIS — Z93.59 PRESENCE OF SUPRAPUBIC CATHETER (MULTI): Primary | ICD-10-CM

## 2025-08-22 DIAGNOSIS — N18.4 ANEMIA DUE TO STAGE 4 CHRONIC KIDNEY DISEASE: ICD-10-CM

## 2025-08-22 LAB
BASOPHILS # BLD AUTO: 0.04 X10*3/UL (ref 0–0.1)
BASOPHILS NFR BLD AUTO: 0.8 %
EOSINOPHIL # BLD AUTO: 0.21 X10*3/UL (ref 0–0.4)
EOSINOPHIL NFR BLD AUTO: 4.1 %
ERYTHROCYTE [DISTWIDTH] IN BLOOD BY AUTOMATED COUNT: 13.3 % (ref 11.5–14.5)
FERRITIN SERPL-MCNC: 47 NG/ML (ref 20–300)
HCT VFR BLD AUTO: 37.4 % (ref 41–52)
HGB BLD-MCNC: 11.9 G/DL (ref 13.5–17.5)
IMM GRANULOCYTES # BLD AUTO: 0.02 X10*3/UL (ref 0–0.5)
IMM GRANULOCYTES NFR BLD AUTO: 0.4 % (ref 0–0.9)
IRON SATN MFR SERPL: 20 % (ref 25–45)
IRON SERPL-MCNC: 70 UG/DL (ref 35–150)
LYMPHOCYTES # BLD AUTO: 0.99 X10*3/UL (ref 0.8–3)
LYMPHOCYTES NFR BLD AUTO: 19.3 %
MCH RBC QN AUTO: 33.3 PG (ref 26–34)
MCHC RBC AUTO-ENTMCNC: 31.8 G/DL (ref 32–36)
MCV RBC AUTO: 105 FL (ref 80–100)
MONOCYTES # BLD AUTO: 0.36 X10*3/UL (ref 0.05–0.8)
MONOCYTES NFR BLD AUTO: 7 %
NEUTROPHILS # BLD AUTO: 3.51 X10*3/UL (ref 1.6–5.5)
NEUTROPHILS NFR BLD AUTO: 68.4 %
NRBC BLD-RTO: 0 /100 WBCS (ref 0–0)
PLATELET # BLD AUTO: 167 X10*3/UL (ref 150–450)
RBC # BLD AUTO: 3.57 X10*6/UL (ref 4.5–5.9)
TIBC SERPL-MCNC: 343 UG/DL (ref 240–445)
UIBC SERPL-MCNC: 273 UG/DL (ref 110–370)
WBC # BLD AUTO: 5.1 X10*3/UL (ref 4.4–11.3)

## 2025-08-22 PROCEDURE — 51702 INSERT TEMP BLADDER CATH: CPT | Performed by: NURSE PRACTITIONER

## 2025-08-22 PROCEDURE — 1036F TOBACCO NON-USER: CPT | Performed by: NURSE PRACTITIONER

## 2025-08-22 PROCEDURE — 1159F MED LIST DOCD IN RCRD: CPT | Performed by: NURSE PRACTITIONER

## 2025-08-22 PROCEDURE — 83540 ASSAY OF IRON: CPT

## 2025-08-22 PROCEDURE — 82728 ASSAY OF FERRITIN: CPT

## 2025-08-22 PROCEDURE — 99213 OFFICE O/P EST LOW 20 MIN: CPT | Performed by: NURSE PRACTITIONER

## 2025-08-22 PROCEDURE — 85025 COMPLETE CBC W/AUTO DIFF WBC: CPT

## 2025-08-22 PROCEDURE — 83550 IRON BINDING TEST: CPT

## 2025-08-22 PROCEDURE — 1126F AMNT PAIN NOTED NONE PRSNT: CPT | Performed by: NURSE PRACTITIONER

## 2025-08-22 PROCEDURE — 1160F RVW MEDS BY RX/DR IN RCRD: CPT | Performed by: NURSE PRACTITIONER

## 2025-08-22 ASSESSMENT — PAIN SCALES - GENERAL: PAINLEVEL_OUTOF10: 0-NO PAIN

## 2025-08-25 ENCOUNTER — TELEPHONE (OUTPATIENT)
Dept: HEMATOLOGY/ONCOLOGY | Facility: CLINIC | Age: 75
End: 2025-08-25
Payer: MEDICARE

## 2025-08-25 ENCOUNTER — APPOINTMENT (OUTPATIENT)
Dept: HEMATOLOGY/ONCOLOGY | Facility: CLINIC | Age: 75
End: 2025-08-25
Payer: MEDICARE

## 2025-08-28 DIAGNOSIS — I50.32 CHRONIC DIASTOLIC CONGESTIVE HEART FAILURE: ICD-10-CM

## 2025-08-28 RX ORDER — FUROSEMIDE 40 MG/1
40 TABLET ORAL DAILY
Qty: 90 TABLET | Refills: 3 | Status: SHIPPED | OUTPATIENT
Start: 2025-08-28 | End: 2026-08-28

## 2025-09-02 DIAGNOSIS — I10 ESSENTIAL (PRIMARY) HYPERTENSION: ICD-10-CM

## 2025-09-02 LAB
APPEARANCE UR: CLEAR
BACTERIA #/AREA URNS HPF: ABNORMAL /HPF
BILIRUB UR QL STRIP: NEGATIVE
COLOR UR: YELLOW
GLUCOSE UR QL STRIP: NEGATIVE
HGB UR QL STRIP: ABNORMAL
HYALINE CASTS #/AREA URNS LPF: ABNORMAL /LPF
KETONES UR QL STRIP: NEGATIVE
LEUKOCYTE ESTERASE UR QL STRIP: ABNORMAL
NITRITE UR QL STRIP: POSITIVE
PH UR STRIP: 7 [PH] (ref 5–8)
PROT UR QL STRIP: NEGATIVE
RBC #/AREA URNS HPF: ABNORMAL /HPF
SERVICE CMNT-IMP: ABNORMAL
SP GR UR STRIP: 1.01 (ref 1–1.03)
SQUAMOUS #/AREA URNS HPF: ABNORMAL /HPF
WBC #/AREA URNS HPF: ABNORMAL /HPF

## 2025-09-02 RX ORDER — HYDRALAZINE HYDROCHLORIDE 50 MG/1
50 TABLET, FILM COATED ORAL 2 TIMES DAILY
Qty: 180 TABLET | Refills: 1 | Status: SHIPPED | OUTPATIENT
Start: 2025-09-02 | End: 2026-03-01

## 2025-12-02 ENCOUNTER — APPOINTMENT (OUTPATIENT)
Dept: NEPHROLOGY | Facility: CLINIC | Age: 75
End: 2025-12-02
Payer: MEDICARE